# Patient Record
Sex: FEMALE | Race: WHITE | NOT HISPANIC OR LATINO | Employment: FULL TIME | URBAN - METROPOLITAN AREA
[De-identification: names, ages, dates, MRNs, and addresses within clinical notes are randomized per-mention and may not be internally consistent; named-entity substitution may affect disease eponyms.]

---

## 2017-01-24 ENCOUNTER — ALLSCRIPTS OFFICE VISIT (OUTPATIENT)
Dept: OTHER | Facility: OTHER | Age: 42
End: 2017-01-24

## 2017-07-27 ENCOUNTER — ALLSCRIPTS OFFICE VISIT (OUTPATIENT)
Dept: OTHER | Facility: OTHER | Age: 42
End: 2017-07-27

## 2017-07-27 DIAGNOSIS — E55.9 VITAMIN D DEFICIENCY: ICD-10-CM

## 2017-07-27 DIAGNOSIS — F33.9 RECURRENT MAJOR DEPRESSIVE DISORDER (HCC): ICD-10-CM

## 2017-07-27 DIAGNOSIS — R53.83 OTHER FATIGUE: ICD-10-CM

## 2017-07-27 DIAGNOSIS — G43.909 MIGRAINE WITHOUT STATUS MIGRAINOSUS, NOT INTRACTABLE: ICD-10-CM

## 2017-07-27 DIAGNOSIS — K21.9 GASTRO-ESOPHAGEAL REFLUX DISEASE WITHOUT ESOPHAGITIS: ICD-10-CM

## 2017-08-21 ENCOUNTER — GENERIC CONVERSION - ENCOUNTER (OUTPATIENT)
Dept: OTHER | Facility: OTHER | Age: 42
End: 2017-08-21

## 2017-11-14 ENCOUNTER — ALLSCRIPTS OFFICE VISIT (OUTPATIENT)
Dept: OTHER | Facility: OTHER | Age: 42
End: 2017-11-14

## 2017-11-15 NOTE — PROGRESS NOTES
Assessment    1  Infection, upper respiratory (465 9) (J06 9)    Plan  Infection, upper respiratory    · Azithromycin 250 MG Oral Tablet; TAKE 2 TABLETS BY MOUTH ON DAY 1, THENTAKE 1 TABLET BY MOUTH DAILY FOR 4 DAYS   · Fluticasone Propionate 50 MCG/ACT Nasal Suspension; instill 2 sprays into eachnostril once daily    Discussion/Summary    Patient presents for evaluation of upper respiratory infection  Will treat with Zithromax and Flonase  Advised rest, fluids  She has low-grade fever in the office today, she may use Tylenol or Advil as needed  She will contact me in a few days if her symptoms are not improving significantly  Otherwise she will continue same daily medications  The patient was counseled regarding instructions for management,-- impressions  Possible side effects of new medications were reviewed with the patient/guardian today  The treatment plan was reviewed with the patient/guardian  The patient/guardian understands and agrees with the treatment plan      Chief Complaint  Patient is here c/o nasal congestion and slight dry cough x's 1+ days  All medications were reviewed and updated with the patient  History of Present Illness  HPI: cold s/o  stuffy , ST, not coughing yet  no fever, feels very achyson has similar symptoms that have started few days early, he is now coughing experiencing intermittent symptoms of wheezing  Patient is concerned that her symptoms will progress  Otherwise she has been feeling well  Symptoms of anxiety well controlled on Effexor 75 mg daily, patient is compliant with medication and feels goodis up-to-date with flu vaccine      Review of Systems   Constitutional: feeling tired, but-- no fever-- and-- no chills  ENT: sore throat,-- nasal discharge-- and-- hoarseness  Cardiovascular: no complaints of slow or fast heart rate, no chest pain, no palpitations, no leg claudication or lower extremity edema    Respiratory: no complaints of shortness of breath, no wheezing, no dyspnea on exertion, no orthopnea or PND  Neurological: headache  Active Problems  1  Acid reflux (530 81) (K21 9)   2  Acute Bronchitis With Bronchospasm (466 0)   3  Allergic rhinitis (477 9) (J30 9)   4  Arthralgia of knee, left (719 46) (M25 562)   5  Depression with anxiety (300 4) (F41 8)   6  Esophageal reflux (530 81) (K21 9)   7  Fatigue (780 79) (R53 83)   8  Headache, migraine (346 90) (G43 909)   9  Infection, upper respiratory (465 9) (J06 9)   10  Influenza (487 1) (J11 1)   11  Insomnia (780 52) (G47 00)   12  Need for prophylactic vaccination and inoculation against influenza (V04 81) (Z23)   13  Obesity (BMI 30-39 9) (278 00) (E66 9)   14  Pharyngitis (462) (J02 9)   15  Vitamin D deficiency (268 9) (E55 9)    Past Medical History  Active Problems And Past Medical History Reviewed: The active problems and past medical history were reviewed and updated today  Family History  Mother    1  Family history of Diabetes Mellitus Under Control   2  Family history of cerebral aneurysm (V17 1) (Z82 49)   3  Family history of Hypertension (V17 49)  Father    4  Family history of Acute Myocardial Infarction - Initial Care ([place])  Brother    11  Family history of Hypertension (V17 49)  Maternal Aunt    6  Family history of Breast Cancer (V16 3)  Family History Reviewed: The family history was reviewed and updated today  Social History     · Marital History - Currently    · Never A Smoker   · Working Full Time  The social history was reviewed and updated today  Current Meds   1  ClonazePAM 0 5 MG Oral Tablet; Take1 tablet in am and  1-2 tablets qhs  as needed for anxiety / insomnia; Therapy: 04FEG2557 to (Evaluate:30Jan2018); Last Rx:01Nov2017 Ordered   2  Pantoprazole Sodium 40 MG Oral Tablet Delayed Release; Take 1 tablet daily;  Therapy: 46JVK3799 to (Last Rx:26Jun2017)  Requested for: 26Jun2017 Ordered   3  Relpax 40 MG Oral Tablet; TAKE 1 TABLET BY MOUTH DAILY AS NEEDED FOR HEADACHE, ok to repeat in 2 hours , max  dose 2 tabs /24 hours; Therapy: 23DOB0741 to (Evaluate:89Cud3048)  Requested for: 39ZOI8685; Last Rx:90Qkg5227 Ordered   4  TraMADol HCl - 50 MG Oral Tablet; TAKE 1 TABLET TWICE a day PRN : headache; Therapy: 85IBC2780 to ([facility])  Requested for: 23Oct2017; Last Rx:23Oct2017 Ordered   5  ValACYclovir HCl - 500 MG Oral Tablet; Therapy: 58FZS7845 to Recorded   6  Venlafaxine HCl ER 75 MG Oral Capsule Extended Release 24 Hour; take 1 capsule by mouth once daily; Therapy: 24QOP2628 to (Last Rx:87Agp5272)  Requested for: 12Pus7862 Ordered    The medication list was reviewed and updated today  Allergies  1  Biaxin XL TB24   2  Bactrim TABS    Vitals   Recorded: 89NIM6446 05:07PM   Temperature 99 1 F   Heart Rate 78   Respiration 16   Systolic 226   Diastolic 88   Height 5 ft 4 in   Weight 209 lb    BMI Calculated 35 87   BSA Calculated 1 99       Physical Exam   Constitutional  General appearance: No acute distress, well appearing and well nourished  well developed-- and-- appears tired  Eyes  Conjunctiva and lids: No swelling, erythema or discharge  Ears, Nose, Mouth, and Throat  Nasal mucosa, septum, and turbinates: Abnormal   The bilateral nasal mucosa was boggy,-- edematous-- and-- red  Oropharynx: Abnormal  -- Erythema, postnasal drip, no exudates  Pulmonary  Respiratory effort: No increased work of breathing or signs of respiratory distress  Auscultation of lungs: Clear to auscultation  Cardiovascular  Auscultation of heart: Normal rate and rhythm, normal S1 and S2, without murmurs  Lymphatic  Palpation of lymph nodes in neck: No lymphadenopathy  Musculoskeletal  Gait and station: Normal    Neurologic  Cranial nerves: Cranial nerves 2-12 intact  Psychiatric  Orientation to person, place, and time: Normal    Mood and affect: Normal          Signatures   Electronically signed by :  MALCOLM Ashton ; Nov 14 2017  7:15PM EST (Author)

## 2018-01-10 NOTE — MISCELLANEOUS
Message  Return to work or school:   Haley Hammonds is under my professional care  She was seen in my office on 11/14/2017       Please excuse patient from work on 11/13/2017 through 11/15/2017 returning to work on 11/16/2017 due to illness  Signatures   Electronically signed by :  MALCOLM Sierra ; Nov 16 2017  8:18AM EST                       (Author)

## 2018-01-13 VITALS
DIASTOLIC BLOOD PRESSURE: 88 MMHG | WEIGHT: 209 LBS | TEMPERATURE: 99.1 F | BODY MASS INDEX: 35.68 KG/M2 | HEART RATE: 78 BPM | SYSTOLIC BLOOD PRESSURE: 124 MMHG | HEIGHT: 64 IN | RESPIRATION RATE: 16 BRPM

## 2018-01-14 VITALS
HEART RATE: 76 BPM | RESPIRATION RATE: 16 BRPM | TEMPERATURE: 101.4 F | DIASTOLIC BLOOD PRESSURE: 84 MMHG | WEIGHT: 220.13 LBS | HEIGHT: 64 IN | SYSTOLIC BLOOD PRESSURE: 120 MMHG | BODY MASS INDEX: 37.58 KG/M2

## 2018-01-18 NOTE — PROGRESS NOTES
Assessment    1  Encounter for preventive health examination (V70 0) (Z00 00)   2  Headache, migraine (346 90) (G43 909)   3  Vitamin D deficiency (268 9) (E55 9)   4  Depression with anxiety (300 4) (F41 8)   5  Obesity (BMI 30-39 9) (278 00) (E66 9)    Plan  Depression, recurrent, Esophageal reflux, Fatigue, Headache, migraine, Vitamin D  deficiency    · (1) CBC/PLT/DIFF; Status:Active; Requested for:62Kaj2753;    · (1) COMPREHENSIVE METABOLIC PANEL; Status:Active; Requested for:61Efg7494;    · (1) LIPID PANEL FASTING W DIRECT LDL REFLEX; Status:Active; Requested  for:08Iha0102;    · (1) TSH; Status:Active; Requested for:47Ilf2860;    · (1) VITAMIN D 25-HYDROXY; Status:Active; Requested for:75Smf1221;   Generalized anxiety disorder    · Venlafaxine HCl ER 75 MG Oral Capsule Extended Release 24 Hour; take 1  capsule by mouth once daily  Obesity (BMI 30-39  9)    · We recommend that you bring your body mass index down to 26 ; Status:Complete;    Done: 70GRF2902    Discussion/Summary  health maintenance visit Currently, she eats a healthy diet  cervical cancer screening is current cervical cancer screening is needed every year Breast cancer screening: mammogram is current and mammogram is needed every year  Screening lab work includes hemoglobin, glucose, lipid profile, thyroid function testing and 25-hydroxyvitamin D  Advice and education were given regarding nutrition, aerobic exercise, weight bearing exercise and weight loss  Annual exam   Depression  Generalized sided disorder  Symptoms have improved significantly with lifestyle changes  Patient has been feeling well and would like to decrease dose of Effexor to 75 mg daily  She will contact me if symptoms worsen on the lower dose of medication  She uses Klonopin as needed  Acid reflux disease-she uses Protonix as needed only  Migraine headaches-improved significantly, patient uses Duexis, Zofran,Relpax and tramadol on as-needed basis only    Health maintenance- patient has pending appointments for mammogram and gynecological exam   Obesity-we discussed weight loss, lifestyle changes, exercise  We will proceed with blood work as outlined above  Possible side effects of new medications were reviewed with the patient/guardian today  The treatment plan was reviewed with the patient/guardian  The patient/guardian understands and agrees with the treatment plan      Chief Complaint  Pt is here for an annual physical  Pt voices no complaints  All meds/allergies reviewed with pt  History of Present Illness  HM, Adult Female: The patient is being seen for a health maintenance evaluation  General Health: The patient's health since the last visit is described as good  Lifestyle:  She has weight concerns  She does not exercise regularly  She does not use tobacco    Reproductive health: the patient is premenopausal    Screening: cancer screening reviewed and updated  metabolic screening reviewed and updated  risk screening reviewed and updated  HPI: Annual exam    Patient feels well and offers no significant complaints  Symptoms of anxiety, depression and chronic headaches have improved significantly as patient has switch her jobs  She reports significant reduction of daily stress   She uses clonazepam on as-needed basis only  Patient is compliant with daily Effexor  mg daily and is wondering dose of medication could be decreased  GYN - 9/1/17 - [facility]  pending mammo in 8/2017   migraines have improved  regular menses      Review of Systems    Constitutional: No fever, no chills, feels well, no tiredness, no recent weight gain or weight loss  Eyes: No complaints of eye pain, no red eyes, no eyesight problems, no discharge, no dry eyes, no itching of eyes  ENT: no complaints of earache, no loss of hearing, no nose bleeds, no nasal discharge, no sore throat, no hoarseness     Cardiovascular: No complaints of slow heart rate, no fast heart rate, no chest pain, no palpitations, no leg claudication, no lower extremity edema  Respiratory: No complaints of shortness of breath, no wheezing, no cough, no SOB on exertion, no orthopnea, no PND  Gastrointestinal: No complaints of abdominal pain, no constipation, no nausea or vomiting, no diarrhea, no bloody stools  Genitourinary: No complaints of dysuria, no incontinence, no pelvic pain, no dysmenorrhea, no vaginal discharge or bleeding  Musculoskeletal: No complaints of arthralgias, no myalgias, no joint swelling or stiffness, no limb pain or swelling  Integumentary: No complaints of skin rash or lesions, no itching, no skin wounds, no breast pain or lump  Neurological: No complaints of headache, no confusion, no convulsions, no numbness, no dizziness or fainting, no tingling, no limb weakness, no difficulty walking  Psychiatric: Not suicidal, no sleep disturbance, no anxiety or depression, no change in personality, no emotional problems  Endocrine: No complaints of proptosis, no hot flashes, no muscle weakness, no deepening of the voice, no feelings of weakness  Hematologic/Lymphatic: No complaints of swollen glands, no swollen glands in the neck, does not bleed easily, does not bruise easily  Active Problems    1  Acid reflux (530 81) (K21 9)   2  Acute Bronchitis With Bronchospasm (466 0)   3  Allergic rhinitis (477 9) (J30 9)   4  Arthralgia of knee, left (719 46) (M25 562)   5  Esophageal reflux (530 81) (K21 9)   6  Fatigue (780 79) (R53 83)   7  Headache, migraine (346 90) (G43 909)   8  Influenza (487 1) (J11 1)   9  Insomnia (780 52) (G47 00)   10  Need for prophylactic vaccination and inoculation against influenza (V04 81) (Z23)   11  Pharyngitis (462) (J02 9)   12  Upper respiratory infection (465 9) (J06 9)   13   Vitamin D deficiency (268 9) (E55 9)    Family History  Mother    · Family history of Diabetes Mellitus Under Control   · Family history of cerebral aneurysm (V17 1) (Z82 49)   · Family history of Hypertension (V17 49)  Father    · Family history of Acute Myocardial Infarction - Initial Care ([place])  Brother    · Family history of Hypertension (V17 49)  Maternal Aunt    · Family history of Breast Cancer (V16 3)    Social History    · Marital History - Currently    · Never A Smoker   · Working Full Time    Current Meds   1  ClonazePAM 0 5 MG Oral Tablet; Take1 tablet in am and  1-2 tablets qhs  as needed for   anxiety / insomnia; Therapy: 88PKT8027 to (Evaluate:94Mgl1428); Last Rx:31May2017 Ordered   2  Duexis 800-26 6 MG Oral Tablet; TAKE 1 TABLET  every  8 hours PRN : pain Wannetta Corey; Therapy: 33TRG3696 to (Evaluate:05Apr2017); Last Rx:78Zfq3981 Ordered   3  Ondansetron 4 MG Oral Tablet Disintegrating; TAKE 1 TABLET 3 times daily PRN   nausea/vomiting; Therapy: 95XXJ4526 to (Last WX:29EON3726)  Requested for: 10SAP9620 Ordered   4  Pantoprazole Sodium 40 MG Oral Tablet Delayed Release; Take 1 tablet daily; Therapy: 54QGS9771 to (Last WQ:72AHP5291)  Requested for: 26Jun2017 Ordered   5  Relpax 40 MG Oral Tablet; TAKE 1 TABLET BY MOUTH DAILY AS NEEDED FOR   HEADACHE, ok to repeat in 2 hours , max  dose 2 tabs /24 hours; Therapy: 46OHR4935 to (Evaluate:56Xew8913)  Requested for: 64RAW2120; Last   Rx:37Srl9223 Ordered   6  TraMADol HCl - 50 MG Oral Tablet; TAKE 1 TABLET TWICE a day PRN : headache; Therapy: 90ANN6149 to (Evaluate:08Jun2017)  Requested for: 08IGA4221; Last   Rx:91Azi6721 Ordered   7  ValACYclovir HCl - 500 MG Oral Tablet; Therapy: 56JXQ9341 to Recorded   8  Venlafaxine HCl  MG Oral Capsule Extended Release 24 Hour; take 1 capsule   daily; Therapy: 68WMA3966 to (Last Rx:31May2017)  Requested for: 82MKC4686 Ordered    Allergies    1  Biaxin XL TB24   2   Bactrim TABS    Vitals   Recorded: 74REQ9762 01:06PM   Temperature 98 F   Heart Rate 80   Systolic 322   Diastolic 86   Height 5 ft 4 in   Weight 211 lb 8 oz   BMI Calculated 36 3 BSA Calculated 2     Physical Exam    Constitutional   General appearance: No acute distress, well appearing and well nourished  well developed and obese  Eyes   Conjunctiva and lids: No swelling, erythema or discharge  Pupils and irises: Equal, round, reactive to light  Neck   Neck: Supple, symmetric, trachea midline, no masses  Thyroid: Normal, no thyromegaly  Pulmonary   Respiratory effort: No increased work of breathing or signs of respiratory distress  Auscultation of lungs: Clear to auscultation  Cardiovascular   Auscultation of heart: Normal rate and rhythm, normal S1 and S2, no murmurs  Carotid pulses: 2+ bilaterally  Abdominal aorta: Normal     Examination of extremities for edema and/or varicosities: Normal     Chest   Chest: Normal     Abdomen   Abdomen: Non-tender, no masses  Liver and spleen: No hepatomegaly or splenomegaly  Musculoskeletal   Gait and station: Normal     Psychiatric   Judgment and insight: Normal     Orientation to person, place, and time: Normal     Recent and remote memory: Intact  Mood and affect: Normal        Results/Data  PHQ-2 Adult Depression Screening 99Zdj9782 01:17PM User, s     Test Name Result Flag Reference   PHQ-2 Adult Depression Score 0     Over the last two weeks, how often have you been bothered by any of the following problems? Little interest or pleasure in doing things: Not at all - 0  Feeling down, depressed, or hopeless: Not at all - 0   PHQ-2 Adult Depression Screening Negative         Signatures   Electronically signed by :  MALCOLM Cornejo ; Jul 29 2017  8:25PM EST                       (Author)

## 2018-01-22 VITALS
SYSTOLIC BLOOD PRESSURE: 124 MMHG | HEIGHT: 64 IN | TEMPERATURE: 98 F | WEIGHT: 211.5 LBS | HEART RATE: 80 BPM | DIASTOLIC BLOOD PRESSURE: 86 MMHG | BODY MASS INDEX: 36.11 KG/M2

## 2018-03-07 DIAGNOSIS — G43.911 INTRACTABLE MIGRAINE WITH STATUS MIGRAINOSUS, UNSPECIFIED MIGRAINE TYPE: Primary | ICD-10-CM

## 2018-03-07 RX ORDER — ELETRIPTAN HYDROBROMIDE 40 MG/1
40 TABLET, FILM COATED ORAL ONCE AS NEEDED
Qty: 9 TABLET | Refills: 0 | Status: SHIPPED | OUTPATIENT
Start: 2018-03-07 | End: 2019-10-14 | Stop reason: ALTCHOICE

## 2018-04-23 ENCOUNTER — TELEPHONE (OUTPATIENT)
Dept: FAMILY MEDICINE CLINIC | Facility: CLINIC | Age: 43
End: 2018-04-23

## 2018-04-23 NOTE — TELEPHONE ENCOUNTER
I cannot prescribe Xanax close patient is already using Klonopin, those 2 medications cannot be combined together    I will be happy to see her in the office if her anxiety is getting worse

## 2018-04-23 NOTE — TELEPHONE ENCOUNTER
Patient called asking for a RX for Zanax due to loosing her dog this weekend  Patient uses [facility] [address]  Patient can be reached at [phone]

## 2018-05-04 ENCOUNTER — TELEPHONE (OUTPATIENT)
Dept: FAMILY MEDICINE CLINIC | Facility: CLINIC | Age: 43
End: 2018-05-04

## 2018-05-04 DIAGNOSIS — R51.9 NONINTRACTABLE HEADACHE, UNSPECIFIED CHRONICITY PATTERN, UNSPECIFIED HEADACHE TYPE: Primary | ICD-10-CM

## 2018-05-04 RX ORDER — TRAMADOL HYDROCHLORIDE 50 MG/1
50 TABLET ORAL 2 TIMES DAILY PRN
Qty: 30 TABLET | Refills: 0 | Status: SHIPPED | OUTPATIENT
Start: 2018-05-04 | End: 2018-06-18 | Stop reason: SDUPTHER

## 2018-05-04 RX ORDER — TRAMADOL HYDROCHLORIDE 50 MG/1
TABLET ORAL
COMMUNITY
Start: 2015-07-14 | End: 2018-05-04 | Stop reason: SDUPTHER

## 2018-05-04 NOTE — TELEPHONE ENCOUNTER
Pt is requesting an rx for Tramadol 50 mg Take 1 tab BID PRN be sent to [facility]   Please advise

## 2018-06-18 DIAGNOSIS — R51.9 NONINTRACTABLE HEADACHE, UNSPECIFIED CHRONICITY PATTERN, UNSPECIFIED HEADACHE TYPE: ICD-10-CM

## 2018-06-18 RX ORDER — TRAMADOL HYDROCHLORIDE 50 MG/1
50 TABLET ORAL 2 TIMES DAILY PRN
Qty: 30 TABLET | Refills: 0 | OUTPATIENT
Start: 2018-06-18 | End: 2018-08-15 | Stop reason: SDUPTHER

## 2018-06-19 DIAGNOSIS — R51.9 NONINTRACTABLE HEADACHE, UNSPECIFIED CHRONICITY PATTERN, UNSPECIFIED HEADACHE TYPE: ICD-10-CM

## 2018-06-19 RX ORDER — TRAMADOL HYDROCHLORIDE 50 MG/1
50 TABLET ORAL 2 TIMES DAILY PRN
Qty: 30 TABLET | Refills: 0 | OUTPATIENT
Start: 2018-06-19

## 2018-06-19 NOTE — TELEPHONE ENCOUNTER
Can you check with the pharmacy to see if this prescription was sent over yesterday?  If not can you please call it in?

## 2018-07-25 DIAGNOSIS — K21.9 CHRONIC GERD: Primary | ICD-10-CM

## 2018-07-26 RX ORDER — PANTOPRAZOLE SODIUM 40 MG/1
TABLET, DELAYED RELEASE ORAL
Qty: 90 TABLET | Refills: 1 | Status: SHIPPED | OUTPATIENT
Start: 2018-07-26 | End: 2019-03-23 | Stop reason: SDUPTHER

## 2018-08-15 DIAGNOSIS — R51.9 NONINTRACTABLE HEADACHE, UNSPECIFIED CHRONICITY PATTERN, UNSPECIFIED HEADACHE TYPE: ICD-10-CM

## 2018-08-15 NOTE — TELEPHONE ENCOUNTER
Re: Tramadol refill     Patient calling to see if pharmacy sent over request for a refill for this medication  She is currently experiencing a migraine & just wants to see if the medication can be filled today  Please call to advise

## 2018-08-16 RX ORDER — TRAMADOL HYDROCHLORIDE 50 MG/1
TABLET ORAL
Qty: 30 TABLET | Refills: 0 | Status: SHIPPED | OUTPATIENT
Start: 2018-08-16 | End: 2018-09-28 | Stop reason: SDUPTHER

## 2018-09-28 ENCOUNTER — TELEPHONE (OUTPATIENT)
Dept: FAMILY MEDICINE CLINIC | Facility: CLINIC | Age: 43
End: 2018-09-28

## 2018-09-28 DIAGNOSIS — R51.9 NONINTRACTABLE HEADACHE, UNSPECIFIED CHRONICITY PATTERN, UNSPECIFIED HEADACHE TYPE: ICD-10-CM

## 2018-09-28 RX ORDER — TRAMADOL HYDROCHLORIDE 50 MG/1
TABLET ORAL
Qty: 30 TABLET | Refills: 0 | Status: SHIPPED | OUTPATIENT
Start: 2018-09-28 | End: 2018-11-13 | Stop reason: SDUPTHER

## 2018-09-28 NOTE — TELEPHONE ENCOUNTER
Patient is home today with a migraine & wants to know if Dr Marina Reilly can write a work note for her  Please call to advise

## 2018-09-28 NOTE — LETTER
January 4, 2019     Patient: Jessenia Bartlett   YOB: 1975         To Whom It May Concern:    Please excuse the above-referenced patient from work today due to illness  Patient may return to work on 9/29/18       If you have any questions or concerns, please don't hesitate to call           Sincerely,        Yecenia Gordillo MD    CC: No Recipients

## 2018-11-13 DIAGNOSIS — R51.9 NONINTRACTABLE HEADACHE, UNSPECIFIED CHRONICITY PATTERN, UNSPECIFIED HEADACHE TYPE: ICD-10-CM

## 2018-11-14 RX ORDER — TRAMADOL HYDROCHLORIDE 50 MG/1
TABLET ORAL
Qty: 30 TABLET | Refills: 0 | Status: SHIPPED | OUTPATIENT
Start: 2018-11-14 | End: 2019-01-17 | Stop reason: SDUPTHER

## 2018-12-18 DIAGNOSIS — F41.8 DEPRESSION WITH ANXIETY: Primary | ICD-10-CM

## 2018-12-18 RX ORDER — VENLAFAXINE HYDROCHLORIDE 75 MG/1
CAPSULE, EXTENDED RELEASE ORAL
Qty: 90 CAPSULE | Refills: 3 | Status: SHIPPED | OUTPATIENT
Start: 2018-12-18 | End: 2019-01-17 | Stop reason: SDUPTHER

## 2018-12-27 ENCOUNTER — OFFICE VISIT (OUTPATIENT)
Dept: FAMILY MEDICINE CLINIC | Facility: CLINIC | Age: 43
End: 2018-12-27
Payer: COMMERCIAL

## 2018-12-27 VITALS
DIASTOLIC BLOOD PRESSURE: 82 MMHG | HEART RATE: 96 BPM | SYSTOLIC BLOOD PRESSURE: 118 MMHG | BODY MASS INDEX: 38.07 KG/M2 | HEIGHT: 64 IN | RESPIRATION RATE: 16 BRPM | TEMPERATURE: 98.3 F | WEIGHT: 223 LBS

## 2018-12-27 DIAGNOSIS — J01.90 ACUTE NON-RECURRENT SINUSITIS, UNSPECIFIED LOCATION: Primary | ICD-10-CM

## 2018-12-27 DIAGNOSIS — Z71.6 ENCOUNTER FOR SMOKING CESSATION COUNSELING: ICD-10-CM

## 2018-12-27 PROBLEM — E66.9 OBESITY (BMI 30-39.9): Status: ACTIVE | Noted: 2017-07-29

## 2018-12-27 PROBLEM — F41.8 DEPRESSION WITH ANXIETY: Status: ACTIVE | Noted: 2017-07-29

## 2018-12-27 PROCEDURE — 99213 OFFICE O/P EST LOW 20 MIN: CPT | Performed by: NURSE PRACTITIONER

## 2018-12-27 RX ORDER — FLUTICASONE PROPIONATE 50 MCG
2 SPRAY, SUSPENSION (ML) NASAL DAILY
Qty: 1 BOTTLE | Refills: 0 | Status: SHIPPED | OUTPATIENT
Start: 2018-12-27 | End: 2019-10-14 | Stop reason: ALTCHOICE

## 2018-12-27 RX ORDER — VALACYCLOVIR HYDROCHLORIDE 500 MG/1
500 TABLET, FILM COATED ORAL
COMMUNITY
Start: 2015-11-13

## 2018-12-27 RX ORDER — CLONAZEPAM 0.5 MG/1
TABLET ORAL
COMMUNITY
Start: 2016-12-06 | End: 2018-12-27

## 2018-12-27 RX ORDER — FLUTICASONE PROPIONATE 50 MCG
2 SPRAY, SUSPENSION (ML) NASAL DAILY
COMMUNITY
Start: 2017-11-14 | End: 2018-12-27 | Stop reason: SDUPTHER

## 2018-12-27 RX ORDER — AMOXICILLIN AND CLAVULANATE POTASSIUM 875; 125 MG/1; MG/1
1 TABLET, FILM COATED ORAL EVERY 12 HOURS SCHEDULED
Qty: 20 TABLET | Refills: 0 | Status: SHIPPED | OUTPATIENT
Start: 2018-12-27 | End: 2019-01-06

## 2018-12-27 RX ORDER — GUAIFENESIN AND CODEINE PHOSPHATE 100; 10 MG/5ML; MG/5ML
5 SOLUTION ORAL
Qty: 120 ML | Refills: 0 | Status: SHIPPED | OUTPATIENT
Start: 2018-12-27 | End: 2019-01-17

## 2018-12-27 NOTE — PATIENT INSTRUCTIONS
Rhinosinusitis   WHAT YOU NEED TO KNOW:   Rhinosinusitis (RS) is inflammation of your nose and sinuses  It commonly begins as a virus, often as a common cold  Viruses usually last 7 to 10 days and do not need treatment  When the virus does not get better on its own, you may have bacterial RS  This means that bacteria have begun to grow inside your sinuses  Acute RS lasts less than 4 weeks  Chronic RS lasts 12 weeks or more  Recurrent RS is when you have 4 or more episodes of RS in one year  DISCHARGE INSTRUCTIONS:   Return to the emergency department if:   · Your eye and eyelid are red, swollen, and painful  · You cannot open your eye  · You have double vision or you cannot see  · Your eyeball bulges out or you cannot move your eye  · You are more sleepy than normal or you notice changes in your ability to think, move, or talk  · You have a stiff neck, a fever, or a bad headache  · You have swelling of your forehead or scalp  Contact your healthcare provider if:   · Your symptoms are worse or do not improve after 3 to 5 days of treatment  · You have questions or concerns about your condition or care  Medicines: You may need any of the following:  · Acetaminophen  decreases pain and fever  It is available without a doctor's order  Ask how much to take and how often to take it  Follow directions  Acetaminophen can cause liver damage if not taken correctly  · NSAIDs , such as ibuprofen, help decrease swelling, pain, and fever  This medicine is available with or without a doctor's order  NSAIDs can cause stomach bleeding or kidney problems in certain people  If you take blood thinner medicine, always ask your healthcare provider if NSAIDs are safe for you  Always read the medicine label and follow directions  · Nasal steroid sprays  decrease inflammation in your nose and sinuses  · Decongestants  reduce swelling and drain mucus in the nose and sinuses   They may help you breathe easier  · Antihistamines  dry mucus in the nose and relieve sneezing  · Antibiotics  treat a bacterial infection and may be needed if your symptoms do not improve or they get worse  · Take your medicine as directed  Contact your healthcare provider if you think your medicine is not helping or if you have side effects  Tell him or her if you are allergic to any medicine  Keep a list of the medicines, vitamins, and herbs you take  Include the amounts, and when and why you take them  Bring the list or the pill bottles to follow-up visits  Carry your medicine list with you in case of an emergency  Self-care:   · Rinse your sinuses  Use a sinus rinse device to rinse your nasal passages with a saline (salt water) solution  This will help thin the mucus in your nose and rinse away pollen and dirt  It will also help reduce swelling so you can breathe normally  Ask your healthcare provider how often to do this  · Breathe in steam   Heat a bowl of water until you see steam  Lean over the bowl and make a tent over your head with a large towel  Breathe deeply for about 20 minutes  Be careful not to get too close to the steam or burn yourself  Do this 3 times a day  You can also breathe deeply when you take a hot shower  · Sleep with your head elevated  Place an extra pillow under your head before you go to sleep to help your sinuses drain  · Drink liquids as directed  Ask your healthcare provider how much liquid to drink each day and which liquids are best for you  Liquids will thin the mucus in your nose and help it drain  Avoid drinks that contain alcohol or caffeine  · Do not smoke, and avoid secondhand smoke  Nicotine and other chemicals in cigarettes and cigars can make your symptoms worse  Ask your healthcare provider for information if you currently smoke and need help to quit  E-cigarettes or smokeless tobacco still contain nicotine   Talk to your healthcare provider before you use these products  Follow up with your healthcare provider as directed: Follow up if your symptoms are worse or not better after 3 to 5 days of treatment  Write down your questions so you remember to ask them during your visits  © 2017 [facility] [place] Information is for End User's use only and may not be sold, redistributed or otherwise used for commercial purposes  All illustrations and images included in CareNotes® are the copyrighted property of [employer]  or [employer]  The above information is an  only  It is not intended as medical advice for individual conditions or treatments  Talk to your doctor, nurse or pharmacist before following any medical regimen to see if it is safe and effective for you

## 2018-12-27 NOTE — PROGRESS NOTES
FAMILY PRACTICE OFFICE VISIT       NAME: Matthew Petit  AGE: 44 y o  SEX: female       : 1975        MRN: [id number]    DATE: 2018    Assessment and Plan     Problem List Items Addressed This Visit     None      Visit Diagnoses     Acute non-recurrent sinusitis, unspecified location    -  Primary    Relevant Medications    amoxicillin-clavulanate (AUGMENTIN) 875-125 mg per tablet    guaifenesin-codeine (GUAIFENESIN AC) 100-10 MG/5ML liquid    fluticasone (FLONASE) 50 mcg/act nasal spray    Encounter for smoking cessation counseling                Patient Instructions   Rhinosinusitis   WHAT YOU NEED TO KNOW:   Rhinosinusitis (RS) is inflammation of your nose and sinuses  It commonly begins as a virus, often as a common cold  Viruses usually last 7 to 10 days and do not need treatment  When the virus does not get better on its own, you may have bacterial RS  This means that bacteria have begun to grow inside your sinuses  Acute RS lasts less than 4 weeks  Chronic RS lasts 12 weeks or more  Recurrent RS is when you have 4 or more episodes of RS in one year  DISCHARGE INSTRUCTIONS:   Return to the emergency department if:   · Your eye and eyelid are red, swollen, and painful  · You cannot open your eye  · You have double vision or you cannot see  · Your eyeball bulges out or you cannot move your eye  · You are more sleepy than normal or you notice changes in your ability to think, move, or talk  · You have a stiff neck, a fever, or a bad headache  · You have swelling of your forehead or scalp  Contact your healthcare provider if:   · Your symptoms are worse or do not improve after 3 to 5 days of treatment  · You have questions or concerns about your condition or care  Medicines: You may need any of the following:  · Acetaminophen  decreases pain and fever  It is available without a doctor's order  Ask how much to take and how often to take it  Follow directions  Acetaminophen can cause liver damage if not taken correctly  · NSAIDs , such as ibuprofen, help decrease swelling, pain, and fever  This medicine is available with or without a doctor's order  NSAIDs can cause stomach bleeding or kidney problems in certain people  If you take blood thinner medicine, always ask your healthcare provider if NSAIDs are safe for you  Always read the medicine label and follow directions  · Nasal steroid sprays  decrease inflammation in your nose and sinuses  · Decongestants  reduce swelling and drain mucus in the nose and sinuses  They may help you breathe easier  · Antihistamines  dry mucus in the nose and relieve sneezing  · Antibiotics  treat a bacterial infection and may be needed if your symptoms do not improve or they get worse  · Take your medicine as directed  Contact your healthcare provider if you think your medicine is not helping or if you have side effects  Tell him or her if you are allergic to any medicine  Keep a list of the medicines, vitamins, and herbs you take  Include the amounts, and when and why you take them  Bring the list or the pill bottles to follow-up visits  Carry your medicine list with you in case of an emergency  Self-care:   · Rinse your sinuses  Use a sinus rinse device to rinse your nasal passages with a saline (salt water) solution  This will help thin the mucus in your nose and rinse away pollen and dirt  It will also help reduce swelling so you can breathe normally  Ask your healthcare provider how often to do this  · Breathe in steam   Heat a bowl of water until you see steam  Lean over the bowl and make a tent over your head with a large towel  Breathe deeply for about 20 minutes  Be careful not to get too close to the steam or burn yourself  Do this 3 times a day  You can also breathe deeply when you take a hot shower  · Sleep with your head elevated    Place an extra pillow under your head before you go to sleep to help your sinuses drain  · Drink liquids as directed  Ask your healthcare provider how much liquid to drink each day and which liquids are best for you  Liquids will thin the mucus in your nose and help it drain  Avoid drinks that contain alcohol or caffeine  · Do not smoke, and avoid secondhand smoke  Nicotine and other chemicals in cigarettes and cigars can make your symptoms worse  Ask your healthcare provider for information if you currently smoke and need help to quit  E-cigarettes or smokeless tobacco still contain nicotine  Talk to your healthcare provider before you use these products  Follow up with your healthcare provider as directed: Follow up if your symptoms are worse or not better after 3 to 5 days of treatment  Write down your questions so you remember to ask them during your visits  © 2017 [facility] Information is for End User's use only and may not be sold, redistributed or otherwise used for commercial purposes  All illustrations and images included in CareNotes® are the copyrighted property of A D [employer] M [employer] Inc  or [employer]  The above information is an  only  It is not intended as medical advice for individual conditions or treatments  Talk to your doctor, nurse or pharmacist before following any medical regimen to see if it is safe and effective for you  1  Acute non-recurrent sinusitis, unspecified location  amoxicillin-clavulanate (AUGMENTIN) 875-125 mg per tablet    guaifenesin-codeine (GUAIFENESIN AC) 100-10 MG/5ML liquid    fluticasone (FLONASE) 50 mcg/act nasal spray   2  Encounter for smoking cessation counseling       This 56-year-old female presents today with symptoms consistent with sinusitis  Recommend treatment with Augmentin 875-125 mg twice daily for 10 days  Take Augmentin with food  Recommend taking over-the-counter probiotic or eating yogurt daily while taking antibiotics    Recommend resuming for Flonase nasal spray 2 sprays each nostril daily  Guaifenesin-codeine 100-10 mg/5 mL cough syrup as needed, especially at bedtime  She does have a prescription for tramadol which she uses for migraine headaches  She is aware not to use tramadol and cough syrup at the same time  Continue supportive care:  Rest, increase fluids, warm tea, honey and humidification  If symptoms worsen, or if symptoms are not improving over the next few days, instructed to call  Encouraged to follow through with her plan of quitting smoking after the new year with nicotine patches  She will contact us if she has any questions or needs further assistance in smoking cessation  She plans to schedule a routine follow-up appointment with Dr Ricarda Ruvalcaba in the next 1 month  Chief Complaint     Chief Complaint   Patient presents with    Cold Like Symptoms     Pt is here for cough, ear pressure, sinus and chest congestion 3 + wks       History of Present Illness     Sunni Truong is a 15-year-old female presenting today for cough, nasal congestion, ear pressure, sinus pressure and headaches for the past 3 weeks  Symptoms are worse at night  Denies fevers or chills  No sore throat, chest feels little bit tight, but denies shortness of breath or chest pain  She has been using over-the-counter DayQuil and Advil cold and Sinus as needed  She has not been using Flonase  She is currently smoking, started smoking within the past year and plans to quit in January of 2019  She has obtained nicotine patches, which she is planning to use to help her quit  Review of Systems   Review of Systems   Constitutional: Positive for fatigue  Negative for chills, diaphoresis and fever  HENT: Positive for congestion, postnasal drip, rhinorrhea and sinus pressure  Negative for sore throat  Ear pain: Ear pressure  Respiratory: Positive for cough and chest tightness  Negative for shortness of breath and wheezing      Cardiovascular: Negative for chest pain, palpitations and leg swelling  Neurological: Negative for dizziness and headaches  Active Problem List     Patient Active Problem List   Diagnosis    Alcohol use disorder    Depression with anxiety    Allergic rhinitis    Esophageal reflux    Headache, migraine    Obesity (BMI 30-39  9)    Vitamin D deficiency       Past Medical History:  No past medical history on file  Past Surgical History:  Past Surgical History:   Procedure Laterality Date    NO PAST SURGERIES         Family History:  Family History   Problem Relation Age of Onset    Diabetes Mother     Cerebral aneurysm Mother     Hypertension Mother     Heart attack Father     Hypertension Brother     Breast cancer Maternal Aunt        Social History:  Social History     Social History    Marital status: /Civil Union     Spouse name: N/A    Number of children: N/A    Years of education: N/A     Occupational History    Not on file  Social History Main Topics    Smoking status: Current Every Day Smoker    Smokeless tobacco: Never Used    Alcohol use Yes      Comment: social    Drug use: No    Sexual activity: Not on file     Other Topics Concern    Not on file     Social History Narrative    No narrative on file       I have reviewed the patient's medical history in detail; there are no changes to the history as noted in the electronic medical record  Objective     Vitals:    12/27/18 0856   BP: 118/82   Pulse: 96   Resp: 16   Temp: 98 3 °F (36 8 °C)   TempSrc: Tympanic   Weight: 101 kg (223 lb)   Height: 5' 4" (1 626 m)     Wt Readings from Last 3 Encounters:   12/27/18 101 kg (223 lb)   11/14/17 94 8 kg (209 lb)   07/27/17 95 9 kg (211 lb 8 oz)     Body mass index is 38 28 kg/m²    PHQ-9 Depression Screening    PHQ-9:    Frequency of the following problems over the past two weeks:       Little interest or pleasure in doing things:  0 - not at all  Feeling down, depressed, or hopeless:  0 - not at all  PHQ-2 Score:  0       Physical Exam   Constitutional: She appears well-developed and well-nourished  She does not appear ill  No distress  HENT:   Head: Normocephalic and atraumatic  Right Ear: Tympanic membrane and ear canal normal    Left Ear: Tympanic membrane and ear canal normal    Nose: Mucosal edema and rhinorrhea present  Mouth/Throat: Posterior oropharyngeal erythema present  No oropharyngeal exudate  Eyes: Conjunctivae are normal    Neck: Normal range of motion  Neck supple  Cardiovascular: Normal rate, regular rhythm and normal heart sounds  No murmur heard  Pulmonary/Chest: Effort normal and breath sounds normal    Lymphadenopathy:     She has cervical adenopathy (Bilateral submandibular adenopathy)  Psychiatric: She has a normal mood and affect  Nursing note and vitals reviewed        ALLERGIES:  Allergies   Allergen Reactions    Clarithromycin Other (See Comments)     nausea    Sulfa Antibiotics GI Intolerance    Sulfamethoxazole-Trimethoprim Other (See Comments)     rash       Current Medications     Current Outpatient Prescriptions   Medication Sig Dispense Refill    eletriptan (RELPAX) 40 MG tablet Take 1 tablet (40 mg total) by mouth once as needed for migraine for up to 1 dose may repeat in 2 hours if necessary 9 tablet 0    fluticasone (FLONASE) 50 mcg/act nasal spray 2 sprays into each nostril daily 1 Bottle 0    pantoprazole (PROTONIX) 40 mg tablet TAKE 1 TABLET DAILY 90 tablet 1    traMADol (ULTRAM) 50 mg tablet TAKE 1 TABLET BY MOUTH TWICE A DAY AS NEEDED FOR PAIN FOR HEADACHE 30 tablet 0    valACYclovir (VALTREX) 500 mg tablet Take 500 mg by mouth      venlafaxine (EFFEXOR-XR) 75 mg 24 hr capsule TAKE 1 CAPSULE DAILY 90 capsule 3    amoxicillin-clavulanate (AUGMENTIN) 875-125 mg per tablet Take 1 tablet by mouth every 12 (twelve) hours for 10 days 20 tablet 0    guaifenesin-codeine (GUAIFENESIN AC) 100-10 MG/5ML liquid Take 5 mL by mouth daily at bedtime as needed for cough 120 mL 0     No current facility-administered medications for this visit            Health Maintenance     Health Maintenance   Topic Date Due    MAMMOGRAM  1975    Pneumococcal PPSV23 Medium Risk Adult (1 of 1 - PPSV23) 04/22/1995    DTaP,Tdap,and Td Vaccines (1 - Tdap) 04/22/1997    PAP SMEAR  04/22/1997    Depression Screening PHQ  12/27/2019    INFLUENZA VACCINE  Completed     Immunization History   Administered Date(s) Administered    Influenza 10/16/2015, 10/31/2018    Influenza TIV (IM) 10/06/2016       HERNAN Pulido

## 2018-12-28 ENCOUNTER — TELEPHONE (OUTPATIENT)
Dept: FAMILY MEDICINE CLINIC | Facility: CLINIC | Age: 43
End: 2018-12-28

## 2018-12-28 NOTE — TELEPHONE ENCOUNTER
Patient called and stated that she still isnt feeling well and wanted to know if she can have a work note to go back to work on 12/31/2018    Please call to advise

## 2019-01-03 ENCOUNTER — TELEPHONE (OUTPATIENT)
Dept: FAMILY MEDICINE CLINIC | Facility: CLINIC | Age: 44
End: 2019-01-03

## 2019-01-03 DIAGNOSIS — J32.9 SINUSITIS, UNSPECIFIED CHRONICITY, UNSPECIFIED LOCATION: Primary | ICD-10-CM

## 2019-01-03 RX ORDER — PREDNISONE 10 MG/1
TABLET ORAL
Qty: 20 TABLET | Refills: 0 | Status: SHIPPED | OUTPATIENT
Start: 2019-01-03 | End: 2019-01-17

## 2019-01-03 NOTE — TELEPHONE ENCOUNTER
Patient called stating her Cough Medicine w/ Codeine did not help her cough at all, she still has the cough  Can you prescribe another medicine for her  Patient uses [facility] [address]  Patient can be reached at [phone]

## 2019-01-03 NOTE — TELEPHONE ENCOUNTER
I recommend trying a course of prednisone, this is an oral steroid, which should help with cough  I will send a prescription to her pharmacy  Please instruct her to take prednisone in the morning with food  Please have her call if her symptoms are persistent

## 2019-01-17 ENCOUNTER — OFFICE VISIT (OUTPATIENT)
Dept: FAMILY MEDICINE CLINIC | Facility: CLINIC | Age: 44
End: 2019-01-17
Payer: COMMERCIAL

## 2019-01-17 ENCOUNTER — TELEPHONE (OUTPATIENT)
Dept: FAMILY MEDICINE CLINIC | Facility: CLINIC | Age: 44
End: 2019-01-17

## 2019-01-17 VITALS
BODY MASS INDEX: 38.48 KG/M2 | WEIGHT: 225.4 LBS | TEMPERATURE: 98.3 F | DIASTOLIC BLOOD PRESSURE: 86 MMHG | RESPIRATION RATE: 16 BRPM | HEIGHT: 64 IN | HEART RATE: 88 BPM | SYSTOLIC BLOOD PRESSURE: 114 MMHG

## 2019-01-17 DIAGNOSIS — F41.8 DEPRESSION WITH ANXIETY: ICD-10-CM

## 2019-01-17 DIAGNOSIS — K21.9 GASTROESOPHAGEAL REFLUX DISEASE, ESOPHAGITIS PRESENCE NOT SPECIFIED: ICD-10-CM

## 2019-01-17 DIAGNOSIS — E66.9 OBESITY (BMI 30-39.9): ICD-10-CM

## 2019-01-17 DIAGNOSIS — Z72.0 TOBACCO USE: ICD-10-CM

## 2019-01-17 DIAGNOSIS — E55.9 VITAMIN D DEFICIENCY: ICD-10-CM

## 2019-01-17 DIAGNOSIS — Z00.00 WELL ADULT EXAM: Primary | ICD-10-CM

## 2019-01-17 DIAGNOSIS — J06.9 ACUTE URI: ICD-10-CM

## 2019-01-17 DIAGNOSIS — G43.709 CHRONIC MIGRAINE WITHOUT AURA WITHOUT STATUS MIGRAINOSUS, NOT INTRACTABLE: ICD-10-CM

## 2019-01-17 DIAGNOSIS — R51.9 NONINTRACTABLE HEADACHE, UNSPECIFIED CHRONICITY PATTERN, UNSPECIFIED HEADACHE TYPE: ICD-10-CM

## 2019-01-17 PROCEDURE — 99396 PREV VISIT EST AGE 40-64: CPT | Performed by: FAMILY MEDICINE

## 2019-01-17 RX ORDER — AZITHROMYCIN 250 MG/1
TABLET, FILM COATED ORAL
Qty: 6 TABLET | Refills: 0 | Status: SHIPPED | OUTPATIENT
Start: 2019-01-17 | End: 2019-01-21

## 2019-01-17 RX ORDER — AZITHROMYCIN 500 MG/1
TABLET, FILM COATED ORAL
Qty: 5 TABLET | Refills: 0 | Status: SHIPPED | OUTPATIENT
Start: 2019-01-17 | End: 2019-01-17 | Stop reason: ALTCHOICE

## 2019-01-17 RX ORDER — TRAMADOL HYDROCHLORIDE 50 MG/1
50 TABLET ORAL 3 TIMES DAILY PRN
Qty: 30 TABLET | Refills: 1 | Status: SHIPPED | OUTPATIENT
Start: 2019-01-17 | End: 2019-02-20 | Stop reason: SDUPTHER

## 2019-01-17 RX ORDER — GUAIFENESIN AND CODEINE PHOSPHATE 100; 10 MG/5ML; MG/5ML
SOLUTION ORAL
Qty: 240 ML | Refills: 0 | Status: SHIPPED | OUTPATIENT
Start: 2019-01-17 | End: 2019-10-14 | Stop reason: ALTCHOICE

## 2019-01-17 RX ORDER — VENLAFAXINE HYDROCHLORIDE 150 MG/1
CAPSULE, EXTENDED RELEASE ORAL
Qty: 90 CAPSULE | Refills: 1 | Status: SHIPPED | OUTPATIENT
Start: 2019-01-17 | End: 2019-07-01 | Stop reason: SDUPTHER

## 2019-01-17 RX ORDER — NICOTINE 21 MG/24HR
1 PATCH, TRANSDERMAL 24 HOURS TRANSDERMAL EVERY 24 HOURS
Qty: 28 PATCH | Refills: 0 | Status: SHIPPED | OUTPATIENT
Start: 2019-01-17 | End: 2019-10-14 | Stop reason: ALTCHOICE

## 2019-01-17 NOTE — PROGRESS NOTES
FAMILY PRACTICE OFFICE VISIT       NAME: Ingrid Manrique  AGE: 44 y o  SEX: female       : 1975        MRN: [id number]        Assessment and Plan     Problem List Items Addressed This Visit     Depression with anxiety    Relevant Medications    venlafaxine (EFFEXOR-XR) 150 mg 24 hr capsule    Other Relevant Orders    TSH, 3rd generation    Esophageal reflux     Protonix 40 mg daily as needed         Headache, migraine     Effexor ER, will increase dose to 150 mg daily  Abortive regimen includes Relpax, tramadol and ibuprofen           Relevant Medications    traMADol (ULTRAM) 50 mg tablet    venlafaxine (EFFEXOR-XR) 150 mg 24 hr capsule    Other Relevant Orders    CBC    Comprehensive metabolic panel    TSH, 3rd generation    Vitamin D 25 hydroxy    Obesity (BMI 30-39  9)    Relevant Orders    Lipid panel    TSH, 3rd generation    Vitamin D deficiency    Relevant Orders    Vitamin D 25 hydroxy      Other Visit Diagnoses     Well adult exam    -  Primary    Nonintractable headache, unspecified chronicity pattern, unspecified headache type        Relevant Medications    traMADol (ULTRAM) 50 mg tablet    Tobacco use        Relevant Medications    nicotine (NICODERM CQ) 14 mg/24hr TD 24 hr patch    Acute URI        Relevant Medications    guaifenesin-codeine (GUAIFENESIN AC) 100-10 MG/5ML liquid    azithromycin (ZITHROMAX) 250 mg tablet       Annual well exam   Follow-up of chronic medical conditions  Persistent symptoms of cold and cough  Patient will start Zithromax Z-Yang and will use Robitussin with codeine at night time  She is motivated to quit tobacco and will start nicotine replacement patches  Patient remains on medications for chronic medical conditions including migraine headaches, acid reflux disease and depression/anxiety  Will increase dose of Effexor from 75 to 150 mg daily  Patient will proceed with routine blood work  She is up-to-date with gyn exam and mammography    We discussed importance of lifestyle changes, weight loss, exercise, tobacco cessation  BMI Counseling: Body mass index is 38 69 kg/m²  Discussed the patient's BMI with her  The BMI is above average  BMI counseling and education was provided to the patient  Nutrition recommendations include reducing portion sizes, decreasing overall calorie intake, 3-5 servings of fruits/vegetables daily, reducing fast food intake, consuming healthier snacks, decreasing soda and/or juice intake, moderation in carbohydrate intake, increasing intake of lean protein, reducing intake of saturated fat and trans fat and reducing intake of cholesterol  Exercise recommendations include exercising 3-5 times per week  Tobacco Cessation Counseling: Tobacco cessation counseling and education was provided  The patient is sincerely urged to quit consumption of tobacco  She is ready to quit tobacco  The numerous health risks of tobacco consumption were discussed  Prescribed the following medications: nicotine patch  There are no Patient Instructions on file for this visit  M*[employer] software was used to dictate this note  It may contain errors with dictating incorrect words/spelling  Please contact provider directly with any questions  Chief Complaint     Chief Complaint   Patient presents with    Follow-up    Cough     off and on x's 3+ wks    Nasal Congestion       History of Present Illness     Patient presents for annual well exam/ follow-up of chronic medical conditions  She has been under lot of stress lately , family related stressors, 's recent acute illness  Patient overall has been coping well and is on Effexor ER 75 mg daily  Medication overall is working well, patient remains sober but would like to increase dose of medication for the time being  Patient unfortunately started smoking due to stress and is currently using up to half a pack daily  She would like to quit    We discussed options including Chantix and nicotine replacement therapy  Since patient has been smoking only for the last few months and less than half a pack a day-will proceed with nicotine replacement therapy  Patient is complaining of residual cold symptoms including cough, postnasal drip, she denies fever or sinus pressure  Patient was seen in the office in late December and treated with Augmentin followed by prednisone taper  She is  afebrile  She denies chest tightness or wheezing  Cough is quite bothersome, worse at night  Chronic headaches:  Patient uses Relpax and tramadol as needed, symptoms of migraines overall have improved significantly  Acid reflux disease:  Patient remains on Protonix  Health maintenance:  Patient is up-to-date with gyn exam and mammography        Cough   Associated symptoms include headaches (As outlined in HPI) and postnasal drip  Pertinent negatives include no shortness of breath or wheezing  Review of Systems   Review of Systems   Constitutional: Positive for fatigue  HENT: Positive for congestion and postnasal drip  Eyes: Negative  Respiratory: Positive for cough  Negative for chest tightness, shortness of breath and wheezing  Cardiovascular: Negative  Gastrointestinal: Negative  Endocrine: Negative  Genitourinary: Negative  Musculoskeletal: Negative  Allergic/Immunologic: Negative  Neurological: Positive for headaches (As outlined in HPI)  Psychiatric/Behavioral: The patient is nervous/anxious  As outlined in HPI       Active Problem List     Patient Active Problem List   Diagnosis    Depression with anxiety    Allergic rhinitis    Esophageal reflux    Headache, migraine    Obesity (BMI 30-39  9)    Vitamin D deficiency       Past Medical History:  No past medical history on file      Past Surgical History:  Past Surgical History:   Procedure Laterality Date    NO PAST SURGERIES         Family History:  Family History   Problem Relation Age of Onset    Diabetes Mother  Cerebral aneurysm Mother     Hypertension Mother     Heart attack Father 58    Hypertension Brother     Breast cancer Maternal Aunt        Social History:  Social History     Social History    Marital status: /Civil Union     Spouse name: N/A    Number of children: N/A    Years of education: N/A     Occupational History    Not on file  Social History Main Topics    Smoking status: Current Every Day Smoker    Smokeless tobacco: Never Used    Alcohol use Yes      Comment: social    Drug use: No    Sexual activity: Not on file     Other Topics Concern    Not on file     Social History Narrative    No narrative on file       Objective     Vitals:    01/17/19 1620   BP: 114/86   Pulse: 88   Resp: 16   Temp: 98 3 °F (36 8 °C)   TempSrc: Tympanic   Weight: 102 kg (225 lb 6 4 oz)   Height: 5' 4" (1 626 m)     Wt Readings from Last 3 Encounters:   01/17/19 102 kg (225 lb 6 4 oz)   12/27/18 101 kg (223 lb)   11/14/17 94 8 kg (209 lb)       Physical Exam   Constitutional: She is oriented to person, place, and time  She appears well-developed and well-nourished  HENT:   Head: Normocephalic and atraumatic  Mouth/Throat: No oropharyngeal exudate (Erythema of oropharynx, postnasal drip, light green)  Eyes: Conjunctivae are normal    Neck: Neck supple  Carotid bruit is not present  No thyromegaly present  Cardiovascular: Normal rate, regular rhythm and normal heart sounds  No murmur heard  Pulmonary/Chest: Effort normal and breath sounds normal  No respiratory distress  She has no wheezes  She has no rales  Abdominal: Soft  Bowel sounds are normal  She exhibits no distension and no abdominal bruit  Musculoskeletal: Normal range of motion  She exhibits no edema  Lymphadenopathy:     She has no cervical adenopathy  Neurological: She is alert and oriented to person, place, and time  No cranial nerve deficit  Coordination normal    Psychiatric: She has a normal mood and affect   Her behavior is normal    Nursing note and vitals reviewed  Pertinent Laboratory/Diagnostic Studies:  No results found for: GLUCOSE, BUN, CREATININE, CALCIUM, NA, K, CO2, CL  No results found for: ALT, AST, GGT, ALKPHOS, BILITOT    No results found for: WBC, HGB, HCT, MCV, PLT    No results found for: TSH    No results found for: CHOL  No results found for: TRIG  No results found for: HDL  No results found for: LDLCALC  No results found for: HGBA1C    No results found for this or any previous visit      Orders Placed This Encounter   Procedures    CBC    Comprehensive metabolic panel    Lipid panel    TSH, 3rd generation    Vitamin D 25 hydroxy       ALLERGIES:  Allergies   Allergen Reactions    Clarithromycin Other (See Comments)     nausea    Sulfa Antibiotics GI Intolerance    Sulfamethoxazole-Trimethoprim Other (See Comments)     rash       Current Medications     Current Outpatient Prescriptions   Medication Sig Dispense Refill    eletriptan (RELPAX) 40 MG tablet Take 1 tablet (40 mg total) by mouth once as needed for migraine for up to 1 dose may repeat in 2 hours if necessary 9 tablet 0    fluticasone (FLONASE) 50 mcg/act nasal spray 2 sprays into each nostril daily 1 Bottle 0    pantoprazole (PROTONIX) 40 mg tablet TAKE 1 TABLET DAILY 90 tablet 1    traMADol (ULTRAM) 50 mg tablet Take 1 tablet (50 mg total) by mouth 3 (three) times a day as needed for moderate pain 30 tablet 1    valACYclovir (VALTREX) 500 mg tablet Take 500 mg by mouth      venlafaxine (EFFEXOR-XR) 150 mg 24 hr capsule Take 1 capsule once a day 90 capsule 1    azithromycin (ZITHROMAX) 250 mg tablet Take 2 tablets today then 1 tablet daily x 4 days 6 tablet 0    guaifenesin-codeine (GUAIFENESIN AC) 100-10 MG/5ML liquid Take 5-10 mL every 4-6 hours as needed for cough 240 mL 0    nicotine (NICODERM CQ) 14 mg/24hr TD 24 hr patch Place 1 patch on the skin every 24 hours 28 patch 0     No current facility-administered medications for this visit            Health Maintenance     Health Maintenance   Topic Date Due    MAMMOGRAM  1975    Pneumococcal PPSV23 Medium Risk Adult (1 of 1 - PPSV23) 04/22/1995    DTaP,Tdap,and Td Vaccines (1 - Tdap) 04/22/1997    PAP SMEAR  04/22/1997    Depression Screening PHQ  12/27/2019    INFLUENZA VACCINE  Completed     Immunization History   Administered Date(s) Administered    Influenza 10/16/2015, 10/31/2018    Influenza TIV (IM) 10/06/2016       Sherine Lam MD

## 2019-01-18 NOTE — TELEPHONE ENCOUNTER
----- Message from [facility] sent at 1/17/2019  6:28 PM EST -----  Regarding: Prescription Question  Contact: [phone]  Dr Estrella Francisco  The pharmacy will not cover the RX as written  Can you please send on a new one   Thank you

## 2019-01-18 NOTE — TELEPHONE ENCOUNTER
I spoke with patient last night, prescription plan did not cover Zithromax 500 mg daily 5 days that I have prescribed, we did switch medication to Zithromax Z-Yang, patient is aware

## 2019-01-20 NOTE — ASSESSMENT & PLAN NOTE
Effexor ER, will increase dose to 150 mg daily    Abortive regimen includes Relpax, tramadol and ibuprofen

## 2019-02-20 DIAGNOSIS — R51.9 NONINTRACTABLE HEADACHE, UNSPECIFIED CHRONICITY PATTERN, UNSPECIFIED HEADACHE TYPE: ICD-10-CM

## 2019-02-20 RX ORDER — TRAMADOL HYDROCHLORIDE 50 MG/1
50 TABLET ORAL 3 TIMES DAILY PRN
Qty: 30 TABLET | Refills: 0 | Status: SHIPPED | OUTPATIENT
Start: 2019-02-20 | End: 2019-03-15 | Stop reason: SDUPTHER

## 2019-03-15 DIAGNOSIS — R51.9 NONINTRACTABLE HEADACHE, UNSPECIFIED CHRONICITY PATTERN, UNSPECIFIED HEADACHE TYPE: ICD-10-CM

## 2019-03-15 RX ORDER — TRAMADOL HYDROCHLORIDE 50 MG/1
50 TABLET ORAL 3 TIMES DAILY PRN
Qty: 30 TABLET | Refills: 0 | Status: SHIPPED | OUTPATIENT
Start: 2019-03-15 | End: 2019-04-14 | Stop reason: SDUPTHER

## 2019-03-23 DIAGNOSIS — K21.9 CHRONIC GERD: ICD-10-CM

## 2019-03-24 RX ORDER — PANTOPRAZOLE SODIUM 40 MG/1
TABLET, DELAYED RELEASE ORAL
Qty: 90 TABLET | Refills: 1 | Status: SHIPPED | OUTPATIENT
Start: 2019-03-24 | End: 2020-04-09 | Stop reason: SDUPTHER

## 2019-04-14 DIAGNOSIS — R51.9 NONINTRACTABLE HEADACHE, UNSPECIFIED CHRONICITY PATTERN, UNSPECIFIED HEADACHE TYPE: ICD-10-CM

## 2019-04-16 RX ORDER — TRAMADOL HYDROCHLORIDE 50 MG/1
50 TABLET ORAL 3 TIMES DAILY PRN
Qty: 30 TABLET | Refills: 0 | Status: SHIPPED | OUTPATIENT
Start: 2019-04-16 | End: 2019-05-07 | Stop reason: SDUPTHER

## 2019-04-16 RX ORDER — TRAMADOL HYDROCHLORIDE 50 MG/1
50 TABLET ORAL 3 TIMES DAILY PRN
Qty: 30 TABLET | Refills: 0 | OUTPATIENT
Start: 2019-04-16

## 2019-05-07 DIAGNOSIS — R51.9 NONINTRACTABLE HEADACHE, UNSPECIFIED CHRONICITY PATTERN, UNSPECIFIED HEADACHE TYPE: ICD-10-CM

## 2019-05-09 RX ORDER — TRAMADOL HYDROCHLORIDE 50 MG/1
50 TABLET ORAL 3 TIMES DAILY PRN
Qty: 30 TABLET | Refills: 0 | Status: SHIPPED | OUTPATIENT
Start: 2019-05-09 | End: 2019-05-10 | Stop reason: SDUPTHER

## 2019-05-10 DIAGNOSIS — R51.9 NONINTRACTABLE HEADACHE, UNSPECIFIED CHRONICITY PATTERN, UNSPECIFIED HEADACHE TYPE: ICD-10-CM

## 2019-05-10 RX ORDER — TRAMADOL HYDROCHLORIDE 50 MG/1
50 TABLET ORAL 3 TIMES DAILY PRN
Qty: 30 TABLET | Refills: 0 | Status: SHIPPED | OUTPATIENT
Start: 2019-05-10 | End: 2019-06-10 | Stop reason: SDUPTHER

## 2019-06-10 DIAGNOSIS — R51.9 NONINTRACTABLE HEADACHE, UNSPECIFIED CHRONICITY PATTERN, UNSPECIFIED HEADACHE TYPE: ICD-10-CM

## 2019-06-10 RX ORDER — TRAMADOL HYDROCHLORIDE 50 MG/1
50 TABLET ORAL 3 TIMES DAILY PRN
Qty: 30 TABLET | Refills: 0 | Status: SHIPPED | OUTPATIENT
Start: 2019-06-10 | End: 2019-08-07 | Stop reason: SDUPTHER

## 2019-06-11 DIAGNOSIS — F41.8 DEPRESSION WITH ANXIETY: Primary | ICD-10-CM

## 2019-06-11 RX ORDER — ALPRAZOLAM 0.5 MG/1
TABLET ORAL
Qty: 20 TABLET | Refills: 0 | Status: SHIPPED | OUTPATIENT
Start: 2019-06-11 | End: 2019-10-14 | Stop reason: ALTCHOICE

## 2019-07-01 DIAGNOSIS — F41.8 DEPRESSION WITH ANXIETY: ICD-10-CM

## 2019-07-01 RX ORDER — VENLAFAXINE HYDROCHLORIDE 150 MG/1
CAPSULE, EXTENDED RELEASE ORAL
Qty: 90 CAPSULE | Refills: 1 | Status: SHIPPED | OUTPATIENT
Start: 2019-07-01 | End: 2019-12-28 | Stop reason: SDUPTHER

## 2019-08-07 DIAGNOSIS — R51.9 NONINTRACTABLE HEADACHE, UNSPECIFIED CHRONICITY PATTERN, UNSPECIFIED HEADACHE TYPE: ICD-10-CM

## 2019-08-07 RX ORDER — TRAMADOL HYDROCHLORIDE 50 MG/1
50 TABLET ORAL 3 TIMES DAILY PRN
Qty: 30 TABLET | Refills: 0 | Status: SHIPPED | OUTPATIENT
Start: 2019-08-07 | End: 2019-09-26 | Stop reason: SDUPTHER

## 2019-08-07 NOTE — TELEPHONE ENCOUNTER
Patient called asking if you would give her a work note for today due to her having a migraine today  Please advise patient at [phone]

## 2019-08-07 NOTE — TELEPHONE ENCOUNTER
----- Message from Partha Parker LPN sent at 6/6/5871 10:21 AM EDT -----  Regarding: FW: Non-Urgent Medical Question  Contact: [phone]      ----- Message -----  From: Carrillo Hernandez  Sent: 8/7/2019  10:09 AM EDT  To: [facility]  Subject: Non-Urgent Medical Question                      Dr Oquendo Ends    I am home today with a terrible migraine  Could you please fax a work excuse for me  I am not allowed an unexcused absence   [address] [facility]

## 2019-08-07 NOTE — TELEPHONE ENCOUNTER
Please contact patient  Okay to provide with work excuse note for today and fax as requested  Please let her know that I did refill her tramadol  If her headache is not improving-I need to see her in the office for re-evaluation    Thank you

## 2019-08-08 ENCOUNTER — TELEPHONE (OUTPATIENT)
Dept: FAMILY MEDICINE CLINIC | Facility: CLINIC | Age: 44
End: 2019-08-08

## 2019-08-08 NOTE — TELEPHONE ENCOUNTER
----- Message from Partha Parker LPN sent at 3/9/1142  9:21 AM EDT -----  Regarding: FW: Non-Urgent Medical Question  Contact: [phone]      ----- Message -----  From: Carrillo Hernandez  Sent: 8/7/2019   6:10 PM EDT  To: [facility]  Subject: Non-Urgent Jonathan Her  I'm sorry to bother you again  I left a message with the staff as well  I really need to know if I can get a work excuse for today  I had a terrible migraine  I tried to go in late, but just couldn't  I will lose my job if I don't have an excuse  Please let me know  The note can be faxed to [phone]      Thanks  Carrillo Hernandez

## 2019-08-08 NOTE — TELEPHONE ENCOUNTER
Call placed to Pt to let her know that the work excuses was done and faxed to her employer   L/M for her

## 2019-08-08 NOTE — TELEPHONE ENCOUNTER
Please contact patient and follow-up  I did authorize work excuse for yesterday as she has requested    Thank you

## 2019-09-25 DIAGNOSIS — R51.9 NONINTRACTABLE HEADACHE, UNSPECIFIED CHRONICITY PATTERN, UNSPECIFIED HEADACHE TYPE: ICD-10-CM

## 2019-09-26 DIAGNOSIS — R51.9 NONINTRACTABLE HEADACHE, UNSPECIFIED CHRONICITY PATTERN, UNSPECIFIED HEADACHE TYPE: ICD-10-CM

## 2019-09-27 RX ORDER — TRAMADOL HYDROCHLORIDE 50 MG/1
50 TABLET ORAL 3 TIMES DAILY PRN
Qty: 30 TABLET | Refills: 0 | OUTPATIENT
Start: 2019-09-27

## 2019-09-27 RX ORDER — TRAMADOL HYDROCHLORIDE 50 MG/1
50 TABLET ORAL 2 TIMES DAILY PRN
Qty: 30 TABLET | Refills: 0 | Status: SHIPPED | OUTPATIENT
Start: 2019-09-27 | End: 2019-10-31 | Stop reason: SDUPTHER

## 2019-10-14 ENCOUNTER — HOSPITAL ENCOUNTER (OUTPATIENT)
Dept: RADIOLOGY | Facility: HOSPITAL | Age: 44
Discharge: HOME | End: 2019-10-14
Payer: COMMERCIAL

## 2019-10-14 ENCOUNTER — TRANSCRIBE ORDERS (OUTPATIENT)
Dept: RADIOLOGY | Facility: HOSPITAL | Age: 44
End: 2019-10-14

## 2019-10-14 ENCOUNTER — TELEPHONE (OUTPATIENT)
Dept: FAMILY MEDICINE CLINIC | Facility: CLINIC | Age: 44
End: 2019-10-14

## 2019-10-14 ENCOUNTER — OFFICE VISIT (OUTPATIENT)
Dept: FAMILY MEDICINE CLINIC | Facility: CLINIC | Age: 44
End: 2019-10-14
Payer: COMMERCIAL

## 2019-10-14 VITALS
TEMPERATURE: 99.2 F | WEIGHT: 227 LBS | OXYGEN SATURATION: 98 % | HEART RATE: 80 BPM | BODY MASS INDEX: 38.96 KG/M2 | SYSTOLIC BLOOD PRESSURE: 120 MMHG | DIASTOLIC BLOOD PRESSURE: 70 MMHG

## 2019-10-14 DIAGNOSIS — R10.2 PELVIC PAIN: ICD-10-CM

## 2019-10-14 DIAGNOSIS — R39.9 UTI SYMPTOMS: Primary | ICD-10-CM

## 2019-10-14 DIAGNOSIS — R10.2 PELVIC PAIN: Primary | ICD-10-CM

## 2019-10-14 LAB
BACTERIA UR QL AUTO: ABNORMAL /HPF
BILIRUB UR QL STRIP: ABNORMAL
CLARITY UR: CLEAR
COLOR UR: ABNORMAL
GLUCOSE UR STRIP-MCNC: NEGATIVE MG/DL
HGB UR QL STRIP.AUTO: ABNORMAL
HYALINE CASTS #/AREA URNS LPF: ABNORMAL /LPF
KETONES UR STRIP-MCNC: ABNORMAL MG/DL
LEUKOCYTE ESTERASE UR QL STRIP: ABNORMAL
NITRITE UR QL STRIP: NEGATIVE
NON-SQ EPI CELLS URNS QL MICRO: ABNORMAL /HPF
PH UR STRIP.AUTO: 6.5 [PH]
PROT UR STRIP-MCNC: ABNORMAL MG/DL
RBC #/AREA URNS AUTO: ABNORMAL /HPF
SL AMB  POCT GLUCOSE, UA: NEGATIVE
SL AMB LEUKOCYTE ESTERASE,UA: ABNORMAL
SL AMB POCT BILIRUBIN,UA: NEGATIVE
SL AMB POCT BLOOD,UA: ABNORMAL
SL AMB POCT CLARITY,UA: ABNORMAL
SL AMB POCT COLOR,UA: ABNORMAL
SL AMB POCT KETONES,UA: NEGATIVE
SL AMB POCT NITRITE,UA: NEGATIVE
SL AMB POCT PH,UA: 6.5
SL AMB POCT SPECIFIC GRAVITY,UA: 1.01
SL AMB POCT URINE PROTEIN: ABNORMAL
SL AMB POCT UROBILINOGEN: 0.2
SP GR UR STRIP.AUTO: 1.02 (ref 1–1.03)
UROBILINOGEN UR QL STRIP.AUTO: 1 E.U./DL
WBC #/AREA URNS AUTO: ABNORMAL /HPF

## 2019-10-14 PROCEDURE — 87086 URINE CULTURE/COLONY COUNT: CPT | Performed by: NURSE PRACTITIONER

## 2019-10-14 PROCEDURE — 87147 CULTURE TYPE IMMUNOLOGIC: CPT | Performed by: NURSE PRACTITIONER

## 2019-10-14 PROCEDURE — 81003 URINALYSIS AUTO W/O SCOPE: CPT | Performed by: NURSE PRACTITIONER

## 2019-10-14 PROCEDURE — 76770 US EXAM ABDO BACK WALL COMP: CPT

## 2019-10-14 PROCEDURE — 81001 URINALYSIS AUTO W/SCOPE: CPT | Performed by: NURSE PRACTITIONER

## 2019-10-14 PROCEDURE — 99213 OFFICE O/P EST LOW 20 MIN: CPT | Performed by: NURSE PRACTITIONER

## 2019-10-14 RX ORDER — CIPROFLOXACIN 500 MG/1
500 TABLET, FILM COATED ORAL EVERY 12 HOURS SCHEDULED
Qty: 14 TABLET | Refills: 0 | Status: SHIPPED | OUTPATIENT
Start: 2019-10-14 | End: 2019-10-16 | Stop reason: ALTCHOICE

## 2019-10-14 RX ORDER — NAPROXEN 500 MG/1
500 TABLET ORAL 2 TIMES DAILY WITH MEALS
Qty: 30 TABLET | Refills: 0 | Status: SHIPPED | OUTPATIENT
Start: 2019-10-14 | End: 2020-03-05 | Stop reason: ALTCHOICE

## 2019-10-14 NOTE — LETTER
October 14, 2019     Patient: Verónica Sheppard   YOB: 1975   Date of Visit: 10/14/2019       To Whom it May Concern:    Verónica Sheppard is under my professional care  She was seen in my office on 10/14/2019  She may return to work on 10/17/2019  If you have any questions or concerns, please don't hesitate to call           Sincerely,          HERNAN Benitez        CC: No Recipients

## 2019-10-14 NOTE — TELEPHONE ENCOUNTER
Spoke with pt, explained that Dr Placido Farooq was booked for the day but that our NP would provide her excellent care and the at NP would consult with Dr Placido Farooq if needed  Pt agreed to this plan of care

## 2019-10-14 NOTE — TELEPHONE ENCOUNTER
Patient was seen at her OB/GYN on Fri & ER on Sat for abdominal pain  They can't find anything  (She wants appt for Dr Marleen Krishna   I advised her nothing available today w/Dr Marleen Krishna & was able to put her on Izabela's schedule for 11:30 however she is insistent on leaving a message for Dr Marleen Krishna or her nurse to call her back)

## 2019-10-14 NOTE — PROGRESS NOTES
FAMILY PRACTICE OFFICE VISIT       NAME: Sanjay Giordano  AGE: 38 y o  SEX: female       : 1975        MRN: [id number]    DATE: 10/14/2019    Assessment and Plan     Problem List Items Addressed This Visit     None      Visit Diagnoses     UTI symptoms    -  Primary    Relevant Medications    ciprofloxacin (CIPRO) 500 mg tablet    Other Relevant Orders    POCT urine dip auto non-scope (Completed)    Urinalysis with microscopic (Completed)    Urine culture    Pelvic pain        Relevant Medications    naproxen (NAPROSYN) 500 mg tablet    ciprofloxacin (CIPRO) 500 mg tablet        1  UTI symptoms  ciprofloxacin (CIPRO) 500 mg tablet    POCT urine dip auto non-scope    Urinalysis with microscopic    Urine culture    CANCELED: Urinalysis with microscopic    CANCELED: Urine culture   2  Pelvic pain  naproxen (NAPROSYN) 500 mg tablet    ciprofloxacin (CIPRO) 500 mg tablet     This 60-year-old female presents today for persistent, severe pelvic pain for the past 5 days  She has been evaluated by gyn on 10/11  Pelvic ultrasound showed left small ovarian cyst and uterine fibroid  On 10/12 she presented to the emergency room for evaluation of the same symptoms  CT abdomen/pelvis with IV contrast was normal   Negative pregnancy test   Review of hospital records shows she had moderate blood in her urine, otherwise urinalysis was clear  [place] [address]  CMP normal   Lipase 50  On exam today, she has significant suprapubic, pubic symphysis, and left lower quadrant pain on light palpation  There is right lower quadrant tenderness as well, but not as severe  In office urine dip today is positive for trace leukocytes and large amount of blood  Will send urinalysis and culture  Will begin on ciprofloxacin 500 mg twice daily for 7 days, prophylaxis for possible urinary tract infection, while urine culture is pending   Instructed to call if symptoms are not significantly improving over the next 48 hours  Office will call with results of urine culture when available  Push fluids and rest   She will try naproxen 500 mg twice daily as needed for pain  Reviewed plan of care with Dr Laura Hanson  Will proceed with ultrasound of kidneys and bladder  Office will call patient with results when available  Chief Complaint     Chief Complaint   Patient presents with    Abdominal Pain     PS: 8-10, worse when moving  History of Present Illness     Pedro Fuentes is a 45-year-old female presenting today for pelvic pain  Pain started mildly on 10/09 in left groin area  Pain has worsened and now settled in pelvic region  On 10/11 she went to her gynecologist for evaluation  Pelvic ultrasound was completed and showed small left ovarian cyst and myalgias  She was prescribed a small supply of Percocet, which she has been taking  This has been helping to take the edge off pain  On 10/12, pain was persistent and she went to the emergency room  CT abdomen/pelvis was normal   Pregnancy test was negative  Pain continues to be persistent today  Sitting completely still helps decrease pain  Any movement causes pain to increase to 10/10 on 0-10 pain scale  Most comfortable position is sitting up and leaning forward  Denies diarrhea or constipation  Had a normal bowel movement this morning  Denies fevers chills  +fatigue  She does have intermittent night sweats, but this has been occurring for many months prior to this starting  She does have bilateral low back pain discomfort, which she attributes to laying around for the past 4 days  Denies urinary frequency, burning, or urgency  She does have bladder pressure and aching when her bladder is full  Review of Systems   Review of Systems   Constitutional: Positive for diaphoresis (night sweats as noted in HPI) and fatigue  Negative for chills and fever  Respiratory: Negative      Cardiovascular: Negative for chest pain, palpitations and leg swelling  Gastrointestinal: Negative for abdominal distention, abdominal pain, anal bleeding, blood in stool, constipation, diarrhea, nausea and vomiting  Genitourinary: Positive for pelvic pain  Negative for difficulty urinating, dysuria, flank pain, frequency, hematuria, urgency, vaginal bleeding, vaginal discharge and vaginal pain  Musculoskeletal: Positive for back pain (mild low back pain as noted in HPI)  Negative for myalgias  Skin: Negative for rash  Active Problem List     Patient Active Problem List   Diagnosis    Depression with anxiety    Allergic rhinitis    Esophageal reflux    Headache, migraine    Obesity (BMI 30-39  9)    Vitamin D deficiency       Past Medical History:  No past medical history on file      Past Surgical History:  Past Surgical History:   Procedure Laterality Date    NO PAST SURGERIES         Family History:  Family History   Problem Relation Age of Onset    Diabetes Mother     Cerebral aneurysm Mother     Hypertension Mother     Heart attack Father 58    Hypertension Brother     Breast cancer Maternal Aunt        Social History:  Social History     Socioeconomic History    Marital status: /Civil Union     Spouse name: Not on file    Number of children: Not on file    Years of education: Not on file    Highest education level: Not on file   Occupational History    Not on file   Social Needs    Financial resource strain: Not on file    Food insecurity:     Worry: Not on file     Inability: Not on file    Transportation needs:     Medical: Not on file     Non-medical: Not on file   Tobacco Use    Smoking status: Current Every Day Smoker    Smokeless tobacco: Never Used   Substance and Sexual Activity    Alcohol use: Yes     Comment: social    Drug use: No    Sexual activity: Not on file   Lifestyle    Physical activity:     Days per week: Not on file     Minutes per session: Not on file    Stress: Not on file Relationships    Social connections:     Talks on phone: Not on file     Gets together: Not on file     Attends Worship service: Not on file     Active member of club or organization: Not on file     Attends meetings of clubs or organizations: Not on file     Relationship status: Not on file    Intimate partner violence:     Fear of current or ex partner: Not on file     Emotionally abused: Not on file     Physically abused: Not on file     Forced sexual activity: Not on file   Other Topics Concern    Not on file   Social History Narrative    Not on file       I have reviewed the patient's medical history in detail; there are no changes to the history as noted in the electronic medical record  Objective     Vitals:    10/14/19 1132 10/14/19 1227   BP: 120/70    BP Location: Right arm    Patient Position: Sitting    Cuff Size: Standard    Pulse: 100 80   Temp: 99 2 °F (37 3 °C)    SpO2: 98%    Weight: 103 kg (227 lb)      Wt Readings from Last 3 Encounters:   10/14/19 103 kg (227 lb)   01/17/19 102 kg (225 lb 6 4 oz)   12/27/18 101 kg (223 lb)     Body mass index is 38 96 kg/m²  PHQ-9 Depression Screening    PHQ-9:    Frequency of the following problems over the past two weeks:            Physical Exam   Constitutional: She is oriented to person, place, and time  She appears well-developed and well-nourished  She does not appear ill  She appears distressed (obvious pain with moving )  HENT:   Head: Normocephalic and atraumatic  Right Ear: Tympanic membrane, external ear and ear canal normal    Left Ear: Tympanic membrane, external ear and ear canal normal    Mouth/Throat: Oropharynx is clear and moist    Eyes: Conjunctivae are normal    Neck: Normal range of motion  Neck supple  Cardiovascular: Normal rate, regular rhythm and normal heart sounds  Pulmonary/Chest: Effort normal and breath sounds normal    Abdominal: Soft   Bowel sounds are normal  There is tenderness (significant tenderness to light palpation of suprapubic and LLQ) in the right lower quadrant, suprapubic area and left lower quadrant  Pubic symphysis tenderness   Musculoskeletal: She exhibits no edema  Lymphadenopathy:     She has no cervical adenopathy  Neurological: She is alert and oriented to person, place, and time  Skin: No rash noted  Psychiatric: She has a normal mood and affect  Nursing note and vitals reviewed  ALLERGIES:  Allergies   Allergen Reactions    Clarithromycin Other (See Comments)     nausea    Sulfa Antibiotics GI Intolerance    Sulfamethoxazole-Trimethoprim Other (See Comments)     rash       Current Medications     Current Outpatient Medications   Medication Sig Dispense Refill    pantoprazole (PROTONIX) 40 mg tablet TAKE 1 TABLET DAILY 90 tablet 1    traMADol (ULTRAM) 50 mg tablet Take 1 tablet (50 mg total) by mouth 2 (two) times a day as needed for moderate pain (headache) 30 tablet 0    valACYclovir (VALTREX) 500 mg tablet Take 500 mg by mouth      venlafaxine (EFFEXOR-XR) 150 mg 24 hr capsule TAKE 1 CAPSULE DAILY 90 capsule 1    ciprofloxacin (CIPRO) 500 mg tablet Take 1 tablet (500 mg total) by mouth every 12 (twelve) hours for 7 days 14 tablet 0    naproxen (NAPROSYN) 500 mg tablet Take 1 tablet (500 mg total) by mouth 2 (two) times a day with meals 30 tablet 0     No current facility-administered medications for this visit            Health Maintenance     Health Maintenance   Topic Date Due    MAMMOGRAM  1975    Pneumococcal Vaccine: Pediatrics (0 to 5 Years) and At-Risk Patients (6 to 59 Years) (1 of 1 - PPSV23) 04/22/1982    DTaP,Tdap,and Td Vaccines (1 - Tdap) 04/22/1997    Cervical Cancer Screening  04/22/1997    INFLUENZA VACCINE  07/01/2019    BMI: Followup Plan  01/20/2020    BMI: Adult  10/14/2020    Pneumococcal Vaccine: 65+ Years (1 of 2 - PCV13) 04/22/2041    HEPATITIS B VACCINES  Aged Out     Immunization History   Administered Date(s) Administered    INFLUENZA 10/16/2015, 10/31/2018    Influenza TIV (IM) 10/06/2016       HERNAN Benitez

## 2019-10-14 NOTE — TELEPHONE ENCOUNTER
Please contact patient  I would like her to do an US of kidneys and bladder stat  I placed orders, can you please arrange for patient      TY

## 2019-10-14 NOTE — TELEPHONE ENCOUNTER
Appt made for pt for today at 5 pm [facility] instructions given to drink 24 oz water 1 hour prior which is now, go to 1st floor Radiology to register, and use entrance B  Pt aware

## 2019-10-15 ENCOUNTER — TELEPHONE (OUTPATIENT)
Dept: FAMILY MEDICINE CLINIC | Facility: CLINIC | Age: 44
End: 2019-10-15

## 2019-10-16 ENCOUNTER — TELEPHONE (OUTPATIENT)
Dept: FAMILY MEDICINE CLINIC | Facility: CLINIC | Age: 44
End: 2019-10-16

## 2019-10-16 DIAGNOSIS — R10.2 PELVIC PAIN: ICD-10-CM

## 2019-10-16 DIAGNOSIS — R31.9 URINARY TRACT INFECTION WITH HEMATURIA, SITE UNSPECIFIED: Primary | ICD-10-CM

## 2019-10-16 DIAGNOSIS — R31.9 HEMATURIA, UNSPECIFIED TYPE: ICD-10-CM

## 2019-10-16 DIAGNOSIS — N39.0 URINARY TRACT INFECTION WITH HEMATURIA, SITE UNSPECIFIED: Primary | ICD-10-CM

## 2019-10-16 LAB
BACTERIA UR CULT: ABNORMAL
BACTERIA UR CULT: ABNORMAL

## 2019-10-16 RX ORDER — AMOXICILLIN 875 MG/1
875 TABLET, COATED ORAL 2 TIMES DAILY
Qty: 10 TABLET | Refills: 0 | Status: SHIPPED | OUTPATIENT
Start: 2019-10-16 | End: 2019-10-21 | Stop reason: SDUPTHER

## 2019-10-16 NOTE — TELEPHONE ENCOUNTER
Patient called to find out what her Urine results  She would like to know what she needs to do?  Please call to advise

## 2019-10-16 NOTE — RESULT ENCOUNTER NOTE
She does have a small amount of bacteria in her urine  The bacteria is group beta strep which is commonly found on female genitalia  Cipro, does not always work against this bacteria  Stop taking cipro  Start amoxicillin 875 mg twice daily for 5 days  She did have a large amount of blood in her urinalysis  Please inquire if she is feeling any better? If her symptoms have not improved at all, I would like her to follow up with urology  Please facilitate a urology appointment asap  Thank you

## 2019-10-21 ENCOUNTER — TELEPHONE (OUTPATIENT)
Dept: FAMILY MEDICINE CLINIC | Facility: CLINIC | Age: 44
End: 2019-10-21

## 2019-10-21 DIAGNOSIS — N39.0 URINARY TRACT INFECTION WITH HEMATURIA, SITE UNSPECIFIED: ICD-10-CM

## 2019-10-21 DIAGNOSIS — R31.9 URINARY TRACT INFECTION WITH HEMATURIA, SITE UNSPECIFIED: ICD-10-CM

## 2019-10-21 RX ORDER — AMOXICILLIN 875 MG/1
875 TABLET, COATED ORAL 2 TIMES DAILY
Qty: 4 TABLET | Refills: 0 | Status: SHIPPED | OUTPATIENT
Start: 2019-10-21 | End: 2019-10-23

## 2019-10-21 NOTE — TELEPHONE ENCOUNTER
I sent 2 more days of amoxicillin to the pharmacy  There was only a small amount of bacteria in the urine  Severity of symptoms does not correlate well with only a amount of bacteria in the urine, therefore I am not sure this was the cause of recent symptoms  I still would like her to follow up with urology  However, at the very least she needs to repeat urinalysis, as she had a lot of blood in her urine in last check  We need to make sure this clears  I placed orders  Please inquire if her pelvic pain is improving?

## 2019-10-21 NOTE — TELEPHONE ENCOUNTER
Spoke with pt,NP instructions given  She states she does feel better  I did advise her to contact [address] [facility]

## 2019-10-21 NOTE — TELEPHONE ENCOUNTER
Patient called stating she thinks she should have a couple more days of antibiotics  She stated her UTI is much better but she still is having discomfort, she finished her antibiotics yesterday (Sunday, 10/20/2019)  Please advise patient if she can have a couple more days at [phone]  Patient uses [facility] on [address]

## 2019-10-22 ENCOUNTER — TELEPHONE (OUTPATIENT)
Dept: FAMILY MEDICINE CLINIC | Facility: CLINIC | Age: 44
End: 2019-10-22

## 2019-10-22 NOTE — TELEPHONE ENCOUNTER
Patient called stating she made an appointment with [facility] and has an appointment on October 31, 2019  She wants to make sure the appointment is okay for that long  Please advise patient at [phone]

## 2019-10-31 DIAGNOSIS — R51.9 NONINTRACTABLE HEADACHE, UNSPECIFIED CHRONICITY PATTERN, UNSPECIFIED HEADACHE TYPE: ICD-10-CM

## 2019-10-31 RX ORDER — TRAMADOL HYDROCHLORIDE 50 MG/1
50 TABLET ORAL 2 TIMES DAILY PRN
Qty: 30 TABLET | Refills: 0 | OUTPATIENT
Start: 2019-10-31

## 2019-10-31 RX ORDER — TRAMADOL HYDROCHLORIDE 50 MG/1
50 TABLET ORAL 2 TIMES DAILY PRN
Qty: 30 TABLET | Refills: 0 | Status: SHIPPED | OUTPATIENT
Start: 2019-10-31 | End: 2019-11-29 | Stop reason: SDUPTHER

## 2019-11-07 ENCOUNTER — OFFICE VISIT (OUTPATIENT)
Dept: UROLOGY | Facility: AMBULATORY SURGERY CENTER | Age: 44
End: 2019-11-07
Payer: COMMERCIAL

## 2019-11-07 VITALS
WEIGHT: 226 LBS | SYSTOLIC BLOOD PRESSURE: 128 MMHG | DIASTOLIC BLOOD PRESSURE: 82 MMHG | BODY MASS INDEX: 38.58 KG/M2 | HEIGHT: 64 IN | HEART RATE: 82 BPM

## 2019-11-07 DIAGNOSIS — R31.9 HEMATURIA, UNSPECIFIED TYPE: ICD-10-CM

## 2019-11-07 DIAGNOSIS — N39.0 URINARY TRACT INFECTION WITH HEMATURIA, SITE UNSPECIFIED: ICD-10-CM

## 2019-11-07 DIAGNOSIS — R31.9 URINARY TRACT INFECTION WITH HEMATURIA, SITE UNSPECIFIED: ICD-10-CM

## 2019-11-07 DIAGNOSIS — R10.2 PELVIC PAIN: ICD-10-CM

## 2019-11-07 LAB
SL AMB  POCT GLUCOSE, UA: NORMAL
SL AMB LEUKOCYTE ESTERASE,UA: NORMAL
SL AMB POCT BILIRUBIN,UA: NORMAL
SL AMB POCT BLOOD,UA: NORMAL
SL AMB POCT CLARITY,UA: CLEAR
SL AMB POCT COLOR,UA: YELLOW
SL AMB POCT KETONES,UA: NORMAL
SL AMB POCT NITRITE,UA: NORMAL
SL AMB POCT PH,UA: 5
SL AMB POCT SPECIFIC GRAVITY,UA: 1
SL AMB POCT URINE PROTEIN: NORMAL
SL AMB POCT UROBILINOGEN: NORMAL

## 2019-11-07 PROCEDURE — 99244 OFF/OP CNSLTJ NEW/EST MOD 40: CPT | Performed by: NURSE PRACTITIONER

## 2019-11-07 PROCEDURE — 81002 URINALYSIS NONAUTO W/O SCOPE: CPT | Performed by: NURSE PRACTITIONER

## 2019-11-07 NOTE — PATIENT INSTRUCTIONS
Cystoscopy   AMBULATORY CARE:   A cystoscopy  is a procedure to look inside of your urethra and bladder using a cystoscope  A cystoscope is a small tube with a light and magnifying camera on the end  The procedure is used to diagnose and treat conditions of the bladder, urethra, and prostate  The procedure is also done to remove stones or blood clots from the urethra or bladder  Your healthcare provider may do other tests, such as ureteroscopy, during a cystoscopy  Prepare for your cystoscopy: You may need to stop smoking several days before your procedure, if you are having general anesthesia  Tell your healthcare provider what medicines you take  Your healthcare provider will tell you what medicines to take and not to take on the day of your procedure  You may need to stop taking medicines such as anticoagulants, aspirin, and ibuprofen several days before your procedure  He may tell you stop eating after midnight the night before your procedure  You may be asked to drink a large amount of liquids before your procedure  Make plans for someone to drive you home after your procedure  During your cystoscopy:   · You may be given general anesthesia to keep you asleep and pain free during your procedure  Your healthcare provider may give you anesthesia in your spine  With spinal anesthesia the lower part of your body will be numb  You will not feel pain during your procedure  Your healthcare provider may instead use local anesthesia that is put into your urethra and bladder  You will not feel pain, but you may be able to feel some pressure during your procedure  With local anesthesia, you may feel burning or need to urinate when the cystoscope is put in and removed  · You will be placed on your back and your feet may be placed in stirrups  The cystoscope will be will be placed through your urethra and into your bladder   The urologist will look at the walls of your urethra as the scope goes through to your bladder  Your bladder may be filled with an irrigation liquid to help your urologist see inside of your bladder more clearly   Special tools may used to remove tissue or stones  Your urologist may use a special tool to stop bleeding in your bladder  If there are blood clots in your bladder, your healthcare provider will inject an irrigation fluid into your bladder  Then he will use suction to remove the fluid and blood clots  After a cystoscopy:  After you are fully awake, you will go home  After your cystoscopy, it is normal to have pink-colored urine  It is also normal to have an increased need to urinate  You may have burning when you urinate  If you had general anesthesia, it may take at least 24 hours before you feel like your usual self  Risks of a cystoscopy: You may bleed more than expected or develop an infection  Swelling caused by the cystoscopy may cause a blockage or slow urine flow  Seek care immediately if:   · Your urine turns from pink to red, or you have clots in your urine  · You cannot urinate and your bladder feels full  · Your pain or burning becomes worse or lasts longer than 2 days  Contact your healthcare provider or urologist if:   · Your urine stays pink for longer than 3 days  · Your pain or burning becomes worse  · Your skin is itchy, swollen, or has a new rash  · You have a fever and chills  · You have questions or concerns about your condition or care  After your cystoscopy: It is normal to have pink-colored urine  It is also normal to have an increased need to urinate and burning when you urinate  If you had general anesthesia, it may take at least 24 hours before you feel like your usual self  · Drink at least 3 to 4 glasses of water daily for 2 days after your procedure  Avoid acidic juices such as orange juice and lemonade  Water can help prevent blood clots from forming   It can also help decrease the amount of acid in your urine that can cause burning  · Sit in a warm tub of water  Warm baths relieve pain and bladder spasms  · Do not have sex  until your healthcare provider tells you it is okay  Sex may increase your risk for a urinary tract infection  Medicines: You may  be given any of the following:  · Antibiotics  help treat or prevent a bacterial infection  · Acetaminophen  decreases pain and fever  It is available without a doctor's order  Ask how much to take and how often to take it  Follow directions  Read the labels of all other medicines you are using to see if they also contain acetaminophen, or ask your doctor or pharmacist  Acetaminophen can cause liver damage if not taken correctly  Do not use more than 4 grams (4,000 milligrams) total of acetaminophen in one day  · Take your medicine as directed  Contact your healthcare provider if you think your medicine is not helping or if you have side effects  Tell him or her if you are allergic to any medicine  Keep a list of the medicines, vitamins, and herbs you take  Include the amounts, and when and why you take them  Bring the list or the pill bottles to follow-up visits  Carry your medicine list with you in case of an emergency  Follow up with your healthcare provider as directed: You may need to have another cystoscopy  Write down your questions so you remember to ask them during your visits  © 2017 [employer]  Information is for End User's use only and may not be sold, redistributed or otherwise used for commercial purposes  All illustrations and images included in CareNotes® are the copyrighted property of [employer]  or [employer]  The above information is an  only  It is not intended as medical advice for individual conditions or treatments  Talk to your doctor, nurse or pharmacist before following any medical regimen to see if it is safe and effective for you

## 2019-11-07 NOTE — PROGRESS NOTES
11/7/2019    Uzma Zapata  1975  [id number]        Assessment  -Microscopic hematuria    Discussion/Plan  Mechelle Cardona is a 38 y o  female who presents in consultation     -Urine dip in office today shows presence of blood, however patient states she is on menses  Will send for culture to rule out infection due to report of dysuria  Given previous finding of 30-50 RBC on UA and smoking history, I recommend proceeding with cystoscopy for further evaluation  Recent imaging with renal US and CT scan with IV contrast was performed 1 month ago  No additional imaging at this time  Will also order repeat UA with microscopic once patient is finished current menstrual cycle  We did discuss following up with GYN if she continues to have pelvic discomfort, and urologic evaluation is negative  -Follow up with MD for cystoscopy  -All questions answered, patients agree with plan     History of Present Illness  38 y o  female who presents in consultation for microscopic hematuria  She was referred by her PCP for finding of 30-50 RBCs on urinalysis from 10/14/2019  Patient states that she initially started to have left-sided groin pain in the beginning of October  She was evaluated by her GYN at [facility]   Transvaginal, pelvis ultrasounds were obtained which identified small ovarian cyst   She also received CT abdomen pelvis with IV contrast which was unremarkable  Patient reports recurrence of groin pain and was evaluated by her PCP  UA with microscopic identified 30-50 RBC and urine culture positive for <30,000 beta hemolytic strep  She was prescribed course of amoxicillin  Patient states symptoms improved after completion of antibiotics  She is currently asymptomatic except for mild dysuria  Patient denies any additional lower urinary tract symptoms or gross hematuria  She reports a smoking history, and quit for numerous years    However, patient states she restarted 1 year ago and now smokes half pack a day   She denies any family history of bladder or kidney malignancy  Review of Systems  Review of Systems   Constitutional: Negative  HENT: Negative  Respiratory: Negative  Cardiovascular: Negative  Gastrointestinal: Negative  Genitourinary: Positive for dysuria  Negative for decreased urine volume, difficulty urinating, flank pain, frequency, hematuria and urgency  Musculoskeletal: Negative  Skin: Negative  Neurological: Negative  Psychiatric/Behavioral: Negative  Past Medical History  No past medical history on file      Past Social History  Past Surgical History:   Procedure Laterality Date    NO PAST SURGERIES         Past Family History  Family History   Problem Relation Age of Onset    Diabetes Mother     Cerebral aneurysm Mother     Hypertension Mother     Heart attack Father 58    Hypertension Brother     Breast cancer Maternal Aunt        Past Social history  Social History     Socioeconomic History    Marital status: /Civil Union     Spouse name: Not on file    Number of children: Not on file    Years of education: Not on file    Highest education level: Not on file   Occupational History    Not on file   Social Needs    Financial resource strain: Not on file    Food insecurity:     Worry: Not on file     Inability: Not on file    Transportation needs:     Medical: Not on file     Non-medical: Not on file   Tobacco Use    Smoking status: Current Every Day Smoker    Smokeless tobacco: Never Used   Substance and Sexual Activity    Alcohol use: Yes     Comment: social    Drug use: No    Sexual activity: Not on file   Lifestyle    Physical activity:     Days per week: Not on file     Minutes per session: Not on file    Stress: Not on file   Relationships    Social connections:     Talks on phone: Not on file     Gets together: Not on file     Attends Mandaeism service: Not on file     Active member of club or organization: Not on file Attends meetings of clubs or organizations: Not on file     Relationship status: Not on file    Intimate partner violence:     Fear of current or ex partner: Not on file     Emotionally abused: Not on file     Physically abused: Not on file     Forced sexual activity: Not on file   Other Topics Concern    Not on file   Social History Narrative    Not on file       Current Medications  Current Outpatient Medications   Medication Sig Dispense Refill    naproxen (NAPROSYN) 500 mg tablet Take 1 tablet (500 mg total) by mouth 2 (two) times a day with meals 30 tablet 0    pantoprazole (PROTONIX) 40 mg tablet TAKE 1 TABLET DAILY 90 tablet 1    traMADol (ULTRAM) 50 mg tablet Take 1 tablet (50 mg total) by mouth 2 (two) times a day as needed for moderate pain (headache) 30 tablet 0    valACYclovir (VALTREX) 500 mg tablet Take 500 mg by mouth      venlafaxine (EFFEXOR-XR) 150 mg 24 hr capsule TAKE 1 CAPSULE DAILY 90 capsule 1     No current facility-administered medications for this visit  Allergies  Allergies   Allergen Reactions    Clarithromycin Other (See Comments)     nausea    Sulfa Antibiotics GI Intolerance    Sulfamethoxazole-Trimethoprim Other (See Comments)     rash       Past medical history, social history, family history, medications and allergies were reviewed  Vitals  Vitals:    11/07/19 0910   BP: 128/82   BP Location: Right arm   Patient Position: Sitting   Cuff Size: Adult   Pulse: 82   Weight: 103 kg (226 lb)   Height: 5' 4" (1 626 m)       Physical Exam  Physical Exam   Constitutional: She is oriented to person, place, and time  She appears well-developed and well-nourished  HENT:   Head: Normocephalic  Eyes: Pupils are equal, round, and reactive to light  Neck: Normal range of motion  Cardiovascular: Normal rate and regular rhythm  Pulmonary/Chest: Effort normal    Abdominal: Soft  Normal appearance  She exhibits no distension  There is no tenderness   There is no CVA tenderness  Genitourinary:   Genitourinary Comments: No suprapubic discomfort or distension    Musculoskeletal: Normal range of motion  Neurological: She is alert and oriented to person, place, and time  Skin: Skin is warm and dry  Psychiatric: She has a normal mood and affect   Her behavior is normal  Judgment and thought content normal        Results    I have personally reviewed all pertinent lab results and reviewed with patient  No results found for: GLUCOSE, CALCIUM, NA, K, CO2, CL, BUN, CREATININE  No results found for: WBC, HGB, HCT, MCV, PLT  Recent Results (from the past 1 hour(s))   POCT urine dip    Collection Time: 11/07/19  9:12 AM   Result Value Ref Range    LEUKOCYTE ESTERASE,UA -     NITRITE,UA -     SL AMB POCT UROBILINOGEN -     POCT URINE PROTEIN -      PH,UA 5 0     BLOOD,UA +++     SPECIFIC GRAVITY,UA 1 005     KETONES,UA -     BILIRUBIN,UA -     GLUCOSE, UA -      COLOR,UA yellow     CLARITY,UA clear

## 2019-11-08 ENCOUNTER — TELEPHONE (OUTPATIENT)
Dept: UROLOGY | Facility: AMBULATORY SURGERY CENTER | Age: 44
End: 2019-11-08

## 2019-11-08 ENCOUNTER — TELEPHONE (OUTPATIENT)
Dept: FAMILY MEDICINE CLINIC | Facility: CLINIC | Age: 44
End: 2019-11-08

## 2019-11-08 DIAGNOSIS — R31.9 URINARY TRACT INFECTION WITH HEMATURIA, SITE UNSPECIFIED: Primary | ICD-10-CM

## 2019-11-08 DIAGNOSIS — N39.0 URINARY TRACT INFECTION WITH HEMATURIA, SITE UNSPECIFIED: Primary | ICD-10-CM

## 2019-11-08 LAB
APPEARANCE UR: CLEAR
BACTERIA URNS QL MICRO: ABNORMAL
BILIRUB UR QL STRIP: NEGATIVE
COLOR UR: YELLOW
EPI CELLS #/AREA URNS HPF: ABNORMAL /HPF (ref 0–10)
GLUCOSE UR QL: NEGATIVE
HGB UR QL STRIP: ABNORMAL
KETONES UR QL STRIP: NEGATIVE
LEUKOCYTE ESTERASE UR QL STRIP: ABNORMAL
MICRO URNS: ABNORMAL
MUCOUS THREADS URNS QL MICRO: PRESENT
NITRITE UR QL STRIP: NEGATIVE
PH UR STRIP: 6 [PH] (ref 5–7.5)
PROT UR QL STRIP: NEGATIVE
RBC #/AREA URNS HPF: ABNORMAL /HPF (ref 0–2)
SP GR UR: 1.02 (ref 1–1.03)
UROBILINOGEN UR STRIP-ACNC: 0.2 MG/DL (ref 0.2–1)
WBC #/AREA URNS HPF: ABNORMAL /HPF (ref 0–5)

## 2019-11-08 RX ORDER — CEPHALEXIN 500 MG/1
500 CAPSULE ORAL 3 TIMES DAILY
Qty: 21 CAPSULE | Refills: 0 | Status: SHIPPED | OUTPATIENT
Start: 2019-11-08 | End: 2019-11-15

## 2019-11-09 LAB
BACTERIA UR CULT: NORMAL
Lab: NO GROWTH

## 2019-11-18 ENCOUNTER — TELEPHONE (OUTPATIENT)
Dept: UROLOGY | Facility: AMBULATORY SURGERY CENTER | Age: 44
End: 2019-11-18

## 2019-11-18 NOTE — TELEPHONE ENCOUNTER
Patient managed in the [place] office  Patient canceled Cysto appointment        Message     Appointment canceled for Mike Spine ([id number])   Visit Type: PROCEDURE LONG [facility]   Date        Time      Length    Provider                  Department   12/19/2019   2:30 PM  30 mins  Giorgio Valdes MD         [facility] [place]      Reason for Cancellation: Canceled via [address]

## 2019-11-27 DIAGNOSIS — R51.9 NONINTRACTABLE HEADACHE, UNSPECIFIED CHRONICITY PATTERN, UNSPECIFIED HEADACHE TYPE: ICD-10-CM

## 2019-11-29 DIAGNOSIS — R51.9 NONINTRACTABLE HEADACHE, UNSPECIFIED CHRONICITY PATTERN, UNSPECIFIED HEADACHE TYPE: ICD-10-CM

## 2019-11-29 RX ORDER — TRAMADOL HYDROCHLORIDE 50 MG/1
50 TABLET ORAL 2 TIMES DAILY PRN
Qty: 30 TABLET | Refills: 0 | OUTPATIENT
Start: 2019-11-29

## 2019-11-29 RX ORDER — TRAMADOL HYDROCHLORIDE 50 MG/1
TABLET ORAL
Qty: 30 TABLET | Refills: 0 | OUTPATIENT
Start: 2019-11-29

## 2019-11-29 RX ORDER — TRAMADOL HYDROCHLORIDE 50 MG/1
50 TABLET ORAL 2 TIMES DAILY PRN
Qty: 30 TABLET | Refills: 0 | Status: SHIPPED | OUTPATIENT
Start: 2019-11-29 | End: 2020-01-23 | Stop reason: SDUPTHER

## 2019-12-26 ENCOUNTER — TELEPHONE (OUTPATIENT)
Dept: FAMILY MEDICINE CLINIC | Facility: CLINIC | Age: 44
End: 2019-12-26

## 2019-12-26 NOTE — TELEPHONE ENCOUNTER
----- Message from Dorina Chicas sent at 12/26/2019  9:44 AM EST -----  Regarding: TCM call  Left message to schedule follow up

## 2019-12-28 DIAGNOSIS — F41.8 DEPRESSION WITH ANXIETY: ICD-10-CM

## 2019-12-28 RX ORDER — VENLAFAXINE HYDROCHLORIDE 150 MG/1
CAPSULE, EXTENDED RELEASE ORAL
Qty: 90 CAPSULE | Refills: 0 | Status: SHIPPED | OUTPATIENT
Start: 2019-12-28 | End: 2020-03-27

## 2019-12-31 DIAGNOSIS — R51.9 NONINTRACTABLE HEADACHE, UNSPECIFIED CHRONICITY PATTERN, UNSPECIFIED HEADACHE TYPE: ICD-10-CM

## 2019-12-31 RX ORDER — TRAMADOL HYDROCHLORIDE 50 MG/1
50 TABLET ORAL 2 TIMES DAILY PRN
Qty: 30 TABLET | Refills: 0 | OUTPATIENT
Start: 2019-12-31

## 2019-12-31 RX ORDER — TRAMADOL HYDROCHLORIDE 50 MG/1
TABLET ORAL
Qty: 30 TABLET | Refills: 0 | OUTPATIENT
Start: 2019-12-31

## 2020-01-23 DIAGNOSIS — R51.9 NONINTRACTABLE HEADACHE, UNSPECIFIED CHRONICITY PATTERN, UNSPECIFIED HEADACHE TYPE: ICD-10-CM

## 2020-01-23 RX ORDER — TRAMADOL HYDROCHLORIDE 50 MG/1
50 TABLET ORAL 2 TIMES DAILY PRN
Qty: 30 TABLET | Refills: 0 | Status: SHIPPED | OUTPATIENT
Start: 2020-01-23 | End: 2020-02-14 | Stop reason: SDUPTHER

## 2020-02-14 ENCOUNTER — TELEPHONE (OUTPATIENT)
Dept: FAMILY MEDICINE CLINIC | Facility: CLINIC | Age: 45
End: 2020-02-14

## 2020-02-14 DIAGNOSIS — R51.9 NONINTRACTABLE HEADACHE, UNSPECIFIED CHRONICITY PATTERN, UNSPECIFIED HEADACHE TYPE: ICD-10-CM

## 2020-02-14 RX ORDER — TRAMADOL HYDROCHLORIDE 50 MG/1
50 TABLET ORAL 2 TIMES DAILY PRN
Qty: 30 TABLET | Refills: 0 | Status: SHIPPED | OUTPATIENT
Start: 2020-02-14 | End: 2020-03-05 | Stop reason: SDUPTHER

## 2020-02-14 NOTE — TELEPHONE ENCOUNTER
Please contact patient  I just refilled her tramadol  I have not seen her in the office since January of last year  She needs to schedule annual checkup  I would not be able to continue refilling her medications including tramadol without office visit    Thank you

## 2020-03-05 ENCOUNTER — OFFICE VISIT (OUTPATIENT)
Dept: FAMILY MEDICINE CLINIC | Facility: CLINIC | Age: 45
End: 2020-03-05
Payer: COMMERCIAL

## 2020-03-05 VITALS
WEIGHT: 222.8 LBS | DIASTOLIC BLOOD PRESSURE: 80 MMHG | TEMPERATURE: 98.8 F | OXYGEN SATURATION: 97 % | BODY MASS INDEX: 38.04 KG/M2 | RESPIRATION RATE: 15 BRPM | SYSTOLIC BLOOD PRESSURE: 118 MMHG | HEART RATE: 90 BPM | HEIGHT: 64 IN

## 2020-03-05 DIAGNOSIS — Z72.0 TOBACCO USE: Primary | ICD-10-CM

## 2020-03-05 DIAGNOSIS — Z13.220 NEED FOR LIPID SCREENING: ICD-10-CM

## 2020-03-05 DIAGNOSIS — K21.9 GASTROESOPHAGEAL REFLUX DISEASE, ESOPHAGITIS PRESENCE NOT SPECIFIED: ICD-10-CM

## 2020-03-05 DIAGNOSIS — R51.9 NONINTRACTABLE HEADACHE, UNSPECIFIED CHRONICITY PATTERN, UNSPECIFIED HEADACHE TYPE: ICD-10-CM

## 2020-03-05 DIAGNOSIS — F41.8 DEPRESSION WITH ANXIETY: ICD-10-CM

## 2020-03-05 DIAGNOSIS — J32.9 SINUSITIS, UNSPECIFIED CHRONICITY, UNSPECIFIED LOCATION: ICD-10-CM

## 2020-03-05 DIAGNOSIS — G43.709 CHRONIC MIGRAINE WITHOUT AURA WITHOUT STATUS MIGRAINOSUS, NOT INTRACTABLE: ICD-10-CM

## 2020-03-05 DIAGNOSIS — Z00.00 WELL ADULT EXAM: ICD-10-CM

## 2020-03-05 DIAGNOSIS — E66.9 OBESITY (BMI 30-39.9): ICD-10-CM

## 2020-03-05 PROCEDURE — 99396 PREV VISIT EST AGE 40-64: CPT | Performed by: FAMILY MEDICINE

## 2020-03-05 PROCEDURE — 3008F BODY MASS INDEX DOCD: CPT | Performed by: FAMILY MEDICINE

## 2020-03-05 RX ORDER — VARENICLINE TARTRATE 1 MG/1
1 TABLET, FILM COATED ORAL 2 TIMES DAILY
Qty: 60 TABLET | Refills: 4 | Status: SHIPPED | OUTPATIENT
Start: 2020-03-05 | End: 2021-03-17

## 2020-03-05 RX ORDER — RIZATRIPTAN BENZOATE 10 MG/1
TABLET, ORALLY DISINTEGRATING ORAL
Qty: 9 TABLET | Refills: 2 | Status: SHIPPED | OUTPATIENT
Start: 2020-03-05 | End: 2021-03-19 | Stop reason: SDUPTHER

## 2020-03-05 RX ORDER — TRAMADOL HYDROCHLORIDE 50 MG/1
50 TABLET ORAL 2 TIMES DAILY PRN
Qty: 30 TABLET | Refills: 0 | Status: CANCELLED | OUTPATIENT
Start: 2020-03-05

## 2020-03-05 RX ORDER — VARENICLINE TARTRATE 25 MG
KIT ORAL
Qty: 53 TABLET | Refills: 0 | Status: SHIPPED | OUTPATIENT
Start: 2020-03-05 | End: 2021-03-17

## 2020-03-05 RX ORDER — TRAMADOL HYDROCHLORIDE 50 MG/1
50 TABLET ORAL 2 TIMES DAILY PRN
Qty: 30 TABLET | Refills: 0 | Status: SHIPPED | OUTPATIENT
Start: 2020-03-05 | End: 2020-03-19 | Stop reason: SDUPTHER

## 2020-03-05 RX ORDER — DOXYCYCLINE HYCLATE 100 MG/1
100 CAPSULE ORAL
Qty: 20 CAPSULE | Refills: 0 | Status: SHIPPED | OUTPATIENT
Start: 2020-03-05 | End: 2020-03-15

## 2020-03-05 NOTE — PATIENT INSTRUCTIONS
· Doxycycline twice a day after food for 10 days  ·  Please use albuterol via nebulizer at least twice a day for the next 5-7 days  · Delsym for cough

## 2020-03-05 NOTE — PROGRESS NOTES
FAMILY PRACTICE OFFICE VISIT       NAME: Jad Couch  AGE: 38 y o  SEX: female       : 1975        MRN: [id number]        Assessment and Plan     Problem List Items Addressed This Visit        Digestive    Esophageal reflux     · Patient uses Protonix p r n  Cardiovascular and Mediastinum    Headache, migraine     · Continue Effexor  mg daily  · Patient's abortive regimen should include Maxalt and 600 mg of ibuprofen followed with p r n  tramadol if needed         Relevant Medications    rizatriptan (MAXALT-MLT) 10 MG disintegrating tablet    traMADol (ULTRAM) 50 mg tablet    Other Relevant Orders    CBC and differential    Comprehensive metabolic panel    TSH, 3rd generation       Other    Depression with anxiety     · Symptoms are well controlled on Effexor  mg daily         Relevant Medications    varenicline (Chantix Starting Month ) 0 5 MG X 11 & 1 MG X 42 tablet    varenicline (CHANTIX) 1 mg tablet    Obesity (BMI 30-39  9)      Other Visit Diagnoses     Tobacco use    -  Primary    Relevant Medications    varenicline (Chantix Starting Month ) 0 5 MG X 11 & 1 MG X 42 tablet    varenicline (CHANTIX) 1 mg tablet    Well adult exam        Need for lipid screening        Relevant Orders    Lipid Panel with Direct LDL reflex    Nonintractable headache, unspecified chronicity pattern, unspecified headache type        Relevant Medications    traMADol (ULTRAM) 50 mg tablet    Sinusitis, unspecified chronicity, unspecified location        Relevant Medications    doxycycline hyclate (VIBRAMYCIN) 100 mg capsule      Patient presents for annual well exam   Recent hospitalization or treatment for abscess pubis with 6 weeks of PICC line IV antibiotics  Patient remains under care of gyn at [facility]   She denies symptoms of pain or dysuria  She has pending routine checkup with gyn shortly    Patient is up-to-date with mammogram   She has been experiencing migraines are usually bothersome 1 week prior to start of menses  Patient has been using tramadol p r n  and remains on Effexor  mg daily for treatment of depression and anxiety  Patient is unfortunately smoking and would like to quit  Patient has been experiencing symptoms of cold and cough x2 weeks, no fever, chest tightness or wheezing  Plan:  · Start doxycycline for symptoms of URI, patient will use albuterol via nebulizer twice a day for the next few days and will contact me if her symptoms are not improving, patient will use Delsym over-the-counter for cough symptoms  · Trial of Maxalt for treatment of migraines, advised patient to minimize use of tramadol and use Maxalt along with ibuprofen as 1st line of abortive therapy  · Routine blood work orders as outlined above  · Chantix for smoking cessation  · Weight loss, exercise, healthy lifestyle discussed  BMI Counseling: Body mass index is 38 24 kg/m²  The BMI is above normal  Nutrition recommendations include decreasing portion sizes, encouraging healthy choices of fruits and vegetables, consuming healthier snacks, moderation in carbohydrate intake and reducing intake of cholesterol  Exercise recommendations include exercising 3-5 times per week  Tobacco Cessation Counseling: Tobacco cessation counseling was provided  The patient is sincerely urged to quit consumption of tobacco  She is ready to quit tobacco  Medication options and side effects of medication discussed  Patient agreed to medication  Varenicline (chantix) was prescribed  Patient Instructions   · Doxycycline twice a day after food for 10 days  ·  Please use albuterol via nebulizer at least twice a day for the next 5-7 days  · Delsym for cough      Discussed with the patient and all questioned fully answered  She will call me if any problems arise  M*Modal software was used to dictate this note  It may contain errors with dictating incorrect words/spelling   Please contact provider directly with any questions  Chief Complaint     Chief Complaint   Patient presents with    Annual Exam       History of Present Illness     Patient presents for annual well exam   She was recently diagnosed and treated for abscess pubis by [facility]    Patient has completed 6 weeks of IV pICC line Abx     Picc line was d/lana 1/30/2020   GYN  -  Annual visit is pending with [facility]  Patient is up-to-date with mammogram, [facility]-  UTD 9/2019     Daily medications include Effexor  mg daily for symptoms of depression anxiety  Patient is still experiencing recurrent migraines that usually occur 1 week prior to menses  She uses tramadol up to 100 mg b i d  on those days  Patient is not using tripped on at present time  Patient is smoking and would like to quit  We discussed medication options and will try Chantix  We discussed mechanism of action and side effects  Patient is experiencing symptoms of cold, nasal congestion and cough at night for 2 weeks  She denies symptoms of fever  She is complaining of postnasal drip  Patient is coughing at night  Review of Systems   Review of Systems   Constitutional: Negative  HENT: Positive for congestion, postnasal drip and sore throat  Eyes: Negative  Respiratory: Positive for cough  Negative for choking and shortness of breath  Cardiovascular: Negative  Gastrointestinal: Negative  Endocrine: Negative  Genitourinary: Negative  Musculoskeletal: Negative  Allergic/Immunologic: Negative  Neurological: Negative  Hematological: Negative  Psychiatric/Behavioral: Negative  Active Problem List     Patient Active Problem List   Diagnosis    Depression with anxiety    Allergic rhinitis    Esophageal reflux    Headache, migraine    Obesity (BMI 30-39  9)    Vitamin D deficiency       Past Medical History:  No past medical history on file      Past Surgical History:  Past Surgical History: Procedure Laterality Date    NO PAST SURGERIES         Family History:  Family History   Problem Relation Age of Onset    Diabetes Mother     Cerebral aneurysm Mother     Hypertension Mother     Heart attack Father 58    Hypertension Brother     Breast cancer Maternal Aunt        Social History:  Social History     Socioeconomic History    Marital status: /Civil Union     Spouse name: Not on file    Number of children: Not on file    Years of education: Not on file    Highest education level: Not on file   Occupational History    Not on file   Social Needs    Financial resource strain: Not on file    Food insecurity:     Worry: Not on file     Inability: Not on file    Transportation needs:     Medical: Not on file     Non-medical: Not on file   Tobacco Use    Smoking status: Current Every Day Smoker    Smokeless tobacco: Never Used   Substance and Sexual Activity    Alcohol use: Yes     Comment: social    Drug use: No    Sexual activity: Not on file   Lifestyle    Physical activity:     Days per week: Not on file     Minutes per session: Not on file    Stress: Not on file   Relationships    Social connections:     Talks on phone: Not on file     Gets together: Not on file     Attends Anabaptism service: Not on file     Active member of club or organization: Not on file     Attends meetings of clubs or organizations: Not on file     Relationship status: Not on file    Intimate partner violence:     Fear of current or ex partner: Not on file     Emotionally abused: Not on file     Physically abused: Not on file     Forced sexual activity: Not on file   Other Topics Concern    Not on file   Social History Narrative    Not on file           Objective     Vitals:    03/05/20 1515 03/05/20 1554   BP: 120/90 118/80   BP Location: Left arm    Patient Position: Sitting    Cuff Size: Standard    Pulse: 90    Resp: 15    Temp: 98 8 °F (37 1 °C)    TempSrc: Tympanic    SpO2: 97%    Weight: 101 kg (222 lb 12 8 oz)    Height: 5' 4" (1 626 m)      Wt Readings from Last 3 Encounters:   03/05/20 101 kg (222 lb 12 8 oz)   11/07/19 103 kg (226 lb)   10/14/19 103 kg (227 lb)       Physical Exam   Constitutional: She is oriented to person, place, and time  She appears well-developed and well-nourished  HENT:   Head: Normocephalic and atraumatic  Oropharynx:  Erythema, postnasal drip, no exudate   Eyes: Conjunctivae are normal    Neck: Neck supple  No JVD present  Carotid bruit is not present  No thyromegaly present  Cardiovascular: Normal rate, regular rhythm and normal heart sounds  No murmur heard  Pulmonary/Chest: Effort normal  No respiratory distress  She has no wheezes  She has no rales  Increased expiratory phase, few coarse sounds at bases, no rhonchi   Abdominal: Soft  Bowel sounds are normal  She exhibits no distension and no abdominal bruit  There is no tenderness  There is no guarding  Musculoskeletal: Normal range of motion  She exhibits no edema  Neurological: She is alert and oriented to person, place, and time  No cranial nerve deficit  Coordination normal    Psychiatric: She has a normal mood and affect  Her behavior is normal    Nursing note and vitals reviewed        Pertinent Laboratory/Diagnostic Studies:  No results found for: GLUCOSE, BUN, CREATININE, CALCIUM, NA, K, CO2, CL  No results found for: ALT, AST, GGT, ALKPHOS, BILITOT    No results found for: WBC, HGB, HCT, MCV, PLT    No results found for: TSH    No results found for: CHOL  No results found for: TRIG  No results found for: HDL  No results found for: LDLCALC  No results found for: HGBA1C    Results for orders placed or performed in visit on 11/07/19   Urine culture   Result Value Ref Range    Urine Culture Result Final report    Urinalysis with microscopic   Result Value Ref Range    Specific Gravity 1 023 1 005 - 1 030    Ph 6 0 5 0 - 7 5    Color UA Yellow Yellow    Urine Appearance Clear Clear    Leukocyte Esterase Trace (A) Negative    Protein Negative Negative/Trace    Glucose, 24 HR Urine Negative Negative    Ketone, Urine Negative Negative    Blood, Urine 2+ (A) Negative    Bilirubin, Urine Negative Negative    Urobilinogen Urine 0 2 0 2 - 1 0 mg/dL    SL AMB NITRITES URINE, QUAL  Negative Negative    Microscopic Examination See below:    Microscopic Examination   Result Value Ref Range    SL AMB WBC, URINE 0-5 0 - 5 /hpf    RBC, Urine 3-10 (A) 0 - 2 /hpf    Epithelial Cells (non renal) 0-10 0 - 10 /hpf    MUCUS THREADS Present Not Estab      Bacteria, Urine Few None seen/Few   Result   Result Value Ref Range    Result 1 No growth    POCT urine dip   Result Value Ref Range    LEUKOCYTE ESTERASE,UA -     NITRITE,UA -     SL AMB POCT UROBILINOGEN -     POCT URINE PROTEIN -      PH,UA 5 0     BLOOD,UA +++     SPECIFIC GRAVITY,UA 1 005     KETONES,UA -     BILIRUBIN,UA -     GLUCOSE, UA -      COLOR,UA yellow     CLARITY,UA clear        Orders Placed This Encounter   Procedures    CBC and differential    Comprehensive metabolic panel    Lipid Panel with Direct LDL reflex    TSH, 3rd generation       ALLERGIES:  Allergies   Allergen Reactions    Clarithromycin Other (See Comments)     nausea    Sulfa Antibiotics GI Intolerance    Sulfamethoxazole-Trimethoprim Other (See Comments)     rash       Current Medications     Current Outpatient Medications   Medication Sig Dispense Refill    pantoprazole (PROTONIX) 40 mg tablet TAKE 1 TABLET DAILY 90 tablet 1    traMADol (ULTRAM) 50 mg tablet Take 1 tablet (50 mg total) by mouth 2 (two) times a day as needed for moderate pain (headache) 30 tablet 0    valACYclovir (VALTREX) 500 mg tablet Take 500 mg by mouth      venlafaxine (EFFEXOR-XR) 150 mg 24 hr capsule TAKE 1 CAPSULE DAILY 90 capsule 0    doxycycline hyclate (VIBRAMYCIN) 100 mg capsule Take 1 capsule (100 mg total) by mouth 2 (two) times daily after meals for 10 days 20 capsule 0    rizatriptan (MAXALT-MLT) 10 MG disintegrating tablet Take 1 tablet with the onset of migraine, okay to repeat dose in 2 hours, max dose 2 tablets in 24 hours 9 tablet 2    varenicline (Chantix Starting Month Yang) 0 5 MG X 11 & 1 MG X 42 tablet Take one 0 5 mg tablet by mouth once daily for 3 days, then one 0 5 mg tablet by mouth twice daily for 4 days, then one 1 mg tablet by mouth twice daily  53 tablet 0    varenicline (CHANTIX) 1 mg tablet Take 1 tablet (1 mg total) by mouth 2 (two) times a day 60 tablet 4     No current facility-administered medications for this visit          Medications Discontinued During This Encounter   Medication Reason    naproxen (NAPROSYN) 500 mg tablet Therapy completed    traMADol (ULTRAM) 50 mg tablet Reorder       Health Maintenance     Health Maintenance   Topic Date Due    Pneumococcal Vaccine: Pediatrics (0 to 5 Years) and At-Risk Patients (6 to 59 Years) (1 of 1 - PPSV23) 04/22/1982    DTaP,Tdap,and Td Vaccines (1 - Tdap) 04/22/1987    Annual Physical  01/17/2020    BMI: Followup Plan  01/20/2020    BMI: Adult  03/05/2021    MAMMOGRAM  09/19/2021    Cervical Cancer Screening  02/22/2023    Pneumococcal Vaccine: 65+ Years (1 of 2 - PCV13) 04/22/2041    HIV Screening  Completed    Influenza Vaccine  Completed    HIB Vaccine  Aged Out    Hepatitis B Vaccine  Aged Out    IPV Vaccine  Aged Out    Hepatitis A Vaccine  Aged Out    Meningococcal ACWY Vaccine  Aged Out    HPV Vaccine  Aged Out       Immunization History   Administered Date(s) Administered    INFLUENZA 10/16/2015, 10/31/2018, 10/05/2019    Influenza TIV (IM) 10/06/2016       Jony Alonso MD

## 2020-03-10 NOTE — ASSESSMENT & PLAN NOTE
· Continue Effexor  mg daily    · Patient's abortive regimen should include Maxalt and 600 mg of ibuprofen followed with p r n  tramadol if needed

## 2020-03-19 DIAGNOSIS — R51.9 NONINTRACTABLE HEADACHE, UNSPECIFIED CHRONICITY PATTERN, UNSPECIFIED HEADACHE TYPE: ICD-10-CM

## 2020-03-19 DIAGNOSIS — F41.8 DEPRESSION WITH ANXIETY: Primary | ICD-10-CM

## 2020-03-19 RX ORDER — LORAZEPAM 0.5 MG/1
0.5 TABLET ORAL EVERY 8 HOURS PRN
Qty: 30 TABLET | Refills: 0 | Status: SHIPPED | OUTPATIENT
Start: 2020-03-19 | End: 2020-04-09 | Stop reason: SDUPTHER

## 2020-03-19 RX ORDER — TRAMADOL HYDROCHLORIDE 50 MG/1
50 TABLET ORAL 2 TIMES DAILY PRN
Qty: 30 TABLET | Refills: 0 | Status: SHIPPED | OUTPATIENT
Start: 2020-03-19 | End: 2020-04-09 | Stop reason: SDUPTHER

## 2020-03-27 DIAGNOSIS — F41.8 DEPRESSION WITH ANXIETY: ICD-10-CM

## 2020-03-27 RX ORDER — VENLAFAXINE HYDROCHLORIDE 150 MG/1
CAPSULE, EXTENDED RELEASE ORAL
Qty: 90 CAPSULE | Refills: 3 | Status: SHIPPED | OUTPATIENT
Start: 2020-03-27 | End: 2020-03-30 | Stop reason: SDUPTHER

## 2020-03-30 DIAGNOSIS — F41.8 DEPRESSION WITH ANXIETY: ICD-10-CM

## 2020-03-30 RX ORDER — VENLAFAXINE HYDROCHLORIDE 150 MG/1
150 CAPSULE, EXTENDED RELEASE ORAL DAILY
Qty: 90 CAPSULE | Refills: 3 | Status: SHIPPED | OUTPATIENT
Start: 2020-03-30 | End: 2020-06-22 | Stop reason: SDUPTHER

## 2020-03-30 NOTE — TELEPHONE ENCOUNTER
----- Message from Rosa Stewart sent at 3/30/2020  6:54 AM EDT -----  Regarding: Prescription Question  Contact: [phone]  Hi Dr Alexx [facility] will not refill my Effexor because they are waiting to hear back from the office  Also, could you please send a script to them for Rowdy's Effexor, same dose  He forgot to talk to you about it       Thank you  Stay Healthy!!  Ebbie Needle

## 2020-04-09 DIAGNOSIS — F41.8 DEPRESSION WITH ANXIETY: ICD-10-CM

## 2020-04-09 DIAGNOSIS — R51.9 NONINTRACTABLE HEADACHE, UNSPECIFIED CHRONICITY PATTERN, UNSPECIFIED HEADACHE TYPE: ICD-10-CM

## 2020-04-09 DIAGNOSIS — K21.9 CHRONIC GERD: ICD-10-CM

## 2020-04-11 RX ORDER — PANTOPRAZOLE SODIUM 40 MG/1
40 TABLET, DELAYED RELEASE ORAL DAILY
Qty: 90 TABLET | Refills: 1 | Status: SHIPPED | OUTPATIENT
Start: 2020-04-11 | End: 2020-04-24

## 2020-04-11 RX ORDER — TRAMADOL HYDROCHLORIDE 50 MG/1
50 TABLET ORAL DAILY PRN
Qty: 30 TABLET | Refills: 1 | Status: SHIPPED | OUTPATIENT
Start: 2020-04-11 | End: 2020-06-05

## 2020-04-11 RX ORDER — LORAZEPAM 0.5 MG/1
0.5 TABLET ORAL DAILY PRN
Qty: 30 TABLET | Refills: 1 | Status: SHIPPED | OUTPATIENT
Start: 2020-04-11 | End: 2020-08-28

## 2020-04-22 ENCOUNTER — TELEPHONE (OUTPATIENT)
Dept: FAMILY MEDICINE CLINIC | Facility: CLINIC | Age: 45
End: 2020-04-22

## 2020-04-23 DIAGNOSIS — K21.9 CHRONIC GERD: ICD-10-CM

## 2020-04-24 RX ORDER — PANTOPRAZOLE SODIUM 40 MG/1
TABLET, DELAYED RELEASE ORAL
Qty: 90 TABLET | Refills: 3 | Status: SHIPPED | OUTPATIENT
Start: 2020-04-24 | End: 2022-06-21 | Stop reason: SDUPTHER

## 2020-06-04 DIAGNOSIS — R51.9 NONINTRACTABLE HEADACHE, UNSPECIFIED CHRONICITY PATTERN, UNSPECIFIED HEADACHE TYPE: ICD-10-CM

## 2020-06-05 RX ORDER — TRAMADOL HYDROCHLORIDE 50 MG/1
TABLET ORAL
Qty: 30 TABLET | Refills: 1 | Status: SHIPPED | OUTPATIENT
Start: 2020-06-05 | End: 2020-07-31

## 2020-06-19 ENCOUNTER — TELEPHONE (OUTPATIENT)
Dept: FAMILY MEDICINE CLINIC | Facility: CLINIC | Age: 45
End: 2020-06-19

## 2020-06-22 DIAGNOSIS — F41.8 DEPRESSION WITH ANXIETY: ICD-10-CM

## 2020-06-23 RX ORDER — VENLAFAXINE HYDROCHLORIDE 150 MG/1
150 CAPSULE, EXTENDED RELEASE ORAL DAILY
Qty: 90 CAPSULE | Refills: 3 | Status: SHIPPED | OUTPATIENT
Start: 2020-06-23 | End: 2021-06-12

## 2020-07-31 DIAGNOSIS — R51.9 NONINTRACTABLE HEADACHE, UNSPECIFIED CHRONICITY PATTERN, UNSPECIFIED HEADACHE TYPE: ICD-10-CM

## 2020-07-31 RX ORDER — TRAMADOL HYDROCHLORIDE 50 MG/1
TABLET ORAL
Qty: 30 TABLET | Refills: 0 | OUTPATIENT
Start: 2020-07-31

## 2020-07-31 RX ORDER — TRAMADOL HYDROCHLORIDE 50 MG/1
TABLET ORAL
Qty: 30 TABLET | Refills: 0 | Status: SHIPPED | OUTPATIENT
Start: 2020-07-31 | End: 2020-08-28

## 2020-08-28 DIAGNOSIS — F41.8 DEPRESSION WITH ANXIETY: ICD-10-CM

## 2020-08-28 DIAGNOSIS — R51.9 NONINTRACTABLE HEADACHE, UNSPECIFIED CHRONICITY PATTERN, UNSPECIFIED HEADACHE TYPE: ICD-10-CM

## 2020-08-28 RX ORDER — LORAZEPAM 0.5 MG/1
0.5 TABLET ORAL DAILY PRN
Qty: 30 TABLET | Refills: 0 | OUTPATIENT
Start: 2020-08-28

## 2020-08-28 RX ORDER — LORAZEPAM 0.5 MG/1
TABLET ORAL
Qty: 30 TABLET | Refills: 1 | Status: SHIPPED | OUTPATIENT
Start: 2020-08-28 | End: 2021-01-25 | Stop reason: SDUPTHER

## 2020-08-28 RX ORDER — TRAMADOL HYDROCHLORIDE 50 MG/1
TABLET ORAL
Qty: 30 TABLET | Refills: 1 | Status: SHIPPED | OUTPATIENT
Start: 2020-08-28 | End: 2020-09-20

## 2020-08-28 RX ORDER — TRAMADOL HYDROCHLORIDE 50 MG/1
TABLET ORAL
Qty: 30 TABLET | Refills: 0 | OUTPATIENT
Start: 2020-08-28

## 2020-09-17 DIAGNOSIS — R51.9 NONINTRACTABLE HEADACHE, UNSPECIFIED CHRONICITY PATTERN, UNSPECIFIED HEADACHE TYPE: ICD-10-CM

## 2020-09-20 RX ORDER — TRAMADOL HYDROCHLORIDE 50 MG/1
TABLET ORAL
Qty: 30 TABLET | Refills: 1 | Status: SHIPPED | OUTPATIENT
Start: 2020-09-20 | End: 2020-11-11 | Stop reason: SDUPTHER

## 2020-10-26 ENCOUNTER — TELEPHONE (OUTPATIENT)
Dept: FAMILY MEDICINE CLINIC | Facility: CLINIC | Age: 45
End: 2020-10-26

## 2020-11-10 DIAGNOSIS — R51.9 NONINTRACTABLE HEADACHE, UNSPECIFIED CHRONICITY PATTERN, UNSPECIFIED HEADACHE TYPE: ICD-10-CM

## 2020-11-11 DIAGNOSIS — R51.9 NONINTRACTABLE HEADACHE, UNSPECIFIED CHRONICITY PATTERN, UNSPECIFIED HEADACHE TYPE: ICD-10-CM

## 2020-11-11 RX ORDER — TRAMADOL HYDROCHLORIDE 50 MG/1
TABLET ORAL
Qty: 30 TABLET | Refills: 0 | OUTPATIENT
Start: 2020-11-11

## 2020-11-12 RX ORDER — TRAMADOL HYDROCHLORIDE 50 MG/1
TABLET ORAL
Qty: 30 TABLET | Refills: 0 | Status: SHIPPED | OUTPATIENT
Start: 2020-11-12 | End: 2020-12-01

## 2020-11-30 DIAGNOSIS — R51.9 NONINTRACTABLE HEADACHE, UNSPECIFIED CHRONICITY PATTERN, UNSPECIFIED HEADACHE TYPE: ICD-10-CM

## 2020-12-01 ENCOUNTER — TELEPHONE (OUTPATIENT)
Dept: FAMILY MEDICINE CLINIC | Facility: CLINIC | Age: 45
End: 2020-12-01

## 2020-12-01 DIAGNOSIS — R51.9 NONINTRACTABLE HEADACHE, UNSPECIFIED CHRONICITY PATTERN, UNSPECIFIED HEADACHE TYPE: ICD-10-CM

## 2020-12-01 RX ORDER — TRAMADOL HYDROCHLORIDE 50 MG/1
TABLET ORAL
Qty: 30 TABLET | Refills: 0 | OUTPATIENT
Start: 2020-12-01

## 2020-12-01 RX ORDER — TRAMADOL HYDROCHLORIDE 50 MG/1
50 TABLET ORAL 2 TIMES DAILY PRN
Qty: 30 TABLET | Refills: 0 | Status: SHIPPED | OUTPATIENT
Start: 2020-12-01 | End: 2020-12-16 | Stop reason: SDUPTHER

## 2020-12-15 DIAGNOSIS — R51.9 NONINTRACTABLE HEADACHE, UNSPECIFIED CHRONICITY PATTERN, UNSPECIFIED HEADACHE TYPE: ICD-10-CM

## 2020-12-16 DIAGNOSIS — R51.9 NONINTRACTABLE HEADACHE, UNSPECIFIED CHRONICITY PATTERN, UNSPECIFIED HEADACHE TYPE: ICD-10-CM

## 2020-12-16 RX ORDER — TRAMADOL HYDROCHLORIDE 50 MG/1
50 TABLET ORAL 2 TIMES DAILY PRN
Qty: 30 TABLET | Refills: 0 | Status: SHIPPED | OUTPATIENT
Start: 2020-12-16 | End: 2020-12-31

## 2020-12-16 RX ORDER — TRAMADOL HYDROCHLORIDE 50 MG/1
50 TABLET ORAL 2 TIMES DAILY PRN
Qty: 30 TABLET | Refills: 0 | OUTPATIENT
Start: 2020-12-16

## 2020-12-16 RX ORDER — NALOXONE HYDROCHLORIDE 4 MG/.1ML
SPRAY NASAL
Qty: 1 EACH | Refills: 1 | Status: SHIPPED | OUTPATIENT
Start: 2020-12-16 | End: 2021-03-17

## 2020-12-30 DIAGNOSIS — R51.9 NONINTRACTABLE HEADACHE, UNSPECIFIED CHRONICITY PATTERN, UNSPECIFIED HEADACHE TYPE: ICD-10-CM

## 2020-12-31 RX ORDER — TRAMADOL HYDROCHLORIDE 50 MG/1
TABLET ORAL
Qty: 10 TABLET | Refills: 0 | Status: SHIPPED | OUTPATIENT
Start: 2020-12-31 | End: 2021-01-07 | Stop reason: SDUPTHER

## 2021-01-07 DIAGNOSIS — R51.9 NONINTRACTABLE HEADACHE, UNSPECIFIED CHRONICITY PATTERN, UNSPECIFIED HEADACHE TYPE: ICD-10-CM

## 2021-01-08 RX ORDER — TRAMADOL HYDROCHLORIDE 50 MG/1
50 TABLET ORAL 2 TIMES DAILY PRN
Qty: 30 TABLET | Refills: 0 | Status: SHIPPED | OUTPATIENT
Start: 2021-01-08 | End: 2021-01-25 | Stop reason: SDUPTHER

## 2021-01-08 RX ORDER — TRAMADOL HYDROCHLORIDE 50 MG/1
TABLET ORAL
Qty: 30 TABLET | OUTPATIENT
Start: 2021-01-08

## 2021-01-10 ENCOUNTER — IMMUNIZATIONS (OUTPATIENT)
Dept: FAMILY MEDICINE CLINIC | Facility: HOSPITAL | Age: 46
End: 2021-01-10

## 2021-01-10 DIAGNOSIS — Z23 ENCOUNTER FOR IMMUNIZATION: ICD-10-CM

## 2021-01-10 PROCEDURE — 0001A SARS-COV-2 / COVID-19 MRNA VACCINE (PFIZER-BIONTECH) 30 MCG: CPT

## 2021-01-10 PROCEDURE — 91300 SARS-COV-2 / COVID-19 MRNA VACCINE (PFIZER-BIONTECH) 30 MCG: CPT

## 2021-01-25 DIAGNOSIS — F41.8 DEPRESSION WITH ANXIETY: ICD-10-CM

## 2021-01-25 DIAGNOSIS — R51.9 NONINTRACTABLE HEADACHE, UNSPECIFIED CHRONICITY PATTERN, UNSPECIFIED HEADACHE TYPE: ICD-10-CM

## 2021-01-26 RX ORDER — LORAZEPAM 0.5 MG/1
0.5 TABLET ORAL DAILY PRN
Qty: 30 TABLET | Refills: 0 | Status: SHIPPED | OUTPATIENT
Start: 2021-01-26 | End: 2021-03-11 | Stop reason: SDUPTHER

## 2021-01-26 RX ORDER — TRAMADOL HYDROCHLORIDE 50 MG/1
50 TABLET ORAL 2 TIMES DAILY PRN
Qty: 30 TABLET | Refills: 0 | Status: SHIPPED | OUTPATIENT
Start: 2021-01-26 | End: 2021-02-12

## 2021-01-26 NOTE — TELEPHONE ENCOUNTER
Patient is requesting a refill    Requested Prescriptions     Pending Prescriptions Disp Refills    LORazepam (ATIVAN) 0 5 mg tablet 30 tablet 0    traMADol (ULTRAM) 50 mg tablet 30 tablet 0     Sig: Take 1 tablet (50 mg total) by mouth 2 (two) times a day as needed for moderate pain (headache)       Please review and advise

## 2021-01-29 ENCOUNTER — IMMUNIZATIONS (OUTPATIENT)
Dept: FAMILY MEDICINE CLINIC | Facility: HOSPITAL | Age: 46
End: 2021-01-29

## 2021-01-29 DIAGNOSIS — Z23 ENCOUNTER FOR IMMUNIZATION: Primary | ICD-10-CM

## 2021-01-29 PROCEDURE — 0002A SARS-COV-2 / COVID-19 MRNA VACCINE (PFIZER-BIONTECH) 30 MCG: CPT | Performed by: PEDIATRICS

## 2021-01-29 PROCEDURE — 91300 SARS-COV-2 / COVID-19 MRNA VACCINE (PFIZER-BIONTECH) 30 MCG: CPT | Performed by: PEDIATRICS

## 2021-02-10 DIAGNOSIS — R51.9 NONINTRACTABLE HEADACHE, UNSPECIFIED CHRONICITY PATTERN, UNSPECIFIED HEADACHE TYPE: ICD-10-CM

## 2021-02-12 DIAGNOSIS — R51.9 NONINTRACTABLE HEADACHE, UNSPECIFIED CHRONICITY PATTERN, UNSPECIFIED HEADACHE TYPE: ICD-10-CM

## 2021-02-12 RX ORDER — TRAMADOL HYDROCHLORIDE 50 MG/1
50 TABLET ORAL 2 TIMES DAILY PRN
Qty: 30 TABLET | Refills: 0 | OUTPATIENT
Start: 2021-02-12

## 2021-02-12 RX ORDER — TRAMADOL HYDROCHLORIDE 50 MG/1
TABLET ORAL
Qty: 30 TABLET | Refills: 0 | Status: SHIPPED | OUTPATIENT
Start: 2021-02-12 | End: 2021-02-25 | Stop reason: SDUPTHER

## 2021-02-25 DIAGNOSIS — R51.9 NONINTRACTABLE HEADACHE, UNSPECIFIED CHRONICITY PATTERN, UNSPECIFIED HEADACHE TYPE: ICD-10-CM

## 2021-02-26 DIAGNOSIS — R51.9 NONINTRACTABLE HEADACHE, UNSPECIFIED CHRONICITY PATTERN, UNSPECIFIED HEADACHE TYPE: ICD-10-CM

## 2021-02-26 RX ORDER — TRAMADOL HYDROCHLORIDE 50 MG/1
50 TABLET ORAL 2 TIMES DAILY PRN
Qty: 30 TABLET | Refills: 0 | Status: SHIPPED | OUTPATIENT
Start: 2021-02-26 | End: 2021-03-12 | Stop reason: SDUPTHER

## 2021-02-26 RX ORDER — TRAMADOL HYDROCHLORIDE 50 MG/1
50 TABLET ORAL
Qty: 30 TABLET | Refills: 0 | OUTPATIENT
Start: 2021-02-26

## 2021-03-11 DIAGNOSIS — R51.9 NONINTRACTABLE HEADACHE, UNSPECIFIED CHRONICITY PATTERN, UNSPECIFIED HEADACHE TYPE: Primary | ICD-10-CM

## 2021-03-11 DIAGNOSIS — F41.8 DEPRESSION WITH ANXIETY: ICD-10-CM

## 2021-03-11 RX ORDER — LORAZEPAM 0.5 MG/1
0.5 TABLET ORAL DAILY PRN
Qty: 30 TABLET | Refills: 0 | Status: SHIPPED | OUTPATIENT
Start: 2021-03-11 | End: 2021-08-24

## 2021-03-12 DIAGNOSIS — R51.9 NONINTRACTABLE HEADACHE, UNSPECIFIED CHRONICITY PATTERN, UNSPECIFIED HEADACHE TYPE: ICD-10-CM

## 2021-03-12 RX ORDER — TRAMADOL HYDROCHLORIDE 50 MG/1
50 TABLET ORAL 2 TIMES DAILY PRN
Qty: 30 TABLET | Refills: 0 | Status: SHIPPED | OUTPATIENT
Start: 2021-03-12 | End: 2021-03-19 | Stop reason: SDUPTHER

## 2021-03-12 RX ORDER — TRAMADOL HYDROCHLORIDE 50 MG/1
50 TABLET ORAL 2 TIMES DAILY PRN
Qty: 30 TABLET | Refills: 0 | Status: CANCELLED | OUTPATIENT
Start: 2021-03-12

## 2021-03-12 NOTE — TELEPHONE ENCOUNTER
Patient is requesting a refill       Requested Prescriptions     Pending Prescriptions Disp Refills    traMADol (ULTRAM) 50 mg tablet 30 tablet 0     Sig: Take 1 tablet (50 mg total) by mouth 2 (two) times a day as needed for moderate pain (headache)       Please review and advise

## 2021-03-19 ENCOUNTER — OFFICE VISIT (OUTPATIENT)
Dept: FAMILY MEDICINE CLINIC | Facility: CLINIC | Age: 46
End: 2021-03-19
Payer: COMMERCIAL

## 2021-03-19 VITALS
DIASTOLIC BLOOD PRESSURE: 84 MMHG | WEIGHT: 230.25 LBS | RESPIRATION RATE: 18 BRPM | OXYGEN SATURATION: 99 % | HEIGHT: 64 IN | HEART RATE: 68 BPM | TEMPERATURE: 97.8 F | SYSTOLIC BLOOD PRESSURE: 136 MMHG | BODY MASS INDEX: 39.31 KG/M2

## 2021-03-19 DIAGNOSIS — N94.6 DYSMENORRHEA: ICD-10-CM

## 2021-03-19 DIAGNOSIS — E78.5 DYSLIPIDEMIA: ICD-10-CM

## 2021-03-19 DIAGNOSIS — R51.9 NONINTRACTABLE HEADACHE, UNSPECIFIED CHRONICITY PATTERN, UNSPECIFIED HEADACHE TYPE: ICD-10-CM

## 2021-03-19 DIAGNOSIS — G43.709 CHRONIC MIGRAINE WITHOUT AURA WITHOUT STATUS MIGRAINOSUS, NOT INTRACTABLE: ICD-10-CM

## 2021-03-19 DIAGNOSIS — F41.8 DEPRESSION WITH ANXIETY: ICD-10-CM

## 2021-03-19 DIAGNOSIS — Z00.00 WELL ADULT EXAM: Primary | ICD-10-CM

## 2021-03-19 PROCEDURE — 3008F BODY MASS INDEX DOCD: CPT | Performed by: FAMILY MEDICINE

## 2021-03-19 PROCEDURE — 3725F SCREEN DEPRESSION PERFORMED: CPT | Performed by: FAMILY MEDICINE

## 2021-03-19 PROCEDURE — 1036F TOBACCO NON-USER: CPT | Performed by: FAMILY MEDICINE

## 2021-03-19 PROCEDURE — 99396 PREV VISIT EST AGE 40-64: CPT | Performed by: FAMILY MEDICINE

## 2021-03-19 RX ORDER — RIZATRIPTAN BENZOATE 10 MG/1
TABLET, ORALLY DISINTEGRATING ORAL
Qty: 9 TABLET | Refills: 2 | Status: SHIPPED | OUTPATIENT
Start: 2021-03-19 | End: 2021-07-12

## 2021-03-19 RX ORDER — TRAMADOL HYDROCHLORIDE 50 MG/1
50 TABLET ORAL 2 TIMES DAILY PRN
Qty: 30 TABLET | Refills: 0 | Status: SHIPPED | OUTPATIENT
Start: 2021-03-19 | End: 2021-04-09 | Stop reason: SDUPTHER

## 2021-03-19 RX ORDER — TOPIRAMATE 25 MG/1
TABLET ORAL
Qty: 60 TABLET | Refills: 1 | Status: SHIPPED | OUTPATIENT
Start: 2021-03-19 | End: 2021-05-12

## 2021-03-19 NOTE — PROGRESS NOTES
FAMILY PRACTICE OFFICE VISIT       NAME: Rosa Stewart  AGE: 41 y o  SEX: female       : 1975        MRN: [id number]        Assessment and Plan     Problem List Items Addressed This Visit        Cardiovascular and Mediastinum    Headache, migraine    Relevant Medications    rizatriptan (MAXALT-MLT) 10 MG disintegrating tablet    traMADol (ULTRAM) 50 mg tablet    topiramate (TOPAMAX) 25 mg tablet    Other Relevant Orders    CBC    Comprehensive metabolic panel    Vitamin D 25 hydroxy       Other    Depression with anxiety      Other Visit Diagnoses     Well adult exam    -  Primary    Nonintractable headache, unspecified chronicity pattern, unspecified headache type        Relevant Medications    traMADol (ULTRAM) 50 mg tablet    Dysmenorrhea        Relevant Orders    TSH, 3rd generation    Iron, TIBC and Ferritin Panel    Dyslipidemia        Relevant Orders    Lipid Panel with Direct LDL reflex       Patient presents for annual well exam     · She has successfully quit tobacco  ·  significant weight gain, patient is motivated to start with healthy lifestyle, exercise, healthy diet   Patient will proceed with complete blood work panel as outlined above  ·  Heavy menstrual cycles, patient is up-to-date with gyn exam and mammogram  Check TSH, CBC and iron panel  ·  Recurrent migraines, usually worse around menses  ·  Patient will start Topamax for migraine prevention, she will use 25 mg q h s  for 2 weeks and then will increase dose to 50 mg q h s , she will contact me with an update via my chart, will consider titrating dose up as long as patient tolerates medication well  I did  her on possible adverse side effect of Topamax including paresthesias  I am hopeful that Topamax therapy could be beneficial from weight loss standpoint  · I had long discussion with patient regarding abortive therapy for migraines    I advised her to start her abortive therapy with combination of ibuprofen and Maxalt and only use tramadol as second-line therapy  ·  patient will continue on Effexor  mg daily for chronic depression / anxiety, symptoms are well controlled  BMI Counseling: Body mass index is 39 52 kg/m²  The BMI is above normal  Nutrition recommendations include encouraging healthy choices of fruits and vegetables, consuming healthier snacks, moderation in carbohydrate intake and reducing intake of cholesterol  Exercise recommendations include exercising 3-5 times per week  Pharmacotherapy was ordered to help aid in weight loss  There are no Patient Instructions on file for this visit  Discussed with the patient and all questioned fully answered  She will call me if any problems arise  M*[employer] software was used to dictate this note  It may contain errors with dictating incorrect words/spelling  Please contact provider directly with any questions  Chief Complaint     Chief Complaint   Patient presents with    Well Check       History of Present Illness      Patient presents for annual well exam     She remains on Effexor  mg daily for treatment of depression anxiety, symptoms are well controlled  Patient is up-to-date with gyn exam and mammogram     She successfully quit smoking again, I commended her on this significant  achievement  Patient has been experiencing rather heavy menstrual cycles and recurrent migraines within past few months  He migraines usually flare up right before her menstrual cycle  Menses last 5-6 days, flow is very heavy for the 1st 2-3 days, patient uses 1 super tampon every 1 hour  She has been using combination of ibuprofen, tramadol and occasionally Excedrin for treatment of migraines  Headaches have not changed in pattern  I prescribed Imitrex to patient over a year ago  Unfortunately she ran out of prescription and has not been using it to abort migraine headaches lately  Patient denies symptoms of aura   Headaches associated with photophobia nausea, no significant vomiting, patient denies dizziness or visual changes  Review of Systems   Review of Systems   Constitutional: Negative  HENT: Negative  Eyes: Negative  Respiratory: Negative  Cardiovascular: Negative  Gastrointestinal: Negative  Endocrine: Negative  Genitourinary: Positive for menstrual problem ( heavy menstrual cycles)  Musculoskeletal: Negative  Skin: Negative  Allergic/Immunologic: Negative  Neurological: Positive for headaches  Hematological: Negative  Psychiatric/Behavioral: Negative  Active Problem List     Patient Active Problem List   Diagnosis    Depression with anxiety    Allergic rhinitis    Esophageal reflux    Headache, migraine    Obesity (BMI 30-39  9)    Vitamin D deficiency       Past Medical History:  History reviewed  No pertinent past medical history      Past Surgical History:  Past Surgical History:   Procedure Laterality Date    NO PAST SURGERIES         Family History:  Family History   Problem Relation Age of Onset    Diabetes Mother     Cerebral aneurysm Mother     Hypertension Mother     Heart attack Father 58    Hypertension Brother     Breast cancer Maternal Aunt        Social History:  Social History     Socioeconomic History    Marital status: /Civil Union     Spouse name: Not on file    Number of children: Not on file    Years of education: Not on file    Highest education level: Not on file   Occupational History    Not on file   Social Needs    Financial resource strain: Not on file    Food insecurity     Worry: Not on file     Inability: Not on file   [employer] needs     Medical: Not on file     Non-medical: Not on file   Tobacco Use    Smoking status: Former Smoker    Smokeless tobacco: Never Used   Substance and Sexual Activity    Alcohol use: Yes     Comment: social    Drug use: No    Sexual activity: Not on file   Lifestyle    Physical activity Days per week: Not on file     Minutes per session: Not on file    Stress: Not on file   Relationships    Social connections     Talks on phone: Not on file     Gets together: Not on file     Attends [employer] service: Not on file     Active member of club or organization: Not on file     Attends meetings of clubs or organizations: Not on file     Relationship status: Not on file    Intimate partner violence     Fear of current or ex partner: Not on file     Emotionally abused: Not on file     Physically abused: Not on file     Forced sexual activity: Not on file   Other Topics Concern    Not on file   Social History Narrative    Not on file           Objective     Vitals:    03/19/21 1431 03/19/21 1509   BP: 130/88 136/84   Pulse: 68    Resp: 18    Temp: 97 8 °F (36 6 °C)    SpO2: 99%    Weight: 104 kg (230 lb 4 oz)    Height: 5' 4" (1 626 m)      Wt Readings from Last 3 Encounters:   03/19/21 104 kg (230 lb 4 oz)   03/05/20 101 kg (222 lb 12 8 oz)   11/07/19 103 kg (226 lb)       Physical Exam  Vitals signs and nursing note reviewed  Constitutional:       General: She is not in acute distress  Appearance: Normal appearance  She is well-developed  She is not ill-appearing  HENT:      Head: Normocephalic and atraumatic  Eyes:      General: No scleral icterus  Conjunctiva/sclera: Conjunctivae normal    Neck:      Musculoskeletal: Neck supple  No neck rigidity  Thyroid: No thyromegaly  Vascular: No carotid bruit  Cardiovascular:      Rate and Rhythm: Normal rate and regular rhythm  Heart sounds: Normal heart sounds  No murmur  Pulmonary:      Effort: Pulmonary effort is normal  No respiratory distress  Breath sounds: Normal breath sounds  No wheezing  Abdominal:      General: Bowel sounds are normal  There is no distension or abdominal bruit  Palpations: Abdomen is soft  Tenderness: There is no abdominal tenderness  Musculoskeletal: Normal range of motion  Right lower leg: No edema  Left lower leg: No edema  Neurological:      General: No focal deficit present  Mental Status: She is alert and oriented to person, place, and time  Cranial Nerves: No cranial nerve deficit  Coordination: Coordination normal    Psychiatric:         Mood and Affect: Mood normal          Behavior: Behavior normal          Thought Content: Thought content normal          Pertinent Laboratory/Diagnostic Studies:  No results found for: GLUCOSE, BUN, CREATININE, CALCIUM, NA, K, CO2, CL  No results found for: ALT, AST, GGT, ALKPHOS, BILITOT    No results found for: WBC, HGB, HCT, MCV, PLT    No results found for: TSH    No results found for: CHOL  No results found for: TRIG  No results found for: HDL  No results found for: [facility]  Lab Results   Component Value Date    HGBA1C 5 4 12/18/2019       Results for orders placed or performed in visit on 11/07/19   Urine culture    Specimen: Urine, Clean Catch    URINE   Result Value Ref Range    Urine Culture Result Final report    Urinalysis with microscopic   Result Value Ref Range    Specific Gravity 1 023 1 005 - 1 030    Ph 6 0 5 0 - 7 5    Color UA Yellow Yellow    Urine Appearance Clear Clear    Leukocyte Esterase Trace (A) Negative    Protein Negative Negative/Trace    Glucose, 24 HR Urine Negative Negative    Ketone, Urine Negative Negative    Blood, Urine 2+ (A) Negative    Bilirubin, Urine Negative Negative    Urobilinogen Urine 0 2 0 2 - 1 0 mg/dL    SL AMB NITRITES URINE, QUAL  Negative Negative    Microscopic Examination See below:    Microscopic Examination   Result Value Ref Range    SL AMB WBC, URINE 0-5 0 - 5 /hpf    RBC, Urine 3-10 (A) 0 - 2 /hpf    Epithelial Cells (non renal) 0-10 0 - 10 /hpf    MUCUS THREADS Present Not Estab      Bacteria, Urine Few None seen/Few   Result   Result Value Ref Range    Result 1 No growth    POCT urine dip   Result Value Ref Range    LEUKOCYTE ESTERASE,UA -     NITRITE,UA -     SL AMB POCT UROBILINOGEN -     POCT URINE PROTEIN -      PH,UA 5 0     BLOOD,UA +++     SPECIFIC GRAVITY,UA 1 005     KETONES,UA -     BILIRUBIN,UA -     GLUCOSE, UA -      COLOR,UA yellow     CLARITY,UA clear        Orders Placed This Encounter   Procedures    CBC    Comprehensive metabolic panel    Lipid Panel with Direct LDL reflex    TSH, 3rd generation    Vitamin D 25 hydroxy    Iron, TIBC and Ferritin Panel       ALLERGIES:  Allergies   Allergen Reactions    Clarithromycin Other (See Comments)     nausea    Sulfa Antibiotics GI Intolerance    Sulfamethoxazole-Trimethoprim Other (See Comments)     rash       Current Medications     Current Outpatient Medications   Medication Sig Dispense Refill    LORazepam (ATIVAN) 0 5 mg tablet Take 1 tablet (0 5 mg total) by mouth daily as needed for anxiety 30 tablet 0    pantoprazole (PROTONIX) 40 mg tablet TAKE 1 TABLET DAILY 90 tablet 3    rizatriptan (MAXALT-MLT) 10 MG disintegrating tablet Take 1 tablet with the onset of migraine, okay to repeat dose in 2 hours, max dose 2 tablets in 24 hours 9 tablet 2    traMADol (ULTRAM) 50 mg tablet Take 1 tablet (50 mg total) by mouth 2 (two) times a day as needed for moderate pain (headache) 30 tablet 0    valACYclovir (VALTREX) 500 mg tablet Take 500 mg by mouth      venlafaxine (EFFEXOR-XR) 150 mg 24 hr capsule Take 1 capsule (150 mg total) by mouth daily 90 capsule 3    topiramate (TOPAMAX) 25 mg tablet Take 1 tablet at bedtime for 10 days then increase dose to 2 tablets at bedtime 60 tablet 1     No current facility-administered medications for this visit          Medications Discontinued During This Encounter   Medication Reason    naloxone (NARCAN) 4 mg/0 1 mL nasal spray     varenicline (CHANTIX) 1 mg tablet     varenicline (Chantix Starting Month Pak) 0 5 MG X 11 & 1 MG X 42 tablet     rizatriptan (MAXALT-MLT) 10 MG disintegrating tablet Reorder    traMADol (ULTRAM) 50 mg tablet Popdeem Maintenance     Health Maintenance   Topic Date Due    DTaP,Tdap,and Td Vaccines (1 - Tdap) Never done    Influenza Vaccine (1) 09/01/2020    Annual Physical  03/05/2021    BMI: Followup Plan  03/10/2021    MAMMOGRAM  03/04/2022    BMI: Adult  03/19/2022    Cervical Cancer Screening  02/22/2023    HIV Screening  Completed    COVID-19 Vaccine  Completed    Pneumococcal Vaccine: Pediatrics (0 to 5 Years) and At-Risk Patients (6 to 59 Years)  Aged Out    HIB Vaccine  Aged Out    Hepatitis B Vaccine  Aged Out    IPV Vaccine  Aged Out    Hepatitis A Vaccine  Aged Out    Meningococcal ACWY Vaccine  Aged Out    HPV Vaccine  Aged [employer] History   Administered Date(s) Administered    INFLUENZA 10/16/2015, 10/31/2018, 10/05/2019    Influenza, seasonal, injectable 10/06/2016    SARS-CoV-2 / COVID-19 mRNA IM ([employer]) 01/10/2021, 01/29/2021       Maci Marie MD

## 2021-04-09 DIAGNOSIS — R51.9 NONINTRACTABLE HEADACHE, UNSPECIFIED CHRONICITY PATTERN, UNSPECIFIED HEADACHE TYPE: ICD-10-CM

## 2021-04-09 RX ORDER — TRAMADOL HYDROCHLORIDE 50 MG/1
50 TABLET ORAL 2 TIMES DAILY PRN
Qty: 30 TABLET | Refills: 0 | Status: SHIPPED | OUTPATIENT
Start: 2021-04-09 | End: 2021-04-26 | Stop reason: SDUPTHER

## 2021-04-09 NOTE — TELEPHONE ENCOUNTER
----- Message from Ctaalina Belcher sent at 4/9/2021  1:07 PM EDT -----  Regarding: Prescription Question  Contact: [phone]  Just checking on my tramadol refill before the weekend       Thanks  Saritha Cummings

## 2021-04-09 NOTE — TELEPHONE ENCOUNTER
----- Message from Marii Reis sent at 4/9/2021  1:07 PM EDT -----  Regarding: Prescription Question  Contact: [phone]  Just checking on my tramadol refill before the weekend       Thanks  Ronit Caraballo

## 2021-04-09 NOTE — TELEPHONE ENCOUNTER
----- Message from Jh Looney sent at 4/9/2021  1:07 PM EDT -----  Regarding: Prescription Question  Contact: [phone]  Just checking on my tramadol refill before the weekend       Thanks  Zohaib

## 2021-04-19 LAB
25(OH)D3+25(OH)D2 SERPL-MCNC: 12.4 NG/ML (ref 30–100)
ALBUMIN SERPL-MCNC: 4.4 G/DL (ref 3.8–4.8)
ALBUMIN/GLOB SERPL: 1.8 {RATIO} (ref 1.2–2.2)
ALP SERPL-CCNC: 64 IU/L (ref 39–117)
ALT SERPL-CCNC: 9 IU/L (ref 0–32)
AST SERPL-CCNC: 10 IU/L (ref 0–40)
BASOPHILS # BLD AUTO: 0 X10E3/UL (ref 0–0.2)
BASOPHILS NFR BLD AUTO: 1 %
BILIRUB SERPL-MCNC: 0.3 MG/DL (ref 0–1.2)
BUN SERPL-MCNC: 12 MG/DL (ref 6–24)
BUN/CREAT SERPL: 13 (ref 9–23)
CALCIUM SERPL-MCNC: 9.1 MG/DL (ref 8.7–10.2)
CHLORIDE SERPL-SCNC: 106 MMOL/L (ref 96–106)
CHOLEST SERPL-MCNC: 167 MG/DL (ref 100–199)
CO2 SERPL-SCNC: 18 MMOL/L (ref 20–29)
CREAT SERPL-MCNC: 0.9 MG/DL (ref 0.57–1)
EOSINOPHIL # BLD AUTO: 0.2 X10E3/UL (ref 0–0.4)
EOSINOPHIL NFR BLD AUTO: 5 %
ERYTHROCYTE [DISTWIDTH] IN BLOOD BY AUTOMATED COUNT: 14.7 % (ref 11.7–15.4)
FERRITIN SERPL-MCNC: 10 NG/ML (ref 15–150)
GLOBULIN SER-MCNC: 2.4 G/DL (ref 1.5–4.5)
GLUCOSE SERPL-MCNC: 85 MG/DL (ref 65–99)
HCT VFR BLD AUTO: 36.5 % (ref 34–46.6)
HDLC SERPL-MCNC: 47 MG/DL
HGB BLD-MCNC: 12.7 G/DL (ref 11.1–15.9)
IMM GRANULOCYTES # BLD: 0 X10E3/UL (ref 0–0.1)
IMM GRANULOCYTES NFR BLD: 0 %
IRON SATN MFR SERPL: 9 % (ref 15–55)
IRON SERPL-MCNC: 27 UG/DL (ref 27–159)
LDLC SERPL CALC-MCNC: 106 MG/DL (ref 0–99)
LYMPHOCYTES # BLD AUTO: 2 X10E3/UL (ref 0.7–3.1)
LYMPHOCYTES NFR BLD AUTO: 39 %
MCH RBC QN AUTO: 30.2 PG (ref 26.6–33)
MCHC RBC AUTO-ENTMCNC: 34.8 G/DL (ref 31.5–35.7)
MCV RBC AUTO: 87 FL (ref 79–97)
MONOCYTES # BLD AUTO: 0.4 X10E3/UL (ref 0.1–0.9)
MONOCYTES NFR BLD AUTO: 8 %
NEUTROPHILS # BLD AUTO: 2.4 X10E3/UL (ref 1.4–7)
NEUTROPHILS NFR BLD AUTO: 47 %
PLATELET # BLD AUTO: 368 X10E3/UL (ref 150–450)
POTASSIUM SERPL-SCNC: 3.9 MMOL/L (ref 3.5–5.2)
PROT SERPL-MCNC: 6.8 G/DL (ref 6–8.5)
RBC # BLD AUTO: 4.21 X10E6/UL (ref 3.77–5.28)
SL AMB EGFR AFRICAN AMERICAN: 90 ML/MIN/1.73
SL AMB EGFR NON AFRICAN AMERICAN: 78 ML/MIN/1.73
SODIUM SERPL-SCNC: 139 MMOL/L (ref 134–144)
TIBC SERPL-MCNC: 304 UG/DL (ref 250–450)
TRIGL SERPL-MCNC: 75 MG/DL (ref 0–149)
TSH SERPL DL<=0.005 MIU/L-ACNC: 1.35 UIU/ML (ref 0.45–4.5)
UIBC SERPL-MCNC: 277 UG/DL (ref 131–425)
WBC # BLD AUTO: 5 X10E3/UL (ref 3.4–10.8)

## 2021-04-20 ENCOUNTER — TELEPHONE (OUTPATIENT)
Dept: FAMILY MEDICINE CLINIC | Facility: CLINIC | Age: 46
End: 2021-04-20

## 2021-04-20 DIAGNOSIS — E55.9 VITAMIN D DEFICIENCY: Primary | ICD-10-CM

## 2021-04-20 DIAGNOSIS — E61.1 IRON DEFICIENCY: Chronic | ICD-10-CM

## 2021-04-20 NOTE — TELEPHONE ENCOUNTER
Spoke with pt  Made aware as per provider's orders  Pt verbalized understanding and had no questions at this time  Pt states she uses [facility]  Pharmacy in pt's profile

## 2021-04-20 NOTE — TELEPHONE ENCOUNTER
Please contact patient  I reviewed her blood work results  All labs are normal aside from very low level of vitamin-D and low level of iron which are likely significantly contributing to her recurrent headaches and fatigue  Plan  ·  start prescription iron daily  ·  start vitamin D2 67797 units once a week  ·  repeat blood work in 3 months to reassess levels, no need to fast     ·  please find out which pharmacy does patient want me to use so I can send prescriptions    Thank you

## 2021-04-21 PROBLEM — E61.1 IRON DEFICIENCY: Chronic | Status: ACTIVE | Noted: 2021-04-21

## 2021-04-21 RX ORDER — IRON POLYSACCHARIDE COMPLEX 150 MG
150 CAPSULE ORAL DAILY
Qty: 90 CAPSULE | Refills: 1 | Status: SHIPPED | OUTPATIENT
Start: 2021-04-21 | End: 2021-11-18 | Stop reason: SDUPTHER

## 2021-04-21 RX ORDER — ERGOCALCIFEROL 1.25 MG/1
50000 CAPSULE ORAL WEEKLY
Qty: 12 CAPSULE | Refills: 0 | Status: SHIPPED | OUTPATIENT
Start: 2021-04-21 | End: 2022-06-21

## 2021-04-21 NOTE — TELEPHONE ENCOUNTER
Prescription sent  Blood work  orders printed, it should be done in 3 months  Please mail to patient     Thank you

## 2021-04-25 ENCOUNTER — PATIENT MESSAGE (OUTPATIENT)
Dept: FAMILY MEDICINE CLINIC | Facility: CLINIC | Age: 46
End: 2021-04-25

## 2021-04-25 DIAGNOSIS — R51.9 NONINTRACTABLE HEADACHE, UNSPECIFIED CHRONICITY PATTERN, UNSPECIFIED HEADACHE TYPE: ICD-10-CM

## 2021-04-26 RX ORDER — TRAMADOL HYDROCHLORIDE 50 MG/1
50 TABLET ORAL 2 TIMES DAILY PRN
Qty: 30 TABLET | Refills: 0 | Status: SHIPPED | OUTPATIENT
Start: 2021-04-26 | End: 2021-05-14 | Stop reason: SDUPTHER

## 2021-04-26 RX ORDER — TRAMADOL HYDROCHLORIDE 50 MG/1
TABLET ORAL
Qty: 30 TABLET | OUTPATIENT
Start: 2021-04-26

## 2021-04-26 NOTE — TELEPHONE ENCOUNTER
From: Ashley Bone  To: Dalila Perales MD  Sent: 4/25/2021 5:00 PM EDT  Subject: Prescription Question    Dr Cathi Bal  I'm having a very bad migraine  I've tried everything and have no tramidol  I'm hoping you are able to call some in tonight  If not to [facility], please call in to [facility] [address]  I'm not sure of the hour's

## 2021-04-26 NOTE — TELEPHONE ENCOUNTER
Patient is requesting tramadol for a headache       Requested Prescriptions     Pending Prescriptions Disp Refills    traMADol (ULTRAM) 50 mg tablet 30 tablet 0     Sig: Take 1 tablet (50 mg total) by mouth 2 (two) times a day as needed for moderate pain (headache)       Please review and advise

## 2021-04-29 ENCOUNTER — TELEPHONE (OUTPATIENT)
Dept: FAMILY MEDICINE CLINIC | Facility: CLINIC | Age: 46
End: 2021-04-29

## 2021-04-29 DIAGNOSIS — G43.709 CHRONIC MIGRAINE WITHOUT AURA WITHOUT STATUS MIGRAINOSUS, NOT INTRACTABLE: Primary | ICD-10-CM

## 2021-04-29 RX ORDER — PREDNISONE 10 MG/1
TABLET ORAL
Qty: 20 TABLET | Refills: 0 | Status: SHIPPED | OUTPATIENT
Start: 2021-04-29 | End: 2021-11-18

## 2021-04-29 NOTE — TELEPHONE ENCOUNTER
I spoke with patient and gave her the message and she stated that she would like to try the prednisone  If you could send to [facility] for her  She said thank you

## 2021-04-29 NOTE — TELEPHONE ENCOUNTER
Regarding: Prescription Question  Contact: [phone]  ----- Message from Carlene Joshi MA sent at 4/29/2021  1:53 PM EDT -----  Please review and advise     ----- Message from Ginger Goncalves to Eileen Wang MD sent at 4/29/2021  1:23 PM -----   Dr Estrella Francisco  I appreciate your concern with not wanting to call anything in stronger   Unfortunately,  I know my body and my cycle is coming,  so please please dr Estrella Francisco call something in

## 2021-04-29 NOTE — TELEPHONE ENCOUNTER
Spoke with patient and she states that she will not go to the emergency room for this  She then called back about 10 minutes later and she stated that she just would like to have her tramadol increased  I explained to her that she needs to go to the ER and that the Dr stated she is not going to give any stronger medications without proper evaluation  She then stated that she knows the reason why she has it so bad is because she is getting her monthly soon  But she said she will just deal with it then

## 2021-04-29 NOTE — TELEPHONE ENCOUNTER
Please contact patient  Based on her headache description and severe symptoms-she does need to go to emergency room  To have proper workup and head imaging done  I am not comfortable to prescribing any stronger medications without appropriate evaluation  Please place ADT 21 orders    Thank you

## 2021-04-29 NOTE — TELEPHONE ENCOUNTER
Please contact patient  I can send prescription for prednisone  Often enough use prednisone taper to abort migraine flare  If she is agreeable- I will do it  She you will be able to continue p r n  Maxalt and tramadol on top of prednisone     Please let me know  Thank you

## 2021-05-12 DIAGNOSIS — R51.9 NONINTRACTABLE HEADACHE, UNSPECIFIED CHRONICITY PATTERN, UNSPECIFIED HEADACHE TYPE: ICD-10-CM

## 2021-05-12 DIAGNOSIS — G43.709 CHRONIC MIGRAINE WITHOUT AURA WITHOUT STATUS MIGRAINOSUS, NOT INTRACTABLE: ICD-10-CM

## 2021-05-12 RX ORDER — TOPIRAMATE 25 MG/1
TABLET ORAL
Qty: 60 TABLET | Refills: 3 | Status: SHIPPED | OUTPATIENT
Start: 2021-05-12 | End: 2021-07-12 | Stop reason: SDUPTHER

## 2021-05-12 RX ORDER — TRAMADOL HYDROCHLORIDE 50 MG/1
50 TABLET ORAL 2 TIMES DAILY PRN
Qty: 30 TABLET | Refills: 0 | Status: CANCELLED | OUTPATIENT
Start: 2021-05-12

## 2021-05-13 ENCOUNTER — TELEPHONE (OUTPATIENT)
Dept: FAMILY MEDICINE CLINIC | Facility: CLINIC | Age: 46
End: 2021-05-13

## 2021-05-13 DIAGNOSIS — R51.9 NONINTRACTABLE HEADACHE, UNSPECIFIED CHRONICITY PATTERN, UNSPECIFIED HEADACHE TYPE: ICD-10-CM

## 2021-05-13 RX ORDER — TRAMADOL HYDROCHLORIDE 50 MG/1
50 TABLET ORAL 2 TIMES DAILY PRN
Qty: 30 TABLET | Refills: 0 | Status: CANCELLED | OUTPATIENT
Start: 2021-05-13

## 2021-05-13 NOTE — TELEPHONE ENCOUNTER
----- Message from Boris Oconnell sent at 5/13/2021  8:30 AM EDT -----  Regarding: Prescription Question  Contact: [phone]  Please refill tramadol

## 2021-05-14 DIAGNOSIS — R51.9 NONINTRACTABLE HEADACHE, UNSPECIFIED CHRONICITY PATTERN, UNSPECIFIED HEADACHE TYPE: ICD-10-CM

## 2021-05-14 RX ORDER — TRAMADOL HYDROCHLORIDE 50 MG/1
50 TABLET ORAL 2 TIMES DAILY PRN
Qty: 30 TABLET | Refills: 0 | Status: SHIPPED | OUTPATIENT
Start: 2021-05-14 | End: 2021-05-18 | Stop reason: SDUPTHER

## 2021-05-16 ENCOUNTER — PATIENT MESSAGE (OUTPATIENT)
Dept: FAMILY MEDICINE CLINIC | Facility: CLINIC | Age: 46
End: 2021-05-16

## 2021-05-16 DIAGNOSIS — R51.9 NONINTRACTABLE HEADACHE, UNSPECIFIED CHRONICITY PATTERN, UNSPECIFIED HEADACHE TYPE: ICD-10-CM

## 2021-05-18 DIAGNOSIS — R51.9 NONINTRACTABLE HEADACHE, UNSPECIFIED CHRONICITY PATTERN, UNSPECIFIED HEADACHE TYPE: ICD-10-CM

## 2021-05-18 RX ORDER — TRAMADOL HYDROCHLORIDE 50 MG/1
50 TABLET ORAL 2 TIMES DAILY PRN
Qty: 30 TABLET | Refills: 0 | Status: CANCELLED | OUTPATIENT
Start: 2021-05-18

## 2021-05-18 RX ORDER — TRAMADOL HYDROCHLORIDE 50 MG/1
50 TABLET ORAL 2 TIMES DAILY PRN
Qty: 30 TABLET | Refills: 0 | Status: SHIPPED | OUTPATIENT
Start: 2021-05-18 | End: 2021-06-09

## 2021-05-18 NOTE — TELEPHONE ENCOUNTER
----- Message from Beth Francis sent at 5/18/2021  2:10 PM EDT -----  Regarding: Prescription Question  Contact: [phone]  Please call my tramadol in to [facility] today      Thank you

## 2021-05-18 NOTE — TELEPHONE ENCOUNTER
L/m for pt that script was sent to [facility] on 5/14/21  Advised pt to please contact [facility] to track the prescription

## 2021-06-08 DIAGNOSIS — R51.9 NONINTRACTABLE HEADACHE, UNSPECIFIED CHRONICITY PATTERN, UNSPECIFIED HEADACHE TYPE: ICD-10-CM

## 2021-06-09 ENCOUNTER — TELEPHONE (OUTPATIENT)
Dept: FAMILY MEDICINE CLINIC | Facility: CLINIC | Age: 46
End: 2021-06-09

## 2021-06-09 DIAGNOSIS — R51.9 NONINTRACTABLE HEADACHE, UNSPECIFIED CHRONICITY PATTERN, UNSPECIFIED HEADACHE TYPE: ICD-10-CM

## 2021-06-09 RX ORDER — TRAMADOL HYDROCHLORIDE 50 MG/1
50 TABLET ORAL 2 TIMES DAILY PRN
Qty: 30 TABLET | Refills: 0 | OUTPATIENT
Start: 2021-06-09

## 2021-06-09 RX ORDER — TRAMADOL HYDROCHLORIDE 50 MG/1
TABLET ORAL
Qty: 30 TABLET | Refills: 0 | Status: SHIPPED | OUTPATIENT
Start: 2021-06-09 | End: 2021-06-24 | Stop reason: SDUPTHER

## 2021-06-09 NOTE — TELEPHONE ENCOUNTER
----- Message from Darlene Stapleton sent at 6/9/2021 12:21 PM EDT -----  Regarding: Prescription Question  Contact: [phone]  Hi  Just checking on the refill status of my tramadol  Thank you so much!

## 2021-06-12 DIAGNOSIS — F41.8 DEPRESSION WITH ANXIETY: ICD-10-CM

## 2021-06-12 RX ORDER — VENLAFAXINE HYDROCHLORIDE 150 MG/1
CAPSULE, EXTENDED RELEASE ORAL
Qty: 90 CAPSULE | Refills: 3 | Status: SHIPPED | OUTPATIENT
Start: 2021-06-12 | End: 2022-05-01

## 2021-06-23 DIAGNOSIS — R51.9 NONINTRACTABLE HEADACHE, UNSPECIFIED CHRONICITY PATTERN, UNSPECIFIED HEADACHE TYPE: ICD-10-CM

## 2021-06-24 ENCOUNTER — TELEPHONE (OUTPATIENT)
Dept: FAMILY MEDICINE CLINIC | Facility: CLINIC | Age: 46
End: 2021-06-24

## 2021-06-24 DIAGNOSIS — R51.9 NONINTRACTABLE HEADACHE, UNSPECIFIED CHRONICITY PATTERN, UNSPECIFIED HEADACHE TYPE: ICD-10-CM

## 2021-06-24 RX ORDER — TRAMADOL HYDROCHLORIDE 50 MG/1
50 TABLET ORAL 2 TIMES DAILY PRN
Qty: 30 TABLET | Refills: 0 | Status: SHIPPED | OUTPATIENT
Start: 2021-06-24 | End: 2021-07-12 | Stop reason: SDUPTHER

## 2021-06-24 RX ORDER — TRAMADOL HYDROCHLORIDE 50 MG/1
TABLET ORAL
Qty: 30 TABLET | Refills: 0 | OUTPATIENT
Start: 2021-06-24

## 2021-06-24 RX ORDER — TRAMADOL HYDROCHLORIDE 50 MG/1
50 TABLET ORAL 2 TIMES DAILY PRN
Qty: 30 TABLET | Refills: 0 | Status: SHIPPED | OUTPATIENT
Start: 2021-06-24 | End: 2021-06-24 | Stop reason: SDUPTHER

## 2021-06-24 NOTE — TELEPHONE ENCOUNTER
Printed prescription was shredded      Electronic refill for tramadol for 30 tablets sent to the pharmacy as requested

## 2021-07-03 DIAGNOSIS — E55.9 VITAMIN D DEFICIENCY: ICD-10-CM

## 2021-07-12 ENCOUNTER — TELEPHONE (OUTPATIENT)
Dept: FAMILY MEDICINE CLINIC | Facility: CLINIC | Age: 46
End: 2021-07-12

## 2021-07-12 DIAGNOSIS — R51.9 NONINTRACTABLE HEADACHE, UNSPECIFIED CHRONICITY PATTERN, UNSPECIFIED HEADACHE TYPE: ICD-10-CM

## 2021-07-12 DIAGNOSIS — G43.709 CHRONIC MIGRAINE WITHOUT AURA WITHOUT STATUS MIGRAINOSUS, NOT INTRACTABLE: Primary | ICD-10-CM

## 2021-07-12 RX ORDER — TOPIRAMATE 25 MG/1
TABLET ORAL
Qty: 90 TABLET | Refills: 3 | Status: SHIPPED | OUTPATIENT
Start: 2021-07-12 | End: 2021-11-18

## 2021-07-12 RX ORDER — TRAMADOL HYDROCHLORIDE 50 MG/1
50 TABLET ORAL 2 TIMES DAILY PRN
Qty: 30 TABLET | Refills: 0 | Status: SHIPPED | OUTPATIENT
Start: 2021-07-12 | End: 2021-08-05 | Stop reason: SDUPTHER

## 2021-07-12 RX ORDER — KETOROLAC TROMETHAMINE 10 MG/1
10 TABLET, FILM COATED ORAL DAILY PRN
Qty: 10 TABLET | Refills: 0 | Status: SHIPPED | OUTPATIENT
Start: 2021-07-12 | End: 2022-06-21 | Stop reason: SDUPTHER

## 2021-07-12 NOTE — TELEPHONE ENCOUNTER
I spoke with patient regarding ongoing symptoms of migraine headaches  Patient states that Maxalt has been ineffective  She takes up to 4 tramadol on the days when she has migraine flare  Patient reports under significant amount of work related stress that is significantly contributing to her headaches  She remains on Effexor and 50 mg of Topamax every day as directed  Patient states that Relpax was not effective for headache  in the past   She did notice significant improvement after trial of prednisone  I discussed my concern about ongoing use of tramadol and possibility of dependence  I explained patient that we need to look for alternative / additional medications for headache  so she would not rely on tramadol as heavily as she does at present time  Patient understands  and agrees       plan  ·  increase Topamax from 50-75 mg q h s   ·  discontinue Maxalt, trial of Toradol that patient will use sparingly once a day only as needed along with tramadol  ·  referral to [facility]  ·  brain MRI due to persistent headaches

## 2021-07-12 NOTE — TELEPHONE ENCOUNTER
----- Message from Thien Elliott sent at 7/12/2021  4:53 PM EDT -----  Regarding: RE: Prescription Question  Contact: [phone]  I would greatly appreciate if this could be refilled today as I need it  Thank you    ----- Message -----  From: Cordelia Zapien  Sent: 7/12/21 1:34 PM  To: Thien Elliott  Subject: RE: Prescription Question    Yes      ----- Message -----       From:Wendy Darwin Dubin       Sent:7/12/2021  1:20 PM EDT         To:Jerrica Erickson MD    Subject:Prescription Question    I would like to make sure my message was received      Thank you

## 2021-07-14 RX ORDER — ERGOCALCIFEROL 1.25 MG/1
CAPSULE ORAL
Qty: 12 CAPSULE | Refills: 0 | OUTPATIENT
Start: 2021-07-14

## 2021-07-14 NOTE — TELEPHONE ENCOUNTER
Please contact patient  We need to proceed with repeat blood work to reassess level of vitamin-D prior to refilling her prescription  She should complete 12 weeks of vitamin-D therapy and then have blood work done, as we have discussed during last office visit    Thank you

## 2021-07-16 ENCOUNTER — TELEPHONE (OUTPATIENT)
Dept: FAMILY MEDICINE CLINIC | Facility: CLINIC | Age: 46
End: 2021-07-16

## 2021-07-16 DIAGNOSIS — F41.8 DEPRESSION WITH ANXIETY: Primary | ICD-10-CM

## 2021-07-16 DIAGNOSIS — F41.8 DEPRESSION WITH ANXIETY: ICD-10-CM

## 2021-07-16 RX ORDER — VENLAFAXINE HYDROCHLORIDE 150 MG/1
150 CAPSULE, EXTENDED RELEASE ORAL
Qty: 30 CAPSULE | Refills: 0 | Status: SHIPPED | OUTPATIENT
Start: 2021-07-16 | End: 2022-06-21

## 2021-07-16 NOTE — TELEPHONE ENCOUNTER
Please let patient know  I will send  30 day supply of medication to [facility] pharmacy as well      Thank you

## 2021-07-16 NOTE — TELEPHONE ENCOUNTER
----- Message from Lazarus Mule sent at 7/16/2021  6:35 AM EDT -----  Regarding: Prescription Question  Contact: [phone]  Hi Dr Pravin Victor     Can you please call in Effexor to [facility] [address]  I have been out for 3 days  My body has been really starting to feel it  I am on day 2 of missing work  If you could do a work note for me I would greatly appreciate it      Vivien Hamman

## 2021-07-16 NOTE — TELEPHONE ENCOUNTER
Pt called back, advised that this med was filled on 6/12/21 to [facility]  She will be contacting the mail order to reorder medication however the script was for a 90 day supply with 3 refills  Pts script is for one capsule daily

## 2021-07-16 NOTE — TELEPHONE ENCOUNTER
L/M for pt that script was filled by [address] for 90 days with 3 RF  Advised to call office for further triage

## 2021-08-05 DIAGNOSIS — R51.9 NONINTRACTABLE HEADACHE, UNSPECIFIED CHRONICITY PATTERN, UNSPECIFIED HEADACHE TYPE: ICD-10-CM

## 2021-08-06 RX ORDER — TRAMADOL HYDROCHLORIDE 50 MG/1
50 TABLET ORAL 2 TIMES DAILY PRN
Qty: 30 TABLET | Refills: 0 | Status: SHIPPED | OUTPATIENT
Start: 2021-08-06 | End: 2021-08-31 | Stop reason: SDUPTHER

## 2021-08-24 DIAGNOSIS — F41.8 DEPRESSION WITH ANXIETY: ICD-10-CM

## 2021-08-24 RX ORDER — LORAZEPAM 0.5 MG/1
TABLET ORAL
Qty: 30 TABLET | Refills: 0 | Status: SHIPPED | OUTPATIENT
Start: 2021-08-24 | End: 2021-11-18 | Stop reason: SDUPTHER

## 2021-08-31 DIAGNOSIS — R51.9 NONINTRACTABLE HEADACHE, UNSPECIFIED CHRONICITY PATTERN, UNSPECIFIED HEADACHE TYPE: ICD-10-CM

## 2021-09-01 ENCOUNTER — TELEPHONE (OUTPATIENT)
Dept: FAMILY MEDICINE CLINIC | Facility: CLINIC | Age: 46
End: 2021-09-01

## 2021-09-01 RX ORDER — TRAMADOL HYDROCHLORIDE 50 MG/1
50 TABLET ORAL 2 TIMES DAILY PRN
Qty: 30 TABLET | Refills: 0 | Status: SHIPPED | OUTPATIENT
Start: 2021-09-01 | End: 2021-10-05

## 2021-09-01 NOTE — TELEPHONE ENCOUNTER
----- Message from Jasper Francois sent at 9/1/2021  3:28 PM EDT -----  Regarding: Prescription Question  Contact: [phone]  I would like to make sure my request for refill of tramadol was received

## 2021-09-01 NOTE — TELEPHONE ENCOUNTER
Reviewed pt's chart, we did receive Rx request; pending MD's approval for RF  Called pt and LM as per above

## 2021-10-05 DIAGNOSIS — R51.9 NONINTRACTABLE HEADACHE, UNSPECIFIED CHRONICITY PATTERN, UNSPECIFIED HEADACHE TYPE: ICD-10-CM

## 2021-10-05 RX ORDER — TRAMADOL HYDROCHLORIDE 50 MG/1
TABLET ORAL
Qty: 30 TABLET | Refills: 0 | Status: SHIPPED | OUTPATIENT
Start: 2021-10-05 | End: 2021-10-19 | Stop reason: SDUPTHER

## 2021-10-19 ENCOUNTER — TELEPHONE (OUTPATIENT)
Dept: FAMILY MEDICINE CLINIC | Facility: CLINIC | Age: 46
End: 2021-10-19

## 2021-10-19 DIAGNOSIS — R51.9 NONINTRACTABLE HEADACHE, UNSPECIFIED CHRONICITY PATTERN, UNSPECIFIED HEADACHE TYPE: ICD-10-CM

## 2021-10-19 RX ORDER — TRAMADOL HYDROCHLORIDE 50 MG/1
50 TABLET ORAL 2 TIMES DAILY PRN
Qty: 30 TABLET | Refills: 0 | Status: SHIPPED | OUTPATIENT
Start: 2021-10-19 | End: 2021-11-18 | Stop reason: SDUPTHER

## 2021-10-19 RX ORDER — TRAMADOL HYDROCHLORIDE 50 MG/1
TABLET ORAL
Qty: 30 TABLET | Refills: 0 | Status: CANCELLED | OUTPATIENT
Start: 2021-10-19

## 2021-11-17 DIAGNOSIS — G43.709 CHRONIC MIGRAINE WITHOUT AURA WITHOUT STATUS MIGRAINOSUS, NOT INTRACTABLE: ICD-10-CM

## 2021-11-18 DIAGNOSIS — F41.8 DEPRESSION WITH ANXIETY: ICD-10-CM

## 2021-11-18 DIAGNOSIS — R51.9 NONINTRACTABLE HEADACHE, UNSPECIFIED CHRONICITY PATTERN, UNSPECIFIED HEADACHE TYPE: ICD-10-CM

## 2021-11-18 DIAGNOSIS — E61.1 IRON DEFICIENCY: Chronic | ICD-10-CM

## 2021-11-18 RX ORDER — IRON POLYSACCHARIDE COMPLEX 150 MG
150 CAPSULE ORAL DAILY
Qty: 90 CAPSULE | Refills: 1 | Status: SHIPPED | OUTPATIENT
Start: 2021-11-18 | End: 2021-12-28 | Stop reason: SDUPTHER

## 2021-11-18 RX ORDER — LORAZEPAM 0.5 MG/1
0.5 TABLET ORAL DAILY PRN
Qty: 30 TABLET | Refills: 0 | Status: SHIPPED | OUTPATIENT
Start: 2021-11-18 | End: 2022-06-21

## 2021-11-18 RX ORDER — TOPIRAMATE 25 MG/1
TABLET ORAL
Qty: 90 TABLET | Refills: 3 | Status: SHIPPED | OUTPATIENT
Start: 2021-11-18 | End: 2021-11-30 | Stop reason: SDUPTHER

## 2021-11-18 RX ORDER — TRAMADOL HYDROCHLORIDE 50 MG/1
50 TABLET ORAL 2 TIMES DAILY PRN
Qty: 30 TABLET | Refills: 0 | Status: SHIPPED | OUTPATIENT
Start: 2021-11-18 | End: 2021-12-29 | Stop reason: SDUPTHER

## 2021-11-30 ENCOUNTER — IMMUNIZATIONS (OUTPATIENT)
Dept: FAMILY MEDICINE CLINIC | Facility: CLINIC | Age: 46
End: 2021-11-30
Payer: COMMERCIAL

## 2021-11-30 VITALS — TEMPERATURE: 97.4 F

## 2021-11-30 DIAGNOSIS — G43.709 CHRONIC MIGRAINE WITHOUT AURA WITHOUT STATUS MIGRAINOSUS, NOT INTRACTABLE: ICD-10-CM

## 2021-11-30 DIAGNOSIS — Z23 FLU VACCINE NEED: Primary | ICD-10-CM

## 2021-11-30 PROCEDURE — 90471 IMMUNIZATION ADMIN: CPT

## 2021-11-30 PROCEDURE — 90686 IIV4 VACC NO PRSV 0.5 ML IM: CPT

## 2021-11-30 RX ORDER — TOPIRAMATE 100 MG/1
TABLET, FILM COATED ORAL
Qty: 90 TABLET | Refills: 1 | Status: SHIPPED | OUTPATIENT
Start: 2021-11-30 | End: 2022-03-24 | Stop reason: SDUPTHER

## 2021-12-06 ENCOUNTER — TELEPHONE (OUTPATIENT)
Dept: FAMILY MEDICINE CLINIC | Facility: CLINIC | Age: 46
End: 2021-12-06

## 2021-12-06 DIAGNOSIS — R51.9 NONINTRACTABLE HEADACHE, UNSPECIFIED CHRONICITY PATTERN, UNSPECIFIED HEADACHE TYPE: ICD-10-CM

## 2021-12-06 RX ORDER — SUMATRIPTAN 100 MG/1
TABLET, FILM COATED ORAL
Qty: 12 TABLET | Refills: 3 | Status: SHIPPED | OUTPATIENT
Start: 2021-12-06 | End: 2022-06-21

## 2021-12-06 RX ORDER — TRAMADOL HYDROCHLORIDE 50 MG/1
50 TABLET ORAL 2 TIMES DAILY PRN
Qty: 30 TABLET | Refills: 0 | OUTPATIENT
Start: 2021-12-06

## 2021-12-06 RX ORDER — NALOXONE HYDROCHLORIDE 4 MG/.1ML
SPRAY NASAL
Qty: 1 EACH | Refills: 1 | OUTPATIENT
Start: 2021-12-06

## 2021-12-14 PROCEDURE — 87636 SARSCOV2 & INF A&B AMP PRB: CPT | Performed by: FAMILY MEDICINE

## 2021-12-28 DIAGNOSIS — E61.1 IRON DEFICIENCY: Chronic | ICD-10-CM

## 2021-12-29 DIAGNOSIS — R51.9 NONINTRACTABLE HEADACHE, UNSPECIFIED CHRONICITY PATTERN, UNSPECIFIED HEADACHE TYPE: ICD-10-CM

## 2021-12-29 RX ORDER — TRAMADOL HYDROCHLORIDE 50 MG/1
50 TABLET ORAL 2 TIMES DAILY PRN
Qty: 30 TABLET | Refills: 0 | Status: SHIPPED | OUTPATIENT
Start: 2021-12-29 | End: 2022-01-29 | Stop reason: SDUPTHER

## 2021-12-29 RX ORDER — IRON POLYSACCHARIDE COMPLEX 150 MG
150 CAPSULE ORAL DAILY
Qty: 90 CAPSULE | Refills: 3 | Status: SHIPPED | OUTPATIENT
Start: 2021-12-29 | End: 2022-06-21 | Stop reason: SDUPTHER

## 2022-01-14 ENCOUNTER — TELEMEDICINE (OUTPATIENT)
Dept: FAMILY MEDICINE CLINIC | Facility: CLINIC | Age: 47
End: 2022-01-14
Payer: COMMERCIAL

## 2022-01-14 VITALS
WEIGHT: 204 LBS | OXYGEN SATURATION: 97 % | HEIGHT: 64 IN | HEART RATE: 111 BPM | TEMPERATURE: 97.7 F | BODY MASS INDEX: 34.83 KG/M2

## 2022-01-14 DIAGNOSIS — J01.90 ACUTE NON-RECURRENT SINUSITIS, UNSPECIFIED LOCATION: Primary | ICD-10-CM

## 2022-01-14 DIAGNOSIS — E66.9 OBESITY (BMI 30-39.9): ICD-10-CM

## 2022-01-14 DIAGNOSIS — M26.622 TMJ TENDERNESS, LEFT: ICD-10-CM

## 2022-01-14 PROCEDURE — 1036F TOBACCO NON-USER: CPT | Performed by: FAMILY MEDICINE

## 2022-01-14 PROCEDURE — 3008F BODY MASS INDEX DOCD: CPT | Performed by: FAMILY MEDICINE

## 2022-01-14 PROCEDURE — 99213 OFFICE O/P EST LOW 20 MIN: CPT | Performed by: FAMILY MEDICINE

## 2022-01-14 RX ORDER — METHYLPREDNISOLONE 4 MG/1
TABLET ORAL
Qty: 21 EACH | Refills: 0 | Status: SHIPPED | OUTPATIENT
Start: 2022-01-14 | End: 2022-03-08

## 2022-01-14 RX ORDER — AMOXICILLIN 875 MG/1
875 TABLET, COATED ORAL 2 TIMES DAILY
Qty: 20 TABLET | Refills: 0 | Status: SHIPPED | OUTPATIENT
Start: 2022-01-14 | End: 2022-01-24

## 2022-01-14 NOTE — PROGRESS NOTES
Virtual Regular Visit    Verification of patient location:    Patient is located in the following state in which I hold an active license [place]      Assessment/Plan:    Problem List Items Addressed This Visit        Other    Obesity (BMI 30-39  9)      Other Visit Diagnoses     Acute non-recurrent sinusitis, unspecified location    -  Primary    Relevant Medications    amoxicillin (AMOXIL) 875 mg tablet    TMJ tenderness, left        Relevant Medications    methylPREDNISolone 4 MG tablet therapy pack      Patient presents for evaluation of URI symptoms  Recent personal history of COVID-19 diagnosed on December 14th and recovered with no residual symptoms  She developed symptoms of sore throat, postnasal drip and sinus congestion within past 2 days  Start amoxicillin 875 mg b i d  for the next 7 to 10 days  Gargle, fluids  Patient reports intermittent pain in her left TMJ  We discussed importance of soft diet  Patient should schedule follow-up with her dentist and discuss possibility of mouth guard use  If her symptoms will not improve on soft diet-she will add Medrol Dosepak  Patient reports that she has been doing very well on Topamax  Current dose is 100 mg daily  She lost 26 lb  Headaches are improving in frequency and severity  Reason for visit is   Chief Complaint   Patient presents with    Nasal Congestion     New since having covid last month symptoms began Wednesday     Sore Throat    Post Nasal Drip    Virtual Regular Visit        Encounter provider Karl Mccann MD    Provider located at [address] [place] [phone]      Recent Visits  Date Type Provider Dept   01/14/22 Telemedicine Karl Mccann MD [id number] [facility] recent visits within past 7 days and meeting all other requirements  Future Appointments  No visits were found meeting these conditions    Showing future appointments within next 150 days and meeting all other requirements       The patient was identified by name and date of birth  Ashley Bone was informed that this is a telemedicine visit and that the visit is being conducted through [facility] and patient was informed this is a secure, HIPAA-complaint platform  She agrees to proceed     My office door was closed  No one else was in the room  She acknowledged consent and understanding of privacy and security of the video platform  The patient has agreed to participate and understands they can discontinue the visit at any time  Patient is aware this is a billable service  Anastacia Jay is a 40 y o  female    Stents for evaluation of URI symptoms  She is complaining of sore throat within past few days  Left side is more painful than right  She was concerned about appearance of white streaks/possible mucus  Patient denies symptoms of chest congestion but admits to mild cough, rhinorrhea and nasal congestion  Her smell and taste are normal   Patient with personal history of COVID-19 on December 14th  Patient is also complaining of intermittent symptoms of left TMJ pain  Symptoms started few weeks ago  Sore Throat   This is a new problem  The current episode started yesterday  The problem has been gradually worsening  The pain is worse on the left side  There has been no fever  The pain is at a severity of 7/10  Associated symptoms include congestion and ear pain  Pertinent negatives include no abdominal pain, coughing, diarrhea, drooling, ear discharge, headaches, hoarse voice, plugged ear sensation, neck pain, shortness of breath, stridor, swollen glands, trouble swallowing or vomiting  She has had no exposure to strep or mono  History reviewed  No pertinent past medical history      Past Surgical History:   Procedure Laterality Date    NO PAST SURGERIES         Current Outpatient Medications   Medication Sig Dispense Refill    iron polysaccharides (Ferrex 150) 150 mg capsule Take 1 capsule (150 mg total) by mouth daily 90 capsule 3    ketorolac (TORADOL) 10 mg tablet Take 1 tablet (10 mg total) by mouth daily as needed for moderate pain or severe pain (Headache) 10 tablet 0    LORazepam (ATIVAN) 0 5 mg tablet Take 1 tablet (0 5 mg total) by mouth daily as needed for anxiety 30 tablet 0    pantoprazole (PROTONIX) 40 mg tablet TAKE 1 TABLET DAILY 90 tablet 3    SUMAtriptan (Imitrex) 100 mg tablet Take 1 tablet once a day as needed for migraine headache 12 tablet 3    topiramate (TOPAMAX) 100 mg tablet TAKE 1 tab at bedtime 90 tablet 1    traMADol (ULTRAM) 50 mg tablet Take 1 tablet (50 mg total) by mouth 2 (two) times a day as needed for moderate pain (Migraine headache) 30 tablet 0    valACYclovir (VALTREX) 500 mg tablet Take 500 mg by mouth      venlafaxine (EFFEXOR-XR) 150 mg 24 hr capsule TAKE 1 CAPSULE BY MOUTH  DAILY 90 capsule 3    amoxicillin (AMOXIL) 875 mg tablet Take 1 tablet (875 mg total) by mouth 2 (two) times a day for 10 days 20 tablet 0    ergocalciferol (VITAMIN D2) 50,000 units Take 1 capsule (50,000 Units total) by mouth once a week (Patient not taking: Reported on 1/14/2022 ) 12 capsule 0    methylPREDNISolone 4 MG tablet therapy pack Use as directed on package 21 each 0    venlafaxine (EFFEXOR-XR) 150 mg 24 hr capsule Take 1 capsule (150 mg total) by mouth daily with breakfast 30 capsule 0     No current facility-administered medications for this visit  Allergies   Allergen Reactions    Clarithromycin Other (See Comments)     nausea    Sulfa Antibiotics GI Intolerance    Sulfamethoxazole-Trimethoprim Other (See Comments)     rash       Review of Systems   Constitutional: Negative for fatigue and fever  HENT: Positive for congestion, ear pain, postnasal drip and sore throat  Negative for drooling, ear discharge, hoarse voice and trouble swallowing           Left TMJ pain   Respiratory: Negative for cough, chest tightness, shortness of breath and stridor  Gastrointestinal: Negative for abdominal pain, diarrhea and vomiting  Musculoskeletal: Negative for neck pain  Neurological: Negative for headaches  Video Exam    Vitals:    01/14/22 0823   Pulse: (!) 111   Temp: 97 7 °F (36 5 °C)   TempSrc: Temporal   SpO2: 97%   Weight: 92 5 kg (204 lb)   Height: 5' 4" (1 626 m)       Physical Exam  Vitals and nursing note reviewed  Constitutional:       Appearance: Normal appearance  Pulmonary:      Comments: Unlabored breathing  No tachypnea, cough or wheezing throughout exam   Patient is able to complete full sentences without cough  Neurological:      General: No focal deficit present  Mental Status: She is alert and oriented to person, place, and time  I spent 12 minutes directly with the patient during this visit    VIRTUAL VISIT DISCLAIMER      Catracihta Lopez verbally agrees to participate in [employer]  Pt is aware that [employer] could be limited without vital signs or the ability to perform a full hands-on physical exam  Elvira Robledo understands she or the provider may request at any time to terminate the video visit and request the patient to seek care or treatment in person

## 2022-01-17 ENCOUNTER — TELEPHONE (OUTPATIENT)
Dept: FAMILY MEDICINE CLINIC | Facility: CLINIC | Age: 47
End: 2022-01-17

## 2022-01-29 ENCOUNTER — TELEPHONE (OUTPATIENT)
Dept: FAMILY MEDICINE CLINIC | Facility: CLINIC | Age: 47
End: 2022-01-29

## 2022-01-29 DIAGNOSIS — R51.9 NONINTRACTABLE HEADACHE, UNSPECIFIED CHRONICITY PATTERN, UNSPECIFIED HEADACHE TYPE: ICD-10-CM

## 2022-01-29 RX ORDER — TRAMADOL HYDROCHLORIDE 50 MG/1
50 TABLET ORAL 2 TIMES DAILY PRN
Qty: 30 TABLET | Refills: 0 | Status: SHIPPED | OUTPATIENT
Start: 2022-01-29 | End: 2022-02-23 | Stop reason: SDUPTHER

## 2022-01-29 NOTE — TELEPHONE ENCOUNTER
----- Message from Anne Winter LPN sent at 5/71/1869  9:03 AM EST -----  Regarding: FW: Refill  Rowdy has been removed,please fill medication    ----- Message -----  From: Amanda Sanchez  Sent: 1/26/2022   7:45 AM EST  To: [facility]  Subject: Refill                                           Please refill tramadol and remove Maria Luisamargo Valentine as someone you can speak with in my chart

## 2022-02-23 DIAGNOSIS — R51.9 NONINTRACTABLE HEADACHE, UNSPECIFIED CHRONICITY PATTERN, UNSPECIFIED HEADACHE TYPE: ICD-10-CM

## 2022-02-23 RX ORDER — TRAMADOL HYDROCHLORIDE 50 MG/1
50 TABLET ORAL 2 TIMES DAILY PRN
Qty: 30 TABLET | Refills: 0 | Status: SHIPPED | OUTPATIENT
Start: 2022-02-23 | End: 2022-03-08 | Stop reason: SDUPTHER

## 2022-03-08 ENCOUNTER — TELEPHONE (OUTPATIENT)
Dept: FAMILY MEDICINE CLINIC | Facility: CLINIC | Age: 47
End: 2022-03-08

## 2022-03-08 DIAGNOSIS — R51.9 NONINTRACTABLE HEADACHE, UNSPECIFIED CHRONICITY PATTERN, UNSPECIFIED HEADACHE TYPE: ICD-10-CM

## 2022-03-08 RX ORDER — TRAMADOL HYDROCHLORIDE 50 MG/1
50 TABLET ORAL 2 TIMES DAILY PRN
Qty: 30 TABLET | Refills: 0 | Status: SHIPPED | OUTPATIENT
Start: 2022-03-08 | End: 2022-03-24 | Stop reason: SDUPTHER

## 2022-03-08 RX ORDER — PREDNISONE 10 MG/1
TABLET ORAL
Qty: 20 TABLET | Refills: 0 | Status: SHIPPED | OUTPATIENT
Start: 2022-03-08 | End: 2022-06-21 | Stop reason: ALTCHOICE

## 2022-03-09 NOTE — TELEPHONE ENCOUNTER
----- Message from Jaqueline Kohli LPN sent at 9/6/2886  1:15 PM EST -----  Regarding: FW: Refill    ----- Message -----  From: Lisa Lezama  Sent: 3/8/2022  11:22 AM EST  To: [facility]  Subject: Refill                                           Please refill tramadol to [facility] today   I've had a very bad cycle and unfortunately my head is suffering   Thank you

## 2022-03-19 ENCOUNTER — IMMUNIZATIONS (OUTPATIENT)
Dept: FAMILY MEDICINE CLINIC | Facility: HOSPITAL | Age: 47
End: 2022-03-19

## 2022-03-19 PROCEDURE — 0051A COVID-19 PFIZER VACC TRIS-SUCROSE GRAY CAP 0.3 ML: CPT

## 2022-03-19 PROCEDURE — 91305 COVID-19 PFIZER VACC TRIS-SUCROSE GRAY CAP 0.3 ML: CPT

## 2022-03-24 DIAGNOSIS — R51.9 NONINTRACTABLE HEADACHE, UNSPECIFIED CHRONICITY PATTERN, UNSPECIFIED HEADACHE TYPE: ICD-10-CM

## 2022-03-25 ENCOUNTER — TELEPHONE (OUTPATIENT)
Dept: FAMILY MEDICINE CLINIC | Facility: CLINIC | Age: 47
End: 2022-03-25

## 2022-03-25 RX ORDER — TRAMADOL HYDROCHLORIDE 50 MG/1
50 TABLET ORAL 2 TIMES DAILY PRN
Qty: 30 TABLET | Refills: 0 | Status: SHIPPED | OUTPATIENT
Start: 2022-03-25 | End: 2022-04-20

## 2022-03-25 NOTE — TELEPHONE ENCOUNTER
----- Message from Gavin Prader sent at 3/25/2022  8:27 AM EDT -----  Regarding: Refill  Yes   I'm requesting a new refill please

## 2022-04-14 ENCOUNTER — TELEPHONE (OUTPATIENT)
Dept: FAMILY MEDICINE CLINIC | Facility: CLINIC | Age: 47
End: 2022-04-14

## 2022-04-14 NOTE — TELEPHONE ENCOUNTER
----- Message from Saba Fountain MD sent at 4/13/2022  5:40 PM EDT -----  Regarding: FW: Refill  Please schedule appointment for this patient  She should also proceed with MRI as we have discussed    Thank you  ----- Message -----  From: Wilma Trimble LPN  Sent: 3/74/8698   4:04 PM EDT  To: Saba Fountain MD  Subject: FW: Refill                                       Spoke with pt, she understands your response  We discussed a Neurology consult- willing to see neurologist  Pt states she has been having "daily migraines"  ----- Message -----  From: Wilma Trimble LPN  Sent: 5/62/9893   3:24 PM EDT  To: [facility]  Subject: FW: Refill                                       L/M for pt to return this call in person as per MD instructions to speak with pt directly over the phone    ----- Message -----  From: Saba Fountain MD  Sent: 4/12/2022   3:02 PM EDT  To: [facility]  Subject: FW: Refill                                       Please contact patient [address] "[employer]" PHONE and find out why she is calling for tramadol refill almost 2 weeks early  I had numerous discussions with her and she is aware that 30 tablets of tramadol should last her at least a month  Yes, she can take this medication twice a day but only as needed during migraines  Has she been taking tramadol twice a day for the last 15 to 16 days every day? IF SHE IS IN A FLARE OF MIGRAINE-THEN SHE SHOULD HAVE ALTERNATIVE THERAPY  Thank you  ----- Message -----  From: Wilma Trimble LPN  Sent: 8/31/4741   9:32 AM EDT  To: Saba Fountain MD  Subject: FW: Refill                                         ----- Message -----  From: Mylene Chaudhary  Sent: 4/12/2022   8:29 AM EDT  To: [facility]  Subject: Refill                                           Actually,  Dr Jamal Taveras has filled them 14 days apart for me  I'd like you to please check with her   They are to be taken twice daily as needed and come in [address]  [facility]

## 2022-04-20 DIAGNOSIS — R51.9 NONINTRACTABLE HEADACHE, UNSPECIFIED CHRONICITY PATTERN, UNSPECIFIED HEADACHE TYPE: ICD-10-CM

## 2022-04-20 RX ORDER — TRAMADOL HYDROCHLORIDE 50 MG/1
50 TABLET ORAL 2 TIMES DAILY PRN
Qty: 30 TABLET | Refills: 0 | Status: SHIPPED | OUTPATIENT
Start: 2022-04-20 | End: 2022-05-10 | Stop reason: SDUPTHER

## 2022-04-30 DIAGNOSIS — F41.8 DEPRESSION WITH ANXIETY: ICD-10-CM

## 2022-05-01 RX ORDER — VENLAFAXINE HYDROCHLORIDE 150 MG/1
CAPSULE, EXTENDED RELEASE ORAL
Qty: 90 CAPSULE | Refills: 3 | Status: SHIPPED | OUTPATIENT
Start: 2022-05-01

## 2022-05-10 DIAGNOSIS — R51.9 NONINTRACTABLE HEADACHE, UNSPECIFIED CHRONICITY PATTERN, UNSPECIFIED HEADACHE TYPE: ICD-10-CM

## 2022-05-10 RX ORDER — TRAMADOL HYDROCHLORIDE 50 MG/1
50 TABLET ORAL 2 TIMES DAILY PRN
Qty: 30 TABLET | Refills: 0 | Status: SHIPPED | OUTPATIENT
Start: 2022-05-10 | End: 2022-05-31 | Stop reason: SDUPTHER

## 2022-05-10 NOTE — TELEPHONE ENCOUNTER
----- Message from Purnima Wayne sent at 5/10/2022  7:37 AM EDT -----  Regarding: FW: Refill    ----- Message -----  From: Will Yang  Sent: 5/10/2022   7:06 AM EDT  To: [facility]  Subject: Refill                                           May I have a refill of tramadol today please

## 2022-05-31 ENCOUNTER — TELEPHONE (OUTPATIENT)
Dept: FAMILY MEDICINE CLINIC | Facility: CLINIC | Age: 47
End: 2022-05-31

## 2022-05-31 DIAGNOSIS — R51.9 NONINTRACTABLE HEADACHE, UNSPECIFIED CHRONICITY PATTERN, UNSPECIFIED HEADACHE TYPE: ICD-10-CM

## 2022-05-31 RX ORDER — TRAMADOL HYDROCHLORIDE 50 MG/1
50 TABLET ORAL 2 TIMES DAILY PRN
Qty: 10 TABLET | Refills: 0 | Status: SHIPPED | OUTPATIENT
Start: 2022-05-31 | End: 2022-06-21 | Stop reason: SDUPTHER

## 2022-05-31 NOTE — TELEPHONE ENCOUNTER
Pt returned call for triage  She is having more frequent headaches this month that she attributes to being hormonal  Declined an appt with [facility] sooner for acute headache pain and will keep scheduled appt with Dr Rivera  Advised pt that she still has not schedule an appt with [facility]  Declined to take phone number  Advised that she schedule as soon as possible due to appts being scheduled weeks out

## 2022-05-31 NOTE — TELEPHONE ENCOUNTER
----- Message from Sherrilyn Merlin sent at 5/31/2022  1:11 PM EDT -----  Regarding: Refill  Please refill tramadol      Thank you

## 2022-05-31 NOTE — TELEPHONE ENCOUNTER
L/M for pt to return this call for clarification  Tramadol was fill on 5/10/22 for 30 tabs  Pt also does not have appt schedule with Neurology  Neurology did contact pt x3 with no response

## 2022-06-01 NOTE — TELEPHONE ENCOUNTER
Dr Tamir Castorena is out of the office this week  I refilled prescription for 10 tablets, she will need to speak to Dr Tamir Castorena for future refills

## 2022-06-21 ENCOUNTER — OFFICE VISIT (OUTPATIENT)
Dept: FAMILY MEDICINE CLINIC | Facility: CLINIC | Age: 47
End: 2022-06-21
Payer: COMMERCIAL

## 2022-06-21 VITALS
SYSTOLIC BLOOD PRESSURE: 114 MMHG | HEART RATE: 85 BPM | TEMPERATURE: 97.5 F | BODY MASS INDEX: 33.12 KG/M2 | RESPIRATION RATE: 16 BRPM | DIASTOLIC BLOOD PRESSURE: 80 MMHG | WEIGHT: 194 LBS | OXYGEN SATURATION: 98 % | HEIGHT: 64 IN

## 2022-06-21 DIAGNOSIS — Z12.11 COLON CANCER SCREENING: ICD-10-CM

## 2022-06-21 DIAGNOSIS — G43.709 CHRONIC MIGRAINE WITHOUT AURA WITHOUT STATUS MIGRAINOSUS, NOT INTRACTABLE: ICD-10-CM

## 2022-06-21 DIAGNOSIS — E66.9 OBESITY (BMI 30-39.9): Chronic | ICD-10-CM

## 2022-06-21 DIAGNOSIS — F41.8 DEPRESSION WITH ANXIETY: ICD-10-CM

## 2022-06-21 DIAGNOSIS — Z00.00 ENCOUNTER FOR WELLNESS EXAMINATION IN ADULT: Primary | ICD-10-CM

## 2022-06-21 DIAGNOSIS — E61.1 IRON DEFICIENCY: Chronic | ICD-10-CM

## 2022-06-21 DIAGNOSIS — K21.9 CHRONIC GERD: ICD-10-CM

## 2022-06-21 DIAGNOSIS — R51.9 NONINTRACTABLE HEADACHE, UNSPECIFIED CHRONICITY PATTERN, UNSPECIFIED HEADACHE TYPE: ICD-10-CM

## 2022-06-21 DIAGNOSIS — Z12.31 ENCOUNTER FOR SCREENING MAMMOGRAM FOR BREAST CANCER: ICD-10-CM

## 2022-06-21 DIAGNOSIS — E78.5 DYSLIPIDEMIA: ICD-10-CM

## 2022-06-21 PROCEDURE — 3725F SCREEN DEPRESSION PERFORMED: CPT | Performed by: FAMILY MEDICINE

## 2022-06-21 PROCEDURE — 3008F BODY MASS INDEX DOCD: CPT | Performed by: FAMILY MEDICINE

## 2022-06-21 PROCEDURE — 1036F TOBACCO NON-USER: CPT | Performed by: FAMILY MEDICINE

## 2022-06-21 PROCEDURE — 99396 PREV VISIT EST AGE 40-64: CPT | Performed by: FAMILY MEDICINE

## 2022-06-21 RX ORDER — TOPIRAMATE 50 MG/1
50 TABLET, FILM COATED ORAL DAILY
Qty: 90 TABLET | Refills: 1 | Status: SHIPPED | OUTPATIENT
Start: 2022-06-21 | End: 2022-07-08 | Stop reason: SDUPTHER

## 2022-06-21 RX ORDER — IRON POLYSACCHARIDE COMPLEX 150 MG
150 CAPSULE ORAL DAILY
Qty: 90 CAPSULE | Refills: 3 | Status: SHIPPED | OUTPATIENT
Start: 2022-06-21

## 2022-06-21 RX ORDER — TRAMADOL HYDROCHLORIDE 50 MG/1
50 TABLET ORAL 2 TIMES DAILY PRN
Qty: 30 TABLET | Refills: 0 | Status: SHIPPED | OUTPATIENT
Start: 2022-06-21 | End: 2022-07-18 | Stop reason: SDUPTHER

## 2022-06-21 RX ORDER — PANTOPRAZOLE SODIUM 40 MG/1
40 TABLET, DELAYED RELEASE ORAL DAILY
Qty: 90 TABLET | Refills: 3 | Status: SHIPPED | OUTPATIENT
Start: 2022-06-21

## 2022-06-21 RX ORDER — RIZATRIPTAN BENZOATE 10 MG/1
TABLET, ORALLY DISINTEGRATING ORAL
Qty: 9 TABLET | Refills: 3 | Status: SHIPPED | OUTPATIENT
Start: 2022-06-21

## 2022-06-21 RX ORDER — METHYLPREDNISOLONE 4 MG/1
TABLET ORAL
Qty: 21 EACH | Refills: 1 | Status: SHIPPED | OUTPATIENT
Start: 2022-06-21

## 2022-06-21 RX ORDER — KETOROLAC TROMETHAMINE 10 MG/1
10 TABLET, FILM COATED ORAL DAILY PRN
Qty: 10 TABLET | Refills: 1 | Status: SHIPPED | OUTPATIENT
Start: 2022-06-21

## 2022-06-21 RX ORDER — ALPRAZOLAM 0.5 MG/1
0.5 TABLET ORAL 2 TIMES DAILY PRN
Qty: 30 TABLET | Refills: 2 | Status: SHIPPED | OUTPATIENT
Start: 2022-06-21

## 2022-06-21 NOTE — PROGRESS NOTES
FAMILY PRACTICE OFFICE VISIT       NAME: Jad Couch  AGE: 56 y o  SEX: female       : 1975        MRN: [id number]        Assessment and Plan     1  Encounter for wellness examination in adult    2  Chronic migraine without aura without status migrainosus, not intractable  Assessment & Plan:  Migraine headaches are occurring at the time of menses  Overall no change in pattern  Patient reports headaches have become less severe with start Topamax  She has been using tramadol for headache relieve as this medication has been historically helpful  We had numerous discussions in the past regarding proper use of abortive med regimen  I emphasized today again the patient should start her abortive regimen with Maxalt, and then as needed Toradol and only then utilize tramadol as a 3rd line therapy  Patient takes ibuprofen for regular tension headaches but it does not help with migraines  Patient did find regimen of prednisone to be effective for headache   I provided her with backup prescription for prednisone that she can use for most severe headaches  Dose of Toradol would be increased from 100 mg per day to 50 mg in the morning and 100 mg at night (150 mg daily)  I recommended brain MRI on numerous occasions, patient is very reluctant to proceed due to high insurance deductible and unchanged headache pattern  I wonder if she is a good candidate for Mirena, patient will discuss at her forthcoming appointment with gyn    Orders:  -     topiramate (Topamax) 50 MG tablet; Take 1 tablet (50 mg total) by mouth daily  -     rizatriptan (MAXALT-MLT) 10 mg disintegrating tablet; Take at the onset of migraine; if symptoms continue or return, may take another dose at least 2 hours after first dose  Take no more than 2 doses in a day  -     ketorolac (TORADOL) 10 mg tablet;  Take 1 tablet (10 mg total) by mouth daily as needed for moderate pain or severe pain (Headache)  -     methylPREDNISolone 4 MG tablet therapy pack; Use as directed on package  -     CBC and differential; Future  -     Comprehensive metabolic panel; Future  -     TSH, 3rd generation; Future    3  Iron deficiency  -     iron polysaccharides (Ferrex 150) 150 mg capsule; Take 1 capsule (150 mg total) by mouth daily  -     Ferritin; Future    4  Chronic GERD  -     pantoprazole (PROTONIX) 40 mg tablet; Take 1 tablet (40 mg total) by mouth daily    5  Dyslipidemia  -     CBC and differential; Future  -     Comprehensive metabolic panel; Future  -     TSH, 3rd generation; Future  -     Lipid Panel with Direct LDL reflex; Future    6  Encounter for screening mammogram for breast cancer  -     Mammo screening bilateral w 3d & cad; Future; Expected date: 06/21/2022    7  Colon cancer screening  -     Ambulatory referral for colonoscopy; Future    8  Depression with anxiety  Assessment & Plan:  Patient reports overall significant improvement of symptoms on Effexor  mg daily  She uses lorazepam infrequently as needed for anxiety, she request to switch it to p r n  alprazolam     Orders:  -     ALPRAZolam (XANAX) 0 5 mg tablet; Take 1 tablet (0 5 mg total) by mouth 2 (two) times a day as needed for anxiety    9  Nonintractable headache, unspecified chronicity pattern, unspecified headache type  -     traMADol (ULTRAM) 50 mg tablet; Take 1 tablet (50 mg total) by mouth 2 (two) times a day as needed for moderate pain (Migraine headache)    10  Obesity (BMI 30-39  9)  Assessment & Plan:  I commended patient on significant lifestyle changes  She 36 lb within past year    Continue with healthy diet, exercise, weight loss  Topamax has been extremely helpful in curbing her appetite  I advised patient to continue with high fluid/water intake          BMI Counseling: Body mass index is 33 3 kg/m²   The BMI is above normal  Nutrition recommendations include encouraging healthy choices of fruits and vegetables, consuming healthier snacks, moderation in carbohydrate intake, reducing intake of saturated and trans fat and reducing intake of cholesterol  Exercise recommendations include exercising 3-5 times per week  No pharmacotherapy was ordered  Patient referred to PCP  Rationale for BMI follow-up plan is due to patient being overweight or obese  Patient Instructions   Start Maxalt with onset of headache, you may add Toradol ( only 1 tab daily) ; please take both medications together if needed with caffeinated soda  If headache is not improving in 1 to 2 hours you may take tramadol  If headache is not improving in another 1 to 2 hours you may take 2nd Maxalt (max dose of  Maxalt  is 2 tabs in 24 hours)  If you are experiencing one of the most severe headaches, you may start prednisone taper ( medrol dose pack)  Topamax 50 mg am and 100 mg at bedtime  GYN appointment -discuss ? ? Mirena IUD  Mammography  Blood work  Colorectal screening is now advised at 39, if your insurance covers-please consider colonoscopy  Start vitD 3 OTC 2,000 IU daily        Return in about 1 year (around 6/21/2023) for Annual physical/well exam     Discussed with the patient and all questioned fully answered  She will call me if any problems arise  M*iDiDiD software was used to dictate this note  It may contain errors with dictating incorrect words/spelling  Please contact provider directly with any questions  Chief Complaint     Chief Complaint   Patient presents with    Physical Exam     Annual well exam       History of Present Illness     36 lb weight loss Migraines always aroun nmenses   Ibuprofen  Helps with regular HA    Sleeping  fine      Review of Systems   Review of Systems   Constitutional: Negative  Respiratory: Negative  Cardiovascular: Negative  Gastrointestinal: Negative  Endocrine: Negative  Genitourinary: Negative  Musculoskeletal: Negative  Skin: Negative  Allergic/Immunologic: Negative  Neurological: Positive for headaches  Hematological: Negative  Psychiatric/Behavioral: The patient is nervous/anxious  As per HPI       Active Problem List     Patient Active Problem List   Diagnosis    Depression with anxiety    Allergic rhinitis    Esophageal reflux    Headache, migraine    Obesity (BMI 30-39  9)    Vitamin D deficiency    Iron deficiency       Past Medical History:  History reviewed  No pertinent past medical history      Past Surgical History:  Past Surgical History:   Procedure Laterality Date    NO PAST SURGERIES         Family History:  Family History   Problem Relation Age of Onset    Diabetes Mother     Cerebral aneurysm Mother     Hypertension Mother     Heart attack Father 58    Hypertension Brother     Breast cancer Maternal Aunt        Social History:  Social History     Socioeconomic History    Marital status: /Civil Union     Spouse name: Not on file    Number of children: Not on file    Years of education: Not on file    Highest education level: Not on file   Occupational History    Not on file   Tobacco Use    Smoking status: Former Smoker    Smokeless tobacco: Never Used   Vaping Use    Vaping Use: Never used   Substance and Sexual Activity    Alcohol use: Yes     Comment: social    Drug use: No    Sexual activity: Not on file   Other Topics Concern    Not on file   Social History Narrative    Not on file     Social Determinants of Health     Financial Resource Strain: Not on file   Food Insecurity: Not on file   Transportation Needs: Not on file   Physical Activity: Not on file   Stress: Not on file   Social Connections: Not on file   Intimate Partner Violence: Not on file   Housing Stability: Not on file         Objective     Vitals:    06/21/22 1758   BP: 114/80   BP Location: Left arm   Patient Position: Sitting   Cuff Size: Large   Pulse: 85   Resp: 16   Temp: 97 5 °F (36 4 °C)   TempSrc: Temporal   SpO2: 98%   Weight: 88 kg (194 lb)   Height: 5' 4" (1 626 m)       Wt Readings from Last 3 Encounters:   06/21/22 88 kg (194 lb)   01/14/22 92 5 kg (204 lb)   03/19/21 104 kg (230 lb 4 oz)       Physical Exam  Vitals and nursing note reviewed  Constitutional:       General: She is not in acute distress  Appearance: Normal appearance  She is well-developed  She is not ill-appearing  HENT:      Head: Normocephalic and atraumatic  Eyes:      Conjunctiva/sclera: Conjunctivae normal    Neck:      Thyroid: No thyromegaly  Vascular: No carotid bruit  Cardiovascular:      Rate and Rhythm: Normal rate and regular rhythm  Heart sounds: Normal heart sounds  No murmur heard  Pulmonary:      Effort: Pulmonary effort is normal  No respiratory distress  Breath sounds: Normal breath sounds  No wheezing  Abdominal:      General: There is no distension or abdominal bruit  Tenderness: There is no abdominal tenderness  Hernia: No hernia is present  Musculoskeletal:         General: Normal range of motion  Cervical back: Neck supple  No rigidity  Right lower leg: No edema  Left lower leg: No edema  Skin:     General: Skin is warm  Neurological:      General: No focal deficit present  Mental Status: She is alert and oriented to person, place, and time  Cranial Nerves: No cranial nerve deficit  Coordination: Coordination normal    Psychiatric:         Mood and Affect: Mood normal          Behavior: Behavior normal          Thought Content:  Thought content normal           Pertinent Laboratory/Diagnostic Studies:    Lab Results   Component Value Date    WBC 5 0 04/16/2021    HGB 12 7 04/16/2021    HCT 36 5 04/16/2021    MCV 87 04/16/2021     04/16/2021       Lab Results   Component Value Date    TSH 1 350 04/16/2021       No results found for: CHOL  Lab Results   Component Value Date    TRIG 75 04/16/2021     Lab Results   Component Value Date    HDL 47 04/16/2021     Lab Results   Component Value Date    LDLCALC 106 (H) 04/16/2021     Lab Results   Component Value Date    HGBA1C 5 4 12/18/2019     Lab Results   Component Value Date    SODIUM 139 04/16/2021    K 3 9 04/16/2021     04/16/2021    CO2 18 (L) 04/16/2021    BUN 12 04/16/2021    CREATININE 0 90 04/16/2021    GLUC 85 04/16/2021    AST 10 04/16/2021    ALT 9 04/16/2021    TP 6 8 04/16/2021    TBILI 0 3 04/16/2021       Orders Placed This Encounter   Procedures    Mammo screening bilateral w 3d & cad    CBC and differential    Comprehensive metabolic panel    TSH, 3rd generation    Lipid Panel with Direct LDL reflex    Ferritin    Ambulatory referral for colonoscopy       ALLERGIES:  Allergies   Allergen Reactions    Clarithromycin Other (See Comments)     nausea    Sulfa Antibiotics GI Intolerance    Sulfamethoxazole-Trimethoprim Other (See Comments)     rash       Current Medications     Current Outpatient Medications   Medication Sig Dispense Refill    ALPRAZolam (XANAX) 0 5 mg tablet Take 1 tablet (0 5 mg total) by mouth 2 (two) times a day as needed for anxiety 30 tablet 2    iron polysaccharides (Ferrex 150) 150 mg capsule Take 1 capsule (150 mg total) by mouth daily 90 capsule 3    ketorolac (TORADOL) 10 mg tablet Take 1 tablet (10 mg total) by mouth daily as needed for moderate pain or severe pain (Headache) 10 tablet 1    methylPREDNISolone 4 MG tablet therapy pack Use as directed on package 21 each 1    pantoprazole (PROTONIX) 40 mg tablet Take 1 tablet (40 mg total) by mouth daily 90 tablet 3    rizatriptan (MAXALT-MLT) 10 mg disintegrating tablet Take at the onset of migraine; if symptoms continue or return, may take another dose at least 2 hours after first dose  Take no more than 2 doses in a day   9 tablet 3    topiramate (TOPAMAX) 100 mg tablet TAKE 1 tab at bedtime 90 tablet 3    topiramate (Topamax) 50 MG tablet Take 1 tablet (50 mg total) by mouth daily 90 tablet 1    traMADol (ULTRAM) 50 mg tablet Take 1 tablet (50 mg total) by mouth 2 (two) times a day as needed for moderate pain (Migraine headache) 30 tablet 0    valACYclovir (VALTREX) 500 mg tablet Take 500 mg by mouth      venlafaxine (EFFEXOR-XR) 150 mg 24 hr capsule TAKE 1 CAPSULE BY MOUTH  DAILY 90 capsule 3     No current facility-administered medications for this visit         Medications Discontinued During This Encounter   Medication Reason    predniSONE 10 mg tablet Therapy completed    venlafaxine (EFFEXOR-XR) 150 mg 24 hr capsule     SUMAtriptan (Imitrex) 100 mg tablet     ketorolac (TORADOL) 10 mg tablet Reorder    iron polysaccharides (Ferrex 150) 150 mg capsule Reorder    pantoprazole (PROTONIX) 40 mg tablet Reorder    ergocalciferol (VITAMIN D2) 50,000 units     LORazepam (ATIVAN) 0 5 mg tablet     traMADol (ULTRAM) 50 mg tablet Reorder       Health Maintenance     Health Maintenance   Topic Date Due    Hepatitis C Screening  Never done    DTaP,Tdap,and Td Vaccines (1 - Tdap) Never done    Colorectal Cancer Screening  Never done    Breast Cancer Screening: Mammogram  03/04/2022    Cervical Cancer Screening  02/22/2023    BMI: Adult  06/21/2023    Annual Physical  06/21/2023    BMI: Followup Plan  06/26/2023    HIV Screening  Completed    Influenza Vaccine  Completed    COVID-19 Vaccine  Completed    Pneumococcal Vaccine: Pediatrics (0 to 5 Years) and At-Risk Patients (6 to 59 Years)  Aged Out    HIB Vaccine  Aged Out    Hepatitis B Vaccine  Aged Out    IPV Vaccine  Aged Out    Hepatitis A Vaccine  Aged Out    Meningococcal ACWY Vaccine  Aged Out    HPV Vaccine  Aged [employer] History   Administered Date(s) Administered    COVID-19 PFIZER VACCINE 0 3 ML IM 01/10/2021, 01/29/2021    COVID-19 Pfizer vac (Akira-sucrose, gray cap) 12 yr+ IM 03/19/2022    INFLUENZA 10/16/2015, 10/31/2018, 10/05/2019, 11/08/2020    Influenza, injectable, quadrivalent, preservative free 0 5 mL 11/30/2021    Influenza, seasonal, injectable 10/06/2016 Margie Wells MD

## 2022-06-21 NOTE — PATIENT INSTRUCTIONS
Start Maxalt with onset of headache, you may add Toradol ( only 1 tab daily) ; please take both medications together if needed with caffeinated soda  If headache is not improving in 1 to 2 hours you may take tramadol  If headache is not improving in another 1 to 2 hours you may take 2nd Maxalt (max dose of  Maxalt  is 2 tabs in 24 hours)  If you are experiencing one of the most severe headaches, you may start prednisone taper ( medrol dose pack)  Topamax 50 mg am and 100 mg at bedtime  GYN appointment -discuss ? ?  Mirena IUD  Mammography  Blood work  Colorectal screening is now advised at 39, if your insurance covers-please consider colonoscopy  Start vitD 3 OTC 2,000 IU daily

## 2022-06-26 PROBLEM — E66.9 OBESITY (BMI 30-39.9): Chronic | Status: ACTIVE | Noted: 2017-07-29

## 2022-06-26 PROBLEM — F41.8 DEPRESSION WITH ANXIETY: Chronic | Status: ACTIVE | Noted: 2017-07-29

## 2022-06-27 NOTE — ASSESSMENT & PLAN NOTE
I commended patient on significant lifestyle changes      She 36 lb within past year    Continue with healthy diet, exercise, weight loss  Topamax has been extremely helpful in curbing her appetite  I advised patient to continue with high fluid/water intake

## 2022-06-27 NOTE — ASSESSMENT & PLAN NOTE
· Migraine headaches are occurring at the time of menses  · Overall no change in pattern  · Patient reports headaches have become less severe with start Topamax  · She has been using tramadol for headache relieve as this medication has been historically helpful  We had numerous discussions in the past regarding proper use of abortive med regimen  I emphasized today again the patient should start her abortive regimen with Maxalt, and then as needed Toradol and only then utilize tramadol as a 3rd line therapy  Patient takes ibuprofen for regular tension headaches but it does not help with migraines  · Patient did find regimen of prednisone to be effective for headache   I provided her with backup prescription for prednisone that she can use for most severe headaches  · Dose of Toradol would be increased from 100 mg per day to 50 mg in the morning and 100 mg at night (150 mg daily)  · I recommended brain MRI on numerous occasions, patient is very reluctant to proceed due to high insurance deductible and unchanged headache pattern    · I wonder if she is a good candidate for Mirena, patient will discuss at her forthcoming appointment with gyn

## 2022-06-27 NOTE — ASSESSMENT & PLAN NOTE
Patient reports overall significant improvement of symptoms on Effexor  mg daily      She uses lorazepam infrequently as needed for anxiety, she request to switch it to p r n  alprazolam

## 2022-07-08 DIAGNOSIS — G43.709 CHRONIC MIGRAINE WITHOUT AURA WITHOUT STATUS MIGRAINOSUS, NOT INTRACTABLE: ICD-10-CM

## 2022-07-08 RX ORDER — TOPIRAMATE 50 MG/1
50 TABLET, FILM COATED ORAL DAILY
Qty: 90 TABLET | Refills: 0 | Status: SHIPPED | OUTPATIENT
Start: 2022-07-08

## 2022-07-18 DIAGNOSIS — Z12.31 ENCOUNTER FOR SCREENING MAMMOGRAM FOR BREAST CANCER: ICD-10-CM

## 2022-07-18 DIAGNOSIS — R51.9 NONINTRACTABLE HEADACHE, UNSPECIFIED CHRONICITY PATTERN, UNSPECIFIED HEADACHE TYPE: ICD-10-CM

## 2022-07-18 RX ORDER — TRAMADOL HYDROCHLORIDE 50 MG/1
50 TABLET ORAL 2 TIMES DAILY PRN
Qty: 30 TABLET | Refills: 0 | Status: SHIPPED | OUTPATIENT
Start: 2022-07-18 | End: 2022-08-08 | Stop reason: SDUPTHER

## 2022-07-27 NOTE — TELEPHONE ENCOUNTER
I will send prescription for Keflex to the pharmacy pending results of urine culture    Thank you
Patient called stating she saw an urologist and they did absolutely nothing for her  She is calling stating when she urinates she has burning, and groin pain going up towards her bladder  She does not want to go to the ER for this and she doesn't want to be in pain the whole weekend  She want's to know if you would call in an antibiotic for her to [facility] [address]  Please advise patient at [phone] 
Pt aware
no

## 2022-07-28 LAB
ALBUMIN SERPL-MCNC: 4.2 G/DL (ref 3.8–4.8)
ALBUMIN/GLOB SERPL: 1.8 {RATIO} (ref 1.2–2.2)
ALP SERPL-CCNC: 54 IU/L (ref 44–121)
ALT SERPL-CCNC: 7 IU/L (ref 0–32)
AST SERPL-CCNC: 8 IU/L (ref 0–40)
BASOPHILS # BLD AUTO: 0 X10E3/UL (ref 0–0.2)
BASOPHILS NFR BLD AUTO: 1 %
BILIRUB SERPL-MCNC: 0.4 MG/DL (ref 0–1.2)
BUN SERPL-MCNC: 16 MG/DL (ref 6–24)
BUN/CREAT SERPL: 18 (ref 9–23)
CALCIUM SERPL-MCNC: 9.3 MG/DL (ref 8.7–10.2)
CHLORIDE SERPL-SCNC: 109 MMOL/L (ref 96–106)
CHOLEST SERPL-MCNC: 180 MG/DL (ref 100–199)
CO2 SERPL-SCNC: 18 MMOL/L (ref 20–29)
CREAT SERPL-MCNC: 0.9 MG/DL (ref 0.57–1)
EGFR: 80 ML/MIN/1.73
EOSINOPHIL # BLD AUTO: 0.2 X10E3/UL (ref 0–0.4)
EOSINOPHIL NFR BLD AUTO: 3 %
ERYTHROCYTE [DISTWIDTH] IN BLOOD BY AUTOMATED COUNT: 12.5 % (ref 11.7–15.4)
FERRITIN SERPL-MCNC: 28 NG/ML (ref 15–150)
GLOBULIN SER-MCNC: 2.3 G/DL (ref 1.5–4.5)
GLUCOSE SERPL-MCNC: 86 MG/DL (ref 65–99)
HCT VFR BLD AUTO: 41 % (ref 34–46.6)
HDLC SERPL-MCNC: 50 MG/DL
HGB BLD-MCNC: 13.7 G/DL (ref 11.1–15.9)
IMM GRANULOCYTES # BLD: 0 X10E3/UL (ref 0–0.1)
IMM GRANULOCYTES NFR BLD: 0 %
LDLC SERPL CALC-MCNC: 111 MG/DL (ref 0–99)
LYMPHOCYTES # BLD AUTO: 1.9 X10E3/UL (ref 0.7–3.1)
LYMPHOCYTES NFR BLD AUTO: 38 %
MCH RBC QN AUTO: 31.1 PG (ref 26.6–33)
MCHC RBC AUTO-ENTMCNC: 33.4 G/DL (ref 31.5–35.7)
MCV RBC AUTO: 93 FL (ref 79–97)
MONOCYTES # BLD AUTO: 0.4 X10E3/UL (ref 0.1–0.9)
MONOCYTES NFR BLD AUTO: 9 %
NEUTROPHILS # BLD AUTO: 2.4 X10E3/UL (ref 1.4–7)
NEUTROPHILS NFR BLD AUTO: 49 %
PLATELET # BLD AUTO: 299 X10E3/UL (ref 150–450)
POTASSIUM SERPL-SCNC: 4.2 MMOL/L (ref 3.5–5.2)
PROT SERPL-MCNC: 6.5 G/DL (ref 6–8.5)
RBC # BLD AUTO: 4.41 X10E6/UL (ref 3.77–5.28)
SODIUM SERPL-SCNC: 141 MMOL/L (ref 134–144)
TRIGL SERPL-MCNC: 103 MG/DL (ref 0–149)
TSH SERPL DL<=0.005 MIU/L-ACNC: 2.3 UIU/ML (ref 0.45–4.5)
WBC # BLD AUTO: 5 X10E3/UL (ref 3.4–10.8)

## 2022-08-08 ENCOUNTER — PATIENT MESSAGE (OUTPATIENT)
Dept: FAMILY MEDICINE CLINIC | Facility: CLINIC | Age: 47
End: 2022-08-08

## 2022-08-08 DIAGNOSIS — R51.9 NONINTRACTABLE HEADACHE, UNSPECIFIED CHRONICITY PATTERN, UNSPECIFIED HEADACHE TYPE: ICD-10-CM

## 2022-08-09 RX ORDER — TRAMADOL HYDROCHLORIDE 50 MG/1
50 TABLET ORAL 2 TIMES DAILY PRN
Qty: 30 TABLET | Refills: 0 | Status: SHIPPED | OUTPATIENT
Start: 2022-08-09 | End: 2022-08-31 | Stop reason: SDUPTHER

## 2022-08-30 DIAGNOSIS — R51.9 NONINTRACTABLE HEADACHE, UNSPECIFIED CHRONICITY PATTERN, UNSPECIFIED HEADACHE TYPE: ICD-10-CM

## 2022-08-30 RX ORDER — TRAMADOL HYDROCHLORIDE 50 MG/1
50 TABLET ORAL 2 TIMES DAILY PRN
Qty: 30 TABLET | Refills: 0 | Status: CANCELLED | OUTPATIENT
Start: 2022-08-30

## 2022-08-31 ENCOUNTER — TELEPHONE (OUTPATIENT)
Dept: FAMILY MEDICINE CLINIC | Facility: CLINIC | Age: 47
End: 2022-08-31

## 2022-08-31 DIAGNOSIS — R51.9 NONINTRACTABLE HEADACHE, UNSPECIFIED CHRONICITY PATTERN, UNSPECIFIED HEADACHE TYPE: ICD-10-CM

## 2022-08-31 RX ORDER — TRAMADOL HYDROCHLORIDE 50 MG/1
50 TABLET ORAL 2 TIMES DAILY PRN
Qty: 30 TABLET | Refills: 0 | Status: SHIPPED | OUTPATIENT
Start: 2022-08-31 | End: 2022-10-19

## 2022-08-31 NOTE — TELEPHONE ENCOUNTER
----- Message from Sofía Young LPN sent at 4/34/0096  3:28 PM EDT -----  Regarding: FW: Refill    ----- Message -----  From: Adela Thompson  Sent: 8/31/2022   8:39 AM EDT  To: [facility] Clinical  Subject: Refill                                           Good morning Dr Prince Gray  I have tried all the alternatives you have given and am really struggling with this migraine  I would greatly appreciate if you could please send in refill of the tramadol to [facility]

## 2022-09-19 ENCOUNTER — TELEPHONE (OUTPATIENT)
Dept: FAMILY MEDICINE CLINIC | Facility: CLINIC | Age: 47
End: 2022-09-19

## 2022-09-19 DIAGNOSIS — G43.709 CHRONIC MIGRAINE WITHOUT AURA WITHOUT STATUS MIGRAINOSUS, NOT INTRACTABLE: Primary | ICD-10-CM

## 2022-09-19 RX ORDER — PREDNISONE 10 MG/1
TABLET ORAL
Qty: 20 TABLET | Refills: 0 | Status: SHIPPED | OUTPATIENT
Start: 2022-09-19

## 2022-09-19 NOTE — TELEPHONE ENCOUNTER
Please triage this message  This patient refer to menstrual cramping? Her tramadol was just refilled on August 30 th  I can prescribe prednisone taper for migraine headache but it will likely not help with her menstrual cramping  Sorry    She should contact her gynecologist for further advise  Please let me know    Thank you

## 2022-09-19 NOTE — TELEPHONE ENCOUNTER
Please contact patient  Her migraines continue to be very frequent and quite bothersome in spite of every possible medication that I can prescribe  We cannot continue relying on a pain killer ( tramadol)  with every migraine attack  I discussed this  with patient on numerous occasions  I will send prescription for prednisone taper  To the pharmacy, usually it  aborts migraine headache  Patient should proceed with brain MRI and schedule consult with Neurology  Order placed    Referral is in epic    Thank you

## 2022-09-19 NOTE — TELEPHONE ENCOUNTER
Spoke with patient and relayed providers recommendations  She acknowledged that she needs to contact her gynecologist in regards to cramping  Patient stated she has had a migraine for the entirety of her menstrual cycle   Patient is still requesting that a refill of Tramadol be sent to pharmacy as she stated it is "the only thing that works "

## 2022-09-19 NOTE — TELEPHONE ENCOUNTER
----- Message from Judith Candelario, [facility] sent at 9/19/2022  7:47 AM EDT -----  Regarding: FW: Medicine     ----- Message -----  From: Mónica Garcia  Sent: 9/19/2022   7:44 AM EDT  To: [facility]  Subject: Medicine                                         Good morning Dr Canelo Murdock   Is there something you can recommend for severe cramps? I've been taking 800mg of ibuprofen and it's not helping  Also along with this I'm taking all remedies for my migraine and they aren't working  I was hoping you could refill tramadol to [facility]  This has me in bed, that I can barely move

## 2022-09-20 ENCOUNTER — TELEPHONE (OUTPATIENT)
Dept: FAMILY MEDICINE CLINIC | Facility: CLINIC | Age: 47
End: 2022-09-20

## 2022-09-20 DIAGNOSIS — G43.709 CHRONIC MIGRAINE WITHOUT AURA WITHOUT STATUS MIGRAINOSUS, NOT INTRACTABLE: Primary | ICD-10-CM

## 2022-09-20 RX ORDER — TRAMADOL HYDROCHLORIDE 50 MG/1
50 TABLET ORAL 2 TIMES DAILY PRN
Qty: 15 TABLET | Refills: 0 | Status: SHIPPED | OUTPATIENT
Start: 2022-09-20

## 2022-09-20 NOTE — TELEPHONE ENCOUNTER
----- Message from Juniorluis alberto Degroot sent at 9/20/2022 11:49 AM EDT -----  Regarding: FW: Migraine    ----- Message -----  From: Roderick Cruz  Sent: 9/20/2022  11:19 AM EDT  To: Kylie Choudhary [facility] Clinical  Subject: Migraine                                         Please request that Dr Darion Jaramillo fill even half (15) to get my thru this migraine and I will proceed with neurologist       Thank you

## 2022-10-18 DIAGNOSIS — R51.9 NONINTRACTABLE HEADACHE, UNSPECIFIED CHRONICITY PATTERN, UNSPECIFIED HEADACHE TYPE: ICD-10-CM

## 2022-10-19 RX ORDER — TRAMADOL HYDROCHLORIDE 50 MG/1
TABLET ORAL
Qty: 30 TABLET | Refills: 0 | Status: SHIPPED | OUTPATIENT
Start: 2022-10-19

## 2022-10-21 ENCOUNTER — TELEPHONE (OUTPATIENT)
Dept: FAMILY MEDICINE CLINIC | Facility: CLINIC | Age: 47
End: 2022-10-21

## 2022-10-21 NOTE — TELEPHONE ENCOUNTER
Patient with longstanding history of migraines  I would like her to proceed with neurology consultation and brain MRI for further evaluation  Patient is concerned about financial strain and high insurance deductible  I would appreciate if you could reach out and assist her in any way or matter        Thank you

## 2022-10-24 ENCOUNTER — PATIENT OUTREACH (OUTPATIENT)
Dept: FAMILY MEDICINE CLINIC | Facility: CLINIC | Age: 47
End: 2022-10-24

## 2022-10-24 DIAGNOSIS — Z59.89 INSURANCE COVERAGE PROBLEMS: Primary | ICD-10-CM

## 2022-10-24 SDOH — ECONOMIC STABILITY - INCOME SECURITY: OTHER PROBLEMS RELATED TO HOUSING AND ECONOMIC CIRCUMSTANCES: Z59.89

## 2022-10-24 NOTE — PROGRESS NOTES
Request received from PCP for Outpatient Social Work Care Manager ([facility]) to outreach patient and assist with cost of MRI as patient is concerned about financial strain and high insurance deductible  Patient needing MRI regarding migraines and PCP is recommending consultation with Neurology as well  Per chart review, patient has [employer] Personal Choice [phone]  Call placed to insurance to check cost/deductible for MRI and Neurology Specialist Appt  Rep stated patient's deductible is $1,500; patient has paid $99 52 towards deductible for the year  Patient's out of pocket (OOP) is $5,600 (once OOP is met, coinsurance/coverage is 100%); patient has paid $86 71 towards OOP for the year  Specialist copay is $0   Rep is unable to process estimated cost of MRI without code but states the cost would be full price to patient until deductible and OOP is met  Call placed to patient to discuss the above information, provide information for [facility], and refer to [address] [place] [address] Counselors for a [facility] pending eligibility  No answer, voicemail left, and awaiting return call

## 2022-10-31 ENCOUNTER — PATIENT OUTREACH (OUTPATIENT)
Dept: FAMILY MEDICINE CLINIC | Facility: CLINIC | Age: 47
End: 2022-10-31

## 2022-10-31 NOTE — PROGRESS NOTES
2nd outreach attempt to patient to discuss cost of MRI, what she has met towards her deductible, provide information on [facility] to run cost of testing, and inform about financial assistance that may be available through [address] pending patient's eligibility  No answer, voicemail left, and awaiting return call  Will mail unable to reach letter at this time and follow-up in roughly 2 weeks

## 2022-10-31 NOTE — LETTER
[address] [address]    Re: Josh Darron to Reach   11/1/2022       Dear Jenel Habermann am a [facility] [employer] [address] Work  and wanted to be certain you had information to contact me should you desire assistance with or have questions about non-medical aspects of your care such as [but not limited to] medical insurance, housing, transportation, material needs, or emergency needs, as I have been unable to reach you  If I do not have an answer I will assist you in finding the appropriate agency or individual who can help  Please feel free to contact me at [phone]  Thank You      Sincerely,         Nain Oquendo

## 2022-11-07 DIAGNOSIS — F41.8 DEPRESSION WITH ANXIETY: ICD-10-CM

## 2022-11-08 RX ORDER — ALPRAZOLAM 0.5 MG/1
TABLET ORAL
Qty: 30 TABLET | Refills: 0 | Status: SHIPPED | OUTPATIENT
Start: 2022-11-08

## 2022-11-14 ENCOUNTER — PATIENT OUTREACH (OUTPATIENT)
Dept: FAMILY MEDICINE CLINIC | Facility: CLINIC | Age: 47
End: 2022-11-14

## 2022-11-14 NOTE — PROGRESS NOTES
No return call received from patient since initial outreaches and mailing unable to reach letter  3rd outreach attempt to patient to discuss cost of MRI, what she has met towards her deductible, provide information on [facility] to run cost of testing, and inform about financial assistance that may be available through [address] pending patient's eligibility  No answer, voicemail left, and awaiting return call  Will close case at this time  Update routed to PCP

## 2022-12-27 DIAGNOSIS — G43.709 CHRONIC MIGRAINE WITHOUT AURA WITHOUT STATUS MIGRAINOSUS, NOT INTRACTABLE: ICD-10-CM

## 2022-12-27 RX ORDER — TOPIRAMATE 50 MG/1
50 TABLET, FILM COATED ORAL DAILY
Qty: 90 TABLET | Refills: 0 | Status: SHIPPED | OUTPATIENT
Start: 2022-12-27

## 2023-01-24 ENCOUNTER — TELEPHONE (OUTPATIENT)
Dept: FAMILY MEDICINE CLINIC | Facility: CLINIC | Age: 48
End: 2023-01-24

## 2023-01-24 DIAGNOSIS — G43.709 CHRONIC MIGRAINE WITHOUT AURA WITHOUT STATUS MIGRAINOSUS, NOT INTRACTABLE: ICD-10-CM

## 2023-01-24 RX ORDER — TRAMADOL HYDROCHLORIDE 50 MG/1
50 TABLET ORAL 2 TIMES DAILY PRN
Qty: 15 TABLET | Refills: 0 | OUTPATIENT
Start: 2023-01-24

## 2023-01-25 NOTE — TELEPHONE ENCOUNTER
L/M for pt to return this call, [employer] [facility] message sent as well  I need to speak with pt in person per Caryl Due

## 2023-01-25 NOTE — TELEPHONE ENCOUNTER
Please contact patient  I denied refill request for tramadol  I do not feel comfortable prescribing this medication for patient any longer  If patient would like to discuss medication management- she should set up up follow-up office visit      Thank you

## 2023-01-25 NOTE — TELEPHONE ENCOUNTER
----- Message from Hannah Cisneros LPN sent at 9/48/3444  2:24 PM EST -----  Regarding: FW: Refill  Contact: [phone]    ----- Message -----  From: Star Mares  Sent: 1/24/2023   2:24 PM EST  To: [facility] Clinical  Subject: Refill                                           Good afternoon  I got an automated denial for my medication of tramadol  Can you please let me know the status of this

## 2023-03-17 DIAGNOSIS — F10.10 ALCOHOL ABUSE: Primary | ICD-10-CM

## 2023-04-05 DIAGNOSIS — F41.8 DEPRESSION WITH ANXIETY: ICD-10-CM

## 2023-04-06 RX ORDER — VENLAFAXINE HYDROCHLORIDE 150 MG/1
CAPSULE, EXTENDED RELEASE ORAL
Qty: 90 CAPSULE | Refills: 0 | Status: SHIPPED | OUTPATIENT
Start: 2023-04-06

## 2023-06-29 DIAGNOSIS — F41.8 DEPRESSION WITH ANXIETY: ICD-10-CM

## 2023-06-30 RX ORDER — VENLAFAXINE HYDROCHLORIDE 150 MG/1
CAPSULE, EXTENDED RELEASE ORAL
Qty: 90 CAPSULE | Refills: 0 | Status: SHIPPED | OUTPATIENT
Start: 2023-06-30

## 2023-07-13 DIAGNOSIS — F10.10 ALCOHOL ABUSE: Primary | ICD-10-CM

## 2023-07-20 ENCOUNTER — PATIENT OUTREACH (OUTPATIENT)
Dept: FAMILY MEDICINE CLINIC | Facility: CLINIC | Age: 48
End: 2023-07-20

## 2023-07-20 DIAGNOSIS — F10.10 ALCOHOL ABUSE: Primary | ICD-10-CM

## 2023-07-20 DIAGNOSIS — Z12.31 SCREENING MAMMOGRAM FOR BREAST CANCER: ICD-10-CM

## 2023-07-20 NOTE — PROGRESS NOTES
Spoke with patient and made he aware of MAT office taking new patients again new referral will be placed and they will call her

## 2023-07-20 NOTE — PROGRESS NOTES
Spoke with [facility] office they just started taking new referrals. A new order needs to be placed please select Ambulatory referral to medication assisted Therapy MAT. REF [id number]. They will receive it in their workque and call patient They are only taking in house referrals at this time.

## 2023-08-01 ENCOUNTER — TELEPHONE (OUTPATIENT)
Dept: OTHER | Age: 48
End: 2023-08-01

## 2023-08-01 NOTE — TELEPHONE ENCOUNTER
Second time called regarding referral.  Left VM with MAT phone number provided. Referral closed - unable to contact patient.

## 2023-08-10 DIAGNOSIS — G43.709 CHRONIC MIGRAINE WITHOUT AURA WITHOUT STATUS MIGRAINOSUS, NOT INTRACTABLE: ICD-10-CM

## 2023-08-10 RX ORDER — TOPIRAMATE 50 MG/1
TABLET, FILM COATED ORAL
Qty: 90 TABLET | Refills: 3 | OUTPATIENT
Start: 2023-08-10

## 2023-08-10 RX ORDER — TOPIRAMATE 100 MG/1
TABLET, FILM COATED ORAL
Qty: 90 TABLET | Refills: 0 | Status: SHIPPED | OUTPATIENT
Start: 2023-08-10

## 2023-09-28 DIAGNOSIS — F41.8 DEPRESSION WITH ANXIETY: ICD-10-CM

## 2023-09-29 RX ORDER — VENLAFAXINE HYDROCHLORIDE 150 MG/1
CAPSULE, EXTENDED RELEASE ORAL
Qty: 90 CAPSULE | Refills: 0 | Status: SHIPPED | OUTPATIENT
Start: 2023-09-29

## 2023-10-03 ENCOUNTER — OFFICE VISIT (OUTPATIENT)
Dept: FAMILY MEDICINE CLINIC | Facility: CLINIC | Age: 48
End: 2023-10-03
Payer: COMMERCIAL

## 2023-10-03 VITALS
HEART RATE: 95 BPM | BODY MASS INDEX: 33.12 KG/M2 | TEMPERATURE: 97.8 F | WEIGHT: 194 LBS | SYSTOLIC BLOOD PRESSURE: 104 MMHG | HEIGHT: 64 IN | DIASTOLIC BLOOD PRESSURE: 68 MMHG | RESPIRATION RATE: 16 BRPM | OXYGEN SATURATION: 99 %

## 2023-10-03 DIAGNOSIS — E66.9 OBESITY (BMI 30-39.9): Chronic | ICD-10-CM

## 2023-10-03 DIAGNOSIS — F41.8 DEPRESSION WITH ANXIETY: Chronic | ICD-10-CM

## 2023-10-03 DIAGNOSIS — E61.1 IRON DEFICIENCY: Chronic | ICD-10-CM

## 2023-10-03 DIAGNOSIS — Z00.00 ENCOUNTER FOR WELLNESS EXAMINATION IN ADULT: Primary | ICD-10-CM

## 2023-10-03 DIAGNOSIS — Z23 INFLUENZA VACCINE NEEDED: ICD-10-CM

## 2023-10-03 DIAGNOSIS — Z12.11 COLON CANCER SCREENING: ICD-10-CM

## 2023-10-03 DIAGNOSIS — Z13.220 ENCOUNTER FOR SCREENING FOR LIPID DISORDER: ICD-10-CM

## 2023-10-03 DIAGNOSIS — G43.709 CHRONIC MIGRAINE WITHOUT AURA WITHOUT STATUS MIGRAINOSUS, NOT INTRACTABLE: ICD-10-CM

## 2023-10-03 PROCEDURE — 90471 IMMUNIZATION ADMIN: CPT

## 2023-10-03 PROCEDURE — 99396 PREV VISIT EST AGE 40-64: CPT | Performed by: FAMILY MEDICINE

## 2023-10-03 PROCEDURE — 90686 IIV4 VACC NO PRSV 0.5 ML IM: CPT

## 2023-10-03 RX ORDER — BUSPIRONE HYDROCHLORIDE 5 MG/1
TABLET ORAL
Qty: 120 TABLET | Refills: 1 | Status: SHIPPED | OUTPATIENT
Start: 2023-10-03

## 2023-10-03 NOTE — PATIENT INSTRUCTIONS
Please start BuSpar, 1 tablet twice a day, if needed dose could be increased to 2 tablets twice a day  If symptoms of stress/anxiety persist-please message me in a few weeks-we will consider increasing dose of Effexor by adding 37.5 mg daily  Please consider partial hospitalization/innovations program.  If you would like to enroll-please let me know  Please walk  Cologuard kit ordered  GYN OV

## 2023-10-03 NOTE — PROGRESS NOTES
Name: Nena Cervantes      : 1975      MRN: [id number]  Encounter Provider: Terri Hernandez MD  Encounter Date: 10/3/2023   Encounter department: [address] [place] [address]     1. Encounter for wellness examination in adult    2. Influenza vaccine needed  -     influenza vaccine, quadrivalent, 0.5 mL, preservative-free, for adult and pediatric patients 6 mos+ (AFLURIA, FLUARIX, FLULAVAL, FLUZONE)    3. Obesity (BMI 30-39.9)    4. Depression with anxiety  -     busPIRone (BUSPAR) 5 mg tablet; Take 1 tablet twice a day as needed for anxiety, if needed dose could be increased to 2 tablets twice a day    5. Chronic migraine without aura without status migrainosus, not intractable  -     CBC and differential; Future  -     Comprehensive metabolic panel; Future  -     TSH, 3rd generation; Future    6. Colon cancer screening  -     Cologuard    7. Iron deficiency  -     Iron Panel (Includes Ferritin, Iron Sat%, Iron, and TIBC); Future    8. Encounter for screening for lipid disorder  -     Lipid Panel with Direct LDL reflex; Future      BMI Counseling: Body mass index is 33.3 kg/m². The BMI is above normal. Nutrition recommendations include encouraging healthy choices of fruits and vegetables, consuming healthier snacks, moderation in carbohydrate intake, reducing intake of saturated and trans fat and reducing intake of cholesterol. Exercise recommendations include exercising 3-5 times per week. No pharmacotherapy was ordered. Patient referred to PCP. Rationale for BMI follow-up plan is due to patient being overweight or obese.         Patient Instructions   Please start BuSpar, 1 tablet twice a day, if needed dose could be increased to 2 tablets twice a day  If symptoms of stress/anxiety persist-please message me in a few weeks-we will consider increasing dose of Effexor by adding 37.5 mg daily  Please consider partial hospitalization/innovations program.  If you would like to enroll-please let me know  Please walk  Cologuard kit ordered  GYN OV        Subjective     Annual well exam.    Up-to-date with mammogram, July 2023  GYN: [facility], due    Most recent blood work performed in May 2023, results reviewed    Medications updated. Menses are regular   Patient has been sober for over 3 months  Under ongoing stress, , coping well. Support: aunt  She remains on Effexor 150 mg daily. She would like to try to augment her therapy if possible    Headaches have been well controlled      Review of Systems   Constitutional: Negative. HENT: Negative. Eyes: Negative. Respiratory: Negative. Cardiovascular: Negative. Gastrointestinal: Negative. Endocrine: Negative. Genitourinary: Negative. Negative for menstrual problem. Musculoskeletal: Negative. Allergic/Immunologic: Negative. Neurological: Negative. Negative for headaches. Psychiatric/Behavioral: Negative for sleep disturbance. The patient is nervous/anxious.          As per HPI       Past Medical History:   Diagnosis Date   • Anxiety    • Depression    • Headache(784.0)    • Obesity      Past Surgical History:   Procedure Laterality Date   • NO PAST SURGERIES       Family History   Problem Relation Age of Onset   • Diabetes Mother    • Cerebral aneurysm Mother    • Hypertension Mother    • Heart attack Father 58   • Hypertension Brother    • Breast cancer Maternal Aunt    • Substance Abuse Brother    • Thyroid disease Brother    • Breast cancer Maternal Aunt      Social History     Socioeconomic History   • Marital status: /Civil Union     Spouse name: None   • Number of children: None   • Years of education: None   • Highest education level: None   Occupational History   • None   Tobacco Use   • Smoking status: Former     Packs/day: 0.00     Years: 2.00     Total pack years: 0.00     Types: Cigarettes     Start date: 4/21/2018     Quit date: 3/30/2020     Years since quitting: 3.5   • Smokeless tobacco: Never   Vaping Use   • Vaping Use: Never used   Substance and Sexual Activity   • Alcohol use: Not Currently     Comment: Social   • Drug use: Not Currently     Types: Marijuana   • Sexual activity: Yes     Partners: Male     Birth control/protection: Other   Other Topics Concern   • None   Social History Narrative   • None     Social Determinants of Health     Financial Resource Strain: Not on file   Food Insecurity: Not on file   Transportation Needs: Not on file   Physical Activity: Not on file   Stress: Not on file   Social Connections: Not on file   Intimate Partner Violence: Not on file   Housing Stability: Not on file     Current Outpatient Medications on File Prior to Visit   Medication Sig   • ketorolac (TORADOL) 10 mg tablet Take 1 tablet (10 mg total) by mouth daily as needed for moderate pain or severe pain (Headache)   • pantoprazole (PROTONIX) 40 mg tablet Take 1 tablet (40 mg total) by mouth daily   • rizatriptan (MAXALT-MLT) 10 mg disintegrating tablet Take at the onset of migraine; if symptoms continue or return, may take another dose at least 2 hours after first dose. Take no more than 2 doses in a day.    • topiramate (TOPAMAX) 100 mg tablet TAKE 1 TABLET BY MOUTH AT  BEDTIME   • topiramate (TOPAMAX) 50 MG tablet TAKE 1 TABLET BY MOUTH DAILY   • valACYclovir (VALTREX) 500 mg tablet Take 500 mg by mouth   • venlafaxine (EFFEXOR-XR) 150 mg 24 hr capsule TAKE 1 CAPSULE BY MOUTH DAILY   • [DISCONTINUED] iron polysaccharides (Ferrex 150) 150 mg capsule Take 1 capsule (150 mg total) by mouth daily   • [DISCONTINUED] methylPREDNISolone 4 MG tablet therapy pack Use as directed on package (Patient not taking: Reported on 10/3/2023)   • [DISCONTINUED] predniSONE 10 mg tablet Take 40 mg (4 tabs) daily x 2 days; then 30 mg (3 tabs) daily x 2 days then 20 mg (2 tabs) daily x 2 days then 10 mg ( 1 tab) dailyx 2 days     Allergies   Allergen Reactions   • Clarithromycin Other (See Comments)     nausea • Sulfa Antibiotics GI Intolerance   • Sulfamethoxazole-Trimethoprim Other (See Comments)     rash     Immunization History   Administered Date(s) Administered   • COVID-19 PFIZER VACCINE 0.3 ML IM 01/10/2021, 01/29/2021   • COVID-19 Pfizer vac (Akira-sucrose, gray cap) 12 yr+ IM 03/19/2022   • INFLUENZA 10/16/2015, 10/31/2018, 10/05/2019, 11/08/2020, 11/30/2021   • Influenza, injectable, quadrivalent, preservative free 0.5 mL 11/30/2021, 10/03/2023   • Influenza, seasonal, injectable 10/06/2016       Objective     /68 (BP Location: Left arm, Cuff Size: Large)   Pulse 95   Temp 97.8 °F (36.6 °C) (Temporal)   Resp 16   Ht 5' 4" (1.626 m)   Wt 88 kg (194 lb)   SpO2 99%   BMI 33.30 kg/m²     Physical Exam  Vitals and nursing note reviewed. Constitutional:       General: She is not in acute distress. Appearance: Normal appearance. She is well-developed. She is not ill-appearing. HENT:      Head: Normocephalic and atraumatic. Eyes:      General: No scleral icterus. Conjunctiva/sclera: Conjunctivae normal.   Neck:      Thyroid: No thyromegaly. Vascular: No carotid bruit. Cardiovascular:      Rate and Rhythm: Normal rate and regular rhythm. Heart sounds: Normal heart sounds. No murmur heard. Pulmonary:      Effort: Pulmonary effort is normal. No respiratory distress. Breath sounds: Normal breath sounds. No wheezing. Abdominal:      General: Abdomen is flat. Bowel sounds are normal. There is no distension or abdominal bruit. Tenderness: There is no abdominal tenderness. Hernia: No hernia is present. Musculoskeletal:         General: Normal range of motion. Cervical back: Neck supple. No rigidity. Right lower leg: No edema. Left lower leg: No edema. Skin:     General: Skin is warm. Neurological:      General: No focal deficit present. Mental Status: She is alert and oriented to person, place, and time.       Cranial Nerves: No cranial nerve deficit. Coordination: Coordination normal.   Psychiatric:         Mood and Affect: Mood normal.         Behavior: Behavior normal.         Thought Content:  Thought content normal.       Christos Montes MD

## 2023-10-09 DIAGNOSIS — G43.709 CHRONIC MIGRAINE WITHOUT AURA WITHOUT STATUS MIGRAINOSUS, NOT INTRACTABLE: ICD-10-CM

## 2023-10-09 RX ORDER — TOPIRAMATE 50 MG/1
TABLET, FILM COATED ORAL
Qty: 90 TABLET | Refills: 3 | Status: SHIPPED | OUTPATIENT
Start: 2023-10-09

## 2023-10-27 DIAGNOSIS — B37.0 THRUSH: Primary | ICD-10-CM

## 2023-10-27 RX ORDER — CLOTRIMAZOLE 10 MG/1
10 LOZENGE ORAL; TOPICAL 4 TIMES DAILY
Qty: 40 TABLET | Refills: 0 | Status: SHIPPED | OUTPATIENT
Start: 2023-10-27 | End: 2023-11-06

## 2023-11-03 DIAGNOSIS — G43.709 CHRONIC MIGRAINE WITHOUT AURA WITHOUT STATUS MIGRAINOSUS, NOT INTRACTABLE: ICD-10-CM

## 2023-11-03 RX ORDER — TOPIRAMATE 100 MG/1
TABLET, FILM COATED ORAL
Qty: 90 TABLET | Refills: 3 | Status: SHIPPED | OUTPATIENT
Start: 2023-11-03

## 2023-12-03 LAB
ALBUMIN SERPL-MCNC: 4.3 G/DL (ref 3.9–4.9)
ALBUMIN/GLOB SERPL: 2 {RATIO} (ref 1.2–2.2)
ALP SERPL-CCNC: 63 IU/L (ref 44–121)
ALT SERPL-CCNC: 16 IU/L (ref 0–32)
AST SERPL-CCNC: 16 IU/L (ref 0–40)
BASOPHILS # BLD AUTO: 0.1 X10E3/UL (ref 0–0.2)
BASOPHILS NFR BLD AUTO: 1 %
BILIRUB SERPL-MCNC: 0.6 MG/DL (ref 0–1.2)
BUN SERPL-MCNC: 17 MG/DL (ref 6–24)
BUN/CREAT SERPL: 15 (ref 9–23)
CALCIUM SERPL-MCNC: 9.1 MG/DL (ref 8.7–10.2)
CHLORIDE SERPL-SCNC: 105 MMOL/L (ref 96–106)
CHOLEST SERPL-MCNC: 225 MG/DL (ref 100–199)
CO2 SERPL-SCNC: 18 MMOL/L (ref 20–29)
CREAT SERPL-MCNC: 1.11 MG/DL (ref 0.57–1)
EGFR: 62 ML/MIN/1.73
EOSINOPHIL # BLD AUTO: 0.3 X10E3/UL (ref 0–0.4)
EOSINOPHIL NFR BLD AUTO: 5 %
ERYTHROCYTE [DISTWIDTH] IN BLOOD BY AUTOMATED COUNT: 12 % (ref 11.7–15.4)
FERRITIN SERPL-MCNC: 8 NG/ML (ref 15–150)
GLOBULIN SER-MCNC: 2.1 G/DL (ref 1.5–4.5)
GLUCOSE SERPL-MCNC: 75 MG/DL (ref 70–99)
HCT VFR BLD AUTO: 38.5 % (ref 34–46.6)
HDLC SERPL-MCNC: 72 MG/DL
HGB BLD-MCNC: 12.8 G/DL (ref 11.1–15.9)
IMM GRANULOCYTES # BLD: 0 X10E3/UL (ref 0–0.1)
IMM GRANULOCYTES NFR BLD: 0 %
IRON SATN MFR SERPL: 28 % (ref 15–55)
IRON SERPL-MCNC: 100 UG/DL (ref 27–159)
LDLC SERPL CALC-MCNC: 143 MG/DL (ref 0–99)
LYMPHOCYTES # BLD AUTO: 2 X10E3/UL (ref 0.7–3.1)
LYMPHOCYTES NFR BLD AUTO: 34 %
MCH RBC QN AUTO: 31.3 PG (ref 26.6–33)
MCHC RBC AUTO-ENTMCNC: 33.2 G/DL (ref 31.5–35.7)
MCV RBC AUTO: 94 FL (ref 79–97)
MONOCYTES # BLD AUTO: 0.6 X10E3/UL (ref 0.1–0.9)
MONOCYTES NFR BLD AUTO: 10 %
NEUTROPHILS # BLD AUTO: 3 X10E3/UL (ref 1.4–7)
NEUTROPHILS NFR BLD AUTO: 50 %
PLATELET # BLD AUTO: 305 X10E3/UL (ref 150–450)
POTASSIUM SERPL-SCNC: 4.2 MMOL/L (ref 3.5–5.2)
PROT SERPL-MCNC: 6.4 G/DL (ref 6–8.5)
RBC # BLD AUTO: 4.09 X10E6/UL (ref 3.77–5.28)
SODIUM SERPL-SCNC: 138 MMOL/L (ref 134–144)
TIBC SERPL-MCNC: 358 UG/DL (ref 250–450)
TRIGL SERPL-MCNC: 60 MG/DL (ref 0–149)
TSH SERPL DL<=0.005 MIU/L-ACNC: 3.33 UIU/ML (ref 0.45–4.5)
UIBC SERPL-MCNC: 258 UG/DL (ref 131–425)
WBC # BLD AUTO: 6 X10E3/UL (ref 3.4–10.8)

## 2023-12-06 DIAGNOSIS — F41.8 DEPRESSION WITH ANXIETY: ICD-10-CM

## 2023-12-06 RX ORDER — VENLAFAXINE HYDROCHLORIDE 150 MG/1
CAPSULE, EXTENDED RELEASE ORAL
Qty: 90 CAPSULE | Refills: 3 | Status: SHIPPED | OUTPATIENT
Start: 2023-12-06

## 2023-12-07 DIAGNOSIS — R79.89 CREATININE ELEVATION: ICD-10-CM

## 2023-12-07 DIAGNOSIS — E61.1 IRON DEFICIENCY: Chronic | ICD-10-CM

## 2023-12-07 DIAGNOSIS — F41.8 DEPRESSION WITH ANXIETY: Primary | Chronic | ICD-10-CM

## 2023-12-07 RX ORDER — VENLAFAXINE HYDROCHLORIDE 37.5 MG/1
CAPSULE, EXTENDED RELEASE ORAL
Qty: 90 CAPSULE | Refills: 1 | Status: SHIPPED | OUTPATIENT
Start: 2023-12-07

## 2023-12-07 RX ORDER — FERROUS SULFATE 324(65)MG
324 TABLET, DELAYED RELEASE (ENTERIC COATED) ORAL DAILY
Qty: 90 TABLET | Refills: 1 | Status: SHIPPED | OUTPATIENT
Start: 2023-12-07

## 2023-12-13 DIAGNOSIS — E66.9 OBESITY (BMI 30-39.9): Primary | Chronic | ICD-10-CM

## 2023-12-14 ENCOUNTER — TELEMEDICINE (OUTPATIENT)
Dept: FAMILY MEDICINE CLINIC | Facility: CLINIC | Age: 48
End: 2023-12-14
Payer: COMMERCIAL

## 2023-12-14 DIAGNOSIS — U07.1 COVID-19: Primary | ICD-10-CM

## 2023-12-14 PROCEDURE — 99213 OFFICE O/P EST LOW 20 MIN: CPT | Performed by: FAMILY MEDICINE

## 2023-12-14 RX ORDER — NIRMATRELVIR AND RITONAVIR 300-100 MG
3 KIT ORAL 2 TIMES DAILY
Qty: 30 TABLET | Refills: 0 | Status: SHIPPED | OUTPATIENT
Start: 2023-12-14 | End: 2023-12-19

## 2023-12-14 NOTE — ASSESSMENT & PLAN NOTE
Symptom onset 12/9  Home test positive 12/14  Recommend Vit C, Vit D, Zinc  She would like to take Paxlovid as well, no medication interactions  Can continue symptom directed treatment as needed  Isolation precautions discussed  Work note provided  Follow up as needed

## 2023-12-14 NOTE — PROGRESS NOTES
COVID-19 Outpatient Progress Note    Assessment/Plan:    Problem List Items Addressed This Visit     COVID-19 - Primary     Symptom onset 12/9  Home test positive 12/14  Recommend Vit C, Vit D, Zinc  She would like to take Paxlovid as well, no medication interactions  Can continue symptom directed treatment as needed  Isolation precautions discussed  Work note provided  Follow up as needed         Relevant Medications    nirmatrelvir & ritonavir (Paxlovid, 300/100,) tablet therapy pack        Disposition:     Discussed symptom directed medication options with patient. Discussed vitamin D, vitamin C, and/or zinc supplementation with patient. Patient meets criteria for Paxlovid and they have been counseled appropriately regarding risks, benefits, side effects, and alternative treatment options. After discussion, patient agrees to treatment. Possible side effects of Paxlovid? Possible side effects of Paxlovid are:  - Liver Problems. Notify us right away if you start to experience loss of appetite, yellowing of your skin and the whites of eyes (jaundice), dark-colored urine, pale colored stools and itchy skin, stomach area (abdominal) pain. - Resistance to HIV Medicines. If you have untreated HIV infection, Paxlovid may lead to some HIV medicines not working as well in the future. - Other possible side effects include: altered sense of taste, diarrhea, high blood pressure, or muscle aches. I have spent a total time of 10 minutes on the day of the encounter for this patient including       Encounter provider: Abdias Au DO     Provider located at: [phone]     Recent Visits  No visits were found meeting these conditions.   Showing recent visits within past 7 days and meeting all other requirements  Today's Visits  Date Type Provider Dept   12/14/23 Telemedicine Abdias Au DO [facility] today's visits and meeting all other requirements  Future Appointments  No visits were found meeting these conditions. Showing future appointments within next 150 days and meeting all other requirements     This virtual check-in was done via [facility] and patient was informed that this is a secure, HIPAA-compliant platform. She agrees to proceed. Patient agrees to participate in a virtual check in via telephone or video visit instead of presenting to the office to address urgent/immediate medical needs. Patient is aware this is a billable service. She acknowledged consent and understanding of privacy and security of the video platform. The patient has agreed to participate and understands they can discontinue the visit at any time. After connecting through [facility], the patient was identified by name and date of birth. Griselda Craig was informed that this was a telemedicine visit and that the exam was being conducted confidentially over secure lines. My office door was closed. No one else was in the room. Griselda Craig acknowledged consent and understanding of privacy and security of the telemedicine visit. I informed the patient that I have reviewed her record in Epic and presented the opportunity for her to ask any questions regarding the visit today. The patient agreed to participate. Verification of patient location:  Patient is located in the following state in which I hold an active license: [place]    Subjective:   Griselda Craig is a 53 y.o. female who has been screened for COVID-19. Patient's symptoms include fatigue, malaise, nasal congestion, sore throat, cough, shortness of breath, chest tightness, myalgias and headache. Patient denies fever, chills, rhinorrhea, abdominal pain, nausea, vomiting and diarrhea. - Date of symptom onset: 12/9/2023  - Date of positive COVID-19 test: 12/13/2023. Type of test: Home antigen.      COVID-19 vaccination status: Fully vaccinated with booster    Negrito Skill has been staying home and has isolated themselves in her home. Lab Results   Component Value Date    SARSCOV2 Positive (A) 12/14/2021       Review of Systems   Constitutional:  Positive for fatigue. Negative for chills and fever. HENT:  Positive for congestion and sore throat. Negative for rhinorrhea. Respiratory:  Positive for cough, chest tightness and shortness of breath. Gastrointestinal:  Negative for abdominal pain, diarrhea, nausea and vomiting. Musculoskeletal:  Positive for myalgias. Neurological:  Positive for headaches. Current Outpatient Medications on File Prior to Visit   Medication Sig   • busPIRone (BUSPAR) 5 mg tablet Take 1 tablet twice a day as needed for anxiety, if needed dose could be increased to 2 tablets twice a day   • ferrous sulfate 324 (65 Fe) mg Take 1 tablet (324 mg total) by mouth daily   • ketorolac (TORADOL) 10 mg tablet Take 1 tablet (10 mg total) by mouth daily as needed for moderate pain or severe pain (Headache)   • pantoprazole (PROTONIX) 40 mg tablet Take 1 tablet (40 mg total) by mouth daily   • rizatriptan (MAXALT-MLT) 10 mg disintegrating tablet Take at the onset of migraine; if symptoms continue or return, may take another dose at least 2 hours after first dose. Take no more than 2 doses in a day. • Semaglutide-Weight Management (WEGOVY) 0.25 MG/0.5ML Inject 0.5 mL (0.25 mg total) under the skin once a week for 28 days   • topiramate (TOPAMAX) 100 mg tablet TAKE 1 TABLET BY MOUTH AT  BEDTIME   • topiramate (TOPAMAX) 50 MG tablet TAKE 1 TABLET BY MOUTH DAILY   • valACYclovir (VALTREX) 500 mg tablet Take 500 mg by mouth   • venlafaxine (EFFEXOR-XR) 150 mg 24 hr capsule TAKE 1 CAPSULE BY MOUTH DAILY   • venlafaxine (EFFEXOR-XR) 37.5 mg 24 hr capsule Take 1 capsule once a day along with Effexor  mg daily for the total dose of 187.5 mg daily       Objective: There were no vitals taken for this visit. Physical Exam  Vitals reviewed.    Constitutional: Appearance: She is ill-appearing. HENT:      Head: Normocephalic. Pulmonary:      Effort: Pulmonary effort is normal.   Skin:     General: Skin is warm and dry. Neurological:      Mental Status: She is alert.    Psychiatric:         Mood and Affect: Mood normal.         Behavior: Behavior normal.       Nazia Bolaños,

## 2023-12-14 NOTE — LETTER
December 14, 2023     Patient: Arielle Russell  YOB: 1975  Date of Visit: 12/14/2023      To Whom it May Concern:    Arielle Russell is under my professional care. Shabbir Eubanks was seen in my office on 12/14/2023. Shabbir Eubanks may return to work on 12/18/23 . Please excuse her absence due to illness 12/11-12/15. If you have any questions or concerns, please don't hesitate to call.          Sincerely,          Percy Holm,         CC: No Recipients

## 2024-01-05 ENCOUNTER — OFFICE VISIT (OUTPATIENT)
Dept: FAMILY MEDICINE CLINIC | Facility: CLINIC | Age: 49
End: 2024-01-05
Payer: COMMERCIAL

## 2024-01-05 VITALS
TEMPERATURE: 97.6 F | DIASTOLIC BLOOD PRESSURE: 80 MMHG | RESPIRATION RATE: 16 BRPM | WEIGHT: 216 LBS | SYSTOLIC BLOOD PRESSURE: 120 MMHG | OXYGEN SATURATION: 97 % | BODY MASS INDEX: 36.88 KG/M2 | HEART RATE: 102 BPM | HEIGHT: 64 IN

## 2024-01-05 DIAGNOSIS — K21.9 CHRONIC GERD: ICD-10-CM

## 2024-01-05 DIAGNOSIS — R10.11 RUQ PAIN: Primary | ICD-10-CM

## 2024-01-05 PROCEDURE — 99213 OFFICE O/P EST LOW 20 MIN: CPT | Performed by: FAMILY MEDICINE

## 2024-01-05 RX ORDER — PANTOPRAZOLE SODIUM 40 MG/1
40 TABLET, DELAYED RELEASE ORAL DAILY
Qty: 90 TABLET | Refills: 3 | Status: SHIPPED | OUTPATIENT
Start: 2024-01-05

## 2024-01-05 NOTE — PROGRESS NOTES
Name: Elvira Pearce      : 1975      MRN: [id number]  Encounter Provider: Jerrica Beavers MD  Encounter Date: 2024   Encounter department: [facility]    Assessment & Plan     1. RUQ pain  -     US right upper quadrant; Future; Expected date: 2024    2. Chronic GERD  -     pantoprazole (PROTONIX) 40 mg tablet; Take 1 tablet (40 mg total) by mouth daily    Patient presents for evaluation of recurrent abdominal bloating, burping, belching and right upper quadrant abdominal pain rating to the right mid back.  Family history of gallstones-mother.  Exam is benign.  She reports GERD symptoms at times.  Proceed with right upper quadrant ultrasound, follow bland low-fat diet, start Protonix.  Follow-up pending ultrasound results.       Subjective     RUQ pain - radiates to the back, symptoms started a few months ago. Bloated all the time.  Occasional pain at night, patient believes that pain is worse after food.  She has been constipated for a while.  Chronic burping and belching, worsening lately.  Family history of gallstones-mother.  Patient is concerned about inability to lose weight.  She has not started Wegovy due to shortage of Rx.      Abdominal Pain      Review of Systems   Constitutional: Negative.    HENT: Negative.     Eyes: Negative.    Respiratory: Negative.     Gastrointestinal:  Positive for abdominal distention and abdominal pain.        As per HPI   Endocrine: Negative.    Genitourinary: Negative.    Musculoskeletal: Negative.    Allergic/Immunologic: Negative.    Hematological: Negative.    Psychiatric/Behavioral: Negative.         Past Medical History:   Diagnosis Date   • Anxiety    • Depression    • Headache(784.0)    • Obesity      Past Surgical History:   Procedure Laterality Date   • NO PAST SURGERIES       Family History   Problem Relation Age of Onset   • Diabetes Mother    • Cerebral aneurysm Mother    • Hypertension Mother    • Heart attack Father 62   •  Hypertension Brother    • Breast cancer Maternal Aunt    • Substance Abuse Brother    • Thyroid disease Brother    • Breast cancer Maternal Aunt      Social History     Socioeconomic History   • Marital status: /Civil Union     Spouse name: None   • Number of children: None   • Years of education: None   • Highest education level: None   Occupational History   • None   Tobacco Use   • Smoking status: Former     Current packs/day: 0.00     Types: Cigarettes     Start date: 4/21/2018     Quit date: 3/30/2020     Years since quitting: 3.7   • Smokeless tobacco: Never   Vaping Use   • Vaping status: Never Used   Substance and Sexual Activity   • Alcohol use: Not Currently     Comment: Social   • Drug use: Not Currently     Types: Marijuana   • Sexual activity: Yes     Partners: Male     Birth control/protection: Other   Other Topics Concern   • None   Social History Narrative   • None     Social Determinants of Health     Financial Resource Strain: Not on file   Food Insecurity: Not on file   Transportation Needs: Not on file   Physical Activity: Not on file   Stress: Not on file   Social Connections: Not on file   Intimate Partner Violence: Not on file   Housing Stability: Not on file     Current Outpatient Medications on File Prior to Visit   Medication Sig   • busPIRone (BUSPAR) 5 mg tablet Take 1 tablet twice a day as needed for anxiety, if needed dose could be increased to 2 tablets twice a day   • ferrous sulfate 324 (65 Fe) mg Take 1 tablet (324 mg total) by mouth daily   • ketorolac (TORADOL) 10 mg tablet Take 1 tablet (10 mg total) by mouth daily as needed for moderate pain or severe pain (Headache)   • rizatriptan (MAXALT-MLT) 10 mg disintegrating tablet Take at the onset of migraine; if symptoms continue or return, may take another dose at least 2 hours after first dose. Take no more than 2 doses in a day.   • Semaglutide-Weight Management (WEGOVY) 0.25 MG/0.5ML Inject 0.5 mL (0.25 mg total) under the  "skin once a week for 28 days   • topiramate (TOPAMAX) 100 mg tablet TAKE 1 TABLET BY MOUTH AT  BEDTIME   • topiramate (TOPAMAX) 50 MG tablet TAKE 1 TABLET BY MOUTH DAILY   • valACYclovir (VALTREX) 500 mg tablet Take 500 mg by mouth   • venlafaxine (EFFEXOR-XR) 150 mg 24 hr capsule TAKE 1 CAPSULE BY MOUTH DAILY   • venlafaxine (EFFEXOR-XR) 37.5 mg 24 hr capsule Take 1 capsule once a day along with Effexor  mg daily for the total dose of 187.5 mg daily     Allergies   Allergen Reactions   • Clarithromycin Other (See Comments)     nausea   • Sulfa Antibiotics GI Intolerance   • Sulfamethoxazole-Trimethoprim Other (See Comments)     rash     Immunization History   Administered Date(s) Administered   • COVID-19 PFIZER VACCINE 0.3 ML IM 01/10/2021, 01/29/2021   • COVID-19 Pfizer vac (Akira-sucrose, gray cap) 12 yr+ IM 03/19/2022   • INFLUENZA 10/16/2015, 10/31/2018, 10/05/2019, 11/08/2020, 11/30/2021, 10/03/2023   • Influenza, injectable, quadrivalent, preservative free 0.5 mL 11/30/2021, 10/03/2023   • Influenza, seasonal, injectable 10/06/2016       Objective     /80 (BP Location: Left arm, Patient Position: Sitting, Cuff Size: Large)   Pulse 102   Temp 97.6 °F (36.4 °C) (Temporal)   Resp 16   Ht 5' 4\" (1.626 m)   Wt 98 kg (216 lb)   SpO2 97%   BMI 37.08 kg/m²     Physical Exam  Vitals and nursing note reviewed.   Constitutional:       General: She is not in acute distress.     Appearance: Normal appearance. She is well-developed. She is not ill-appearing.   HENT:      Head: Normocephalic and atraumatic.   Eyes:      Conjunctiva/sclera: Conjunctivae normal.   Neck:      Thyroid: No thyromegaly.      Vascular: No carotid bruit.   Cardiovascular:      Rate and Rhythm: Normal rate and regular rhythm.      Heart sounds: Normal heart sounds. No murmur heard.  Pulmonary:      Effort: Pulmonary effort is normal. No respiratory distress.      Breath sounds: Normal breath sounds. No wheezing.   Abdominal:      " General: Bowel sounds are increased. There is no abdominal bruit.      Palpations: Abdomen is soft.      Tenderness: There is no abdominal tenderness. There is no right CVA tenderness or left CVA tenderness.   Genitourinary:     Adnexa: Right adnexa normal and left adnexa normal.   Musculoskeletal:         General: Normal range of motion.      Cervical back: Neck supple.   Skin:     General: Skin is warm.   Neurological:      General: No focal deficit present.      Mental Status: She is alert and oriented to person, place, and time.      Cranial Nerves: No cranial nerve deficit.      Coordination: Coordination normal.   Psychiatric:         Mood and Affect: Mood normal.         Behavior: Behavior normal.       Jerrica Beavers MD

## 2024-01-08 ENCOUNTER — HOSPITAL ENCOUNTER (OUTPATIENT)
Dept: ULTRASOUND IMAGING | Facility: HOSPITAL | Age: 49
Discharge: HOME | End: 2024-01-08
Payer: COMMERCIAL

## 2024-01-08 DIAGNOSIS — R10.11 RUQ PAIN: ICD-10-CM

## 2024-01-08 PROCEDURE — 76705 ECHO EXAM OF ABDOMEN: CPT

## 2024-01-21 LAB
BUN SERPL-MCNC: 16 MG/DL (ref 6–24)
BUN/CREAT SERPL: 13 (ref 9–23)
CALCIUM SERPL-MCNC: 9.5 MG/DL (ref 8.7–10.2)
CHLORIDE SERPL-SCNC: 104 MMOL/L (ref 96–106)
CO2 SERPL-SCNC: 22 MMOL/L (ref 20–29)
CREAT SERPL-MCNC: 1.23 MG/DL (ref 0.57–1)
EGFR: 55 ML/MIN/1.73
GLUCOSE SERPL-MCNC: 78 MG/DL (ref 70–99)
POTASSIUM SERPL-SCNC: 4.5 MMOL/L (ref 3.5–5.2)
SODIUM SERPL-SCNC: 139 MMOL/L (ref 134–144)

## 2024-01-23 ENCOUNTER — TELEPHONE (OUTPATIENT)
Dept: FAMILY MEDICINE CLINIC | Facility: CLINIC | Age: 49
End: 2024-01-23

## 2024-01-23 DIAGNOSIS — R79.89 CREATININE ELEVATION: ICD-10-CM

## 2024-01-23 DIAGNOSIS — R10.9 ABDOMINAL PAIN, UNSPECIFIED ABDOMINAL LOCATION: ICD-10-CM

## 2024-01-23 DIAGNOSIS — R10.11 RUQ PAIN: Primary | ICD-10-CM

## 2024-01-24 ENCOUNTER — TELEPHONE (OUTPATIENT)
Dept: FAMILY MEDICINE CLINIC | Facility: CLINIC | Age: 49
End: 2024-01-24

## 2024-01-24 NOTE — TELEPHONE ENCOUNTER
Patient called to request Zepbound prescription be sent to the [facility]. Please advise.

## 2024-01-24 NOTE — TELEPHONE ENCOUNTER
Spoke with patient tonight regarding her MyChart message and results of recent BMP    Creatinine is slightly elevated again to 1.23 with GFR of 55.    Recent right upper quadrant ultrasound reveals normal appearance of right kidney but previous kidney ultrasound performed back in 2019 describes kidneys as normal in appearance but smaller in size    Patient is not using excessive amount of anti-inflammatories, she takes ibuprofen 600 mg twice a day 3 to 4 days/week for this menorrhea and takes it once or twice per months for headache    Patient stated that right upper quadrant abdominal pain has improved with the start of pantoprazole but lately has been recurring, described as dull discomfort in the right side radiating to the mid back.    Plan  Continue good fluid intake (patient admits that she has been drinking water) and keep up with minimizing anti-inflammatory use  Reduce dose of Topamax from 150 to 125 mg for 5 days and then reduce further to 100 mg daily  Proceed with CT abdomen and pelvis to reevaluate due to ongoing pain and slightly elevated creatinine, rule out kidney stones  Proceed with GI consult, rule out peptic ulcer disease  Repeat CBC and BMP in 2 weeks  Patient understands instructions and agrees

## 2024-01-25 NOTE — TELEPHONE ENCOUNTER
Called patient, no answer, LM.   Going to send through my chart also.    LM informing patient to call office back or read message through My Chart.

## 2024-01-25 NOTE — TELEPHONE ENCOUNTER
Please contact patient.  I do not feel comfortable and would strongly advise her again starting new medications till we complete the workup for her abdominal pain and decreased kidney function.      Medications for weight loss can cause abdominal pain nausea and vomiting and this is not a good time.  Let's await for results of CT scan, proceed with GI consult.      I will be in touch with patient once I see results of CT and repeat blood work and we can take it from there.    As soon as we stabilize her current conditions-I will be in favor of starting medication.  I would appreciate your understanding.  I do acknowledge that she is eager to start Rx for weight loss and I support her efforts    Thank you

## 2024-01-26 DIAGNOSIS — E66.9 OBESITY (BMI 30-39.9): Primary | Chronic | ICD-10-CM

## 2024-01-26 RX ORDER — TIRZEPATIDE 2.5 MG/.5ML
2.5 INJECTION, SOLUTION SUBCUTANEOUS WEEKLY
Qty: 2 ML | Refills: 0 | Status: SHIPPED | OUTPATIENT
Start: 2024-01-26 | End: 2024-02-23

## 2024-02-01 ENCOUNTER — HOSPITAL ENCOUNTER (OUTPATIENT)
Dept: CT IMAGING | Facility: HOSPITAL | Age: 49
Discharge: HOME | End: 2024-02-01
Payer: COMMERCIAL

## 2024-02-01 DIAGNOSIS — R10.9 ABDOMINAL PAIN, UNSPECIFIED ABDOMINAL LOCATION: ICD-10-CM

## 2024-02-01 DIAGNOSIS — R10.11 RUQ PAIN: ICD-10-CM

## 2024-02-01 PROCEDURE — G1004 CDSM NDSC: HCPCS

## 2024-02-01 PROCEDURE — 74177 CT ABD & PELVIS W/CONTRAST: CPT

## 2024-02-01 RX ADMIN — IOHEXOL 100 ML: 350 INJECTION, SOLUTION INTRAVENOUS at 18:01

## 2024-02-17 LAB
BASOPHILS # BLD AUTO: 0.1 X10E3/UL (ref 0–0.2)
BASOPHILS NFR BLD AUTO: 1 %
BUN SERPL-MCNC: 17 MG/DL (ref 6–24)
BUN/CREAT SERPL: 15 (ref 9–23)
CHLORIDE SERPL-SCNC: 106 MMOL/L (ref 96–106)
CO2 SERPL-SCNC: 21 MMOL/L (ref 20–29)
CREAT SERPL-MCNC: 1.14 MG/DL (ref 0.57–1)
EGFR: 60 ML/MIN/1.73
EOSINOPHIL # BLD AUTO: 0.6 X10E3/UL (ref 0–0.4)
EOSINOPHIL NFR BLD AUTO: 10 %
ERYTHROCYTE [DISTWIDTH] IN BLOOD BY AUTOMATED COUNT: 12.3 % (ref 11.7–15.4)
GLUCOSE SERPL-MCNC: 74 MG/DL (ref 70–99)
HCT VFR BLD AUTO: 40.4 % (ref 34–46.6)
HGB BLD-MCNC: 13.3 G/DL (ref 11.1–15.9)
IMM GRANULOCYTES # BLD: 0 X10E3/UL (ref 0–0.1)
IMM GRANULOCYTES NFR BLD: 0 %
LYMPHOCYTES # BLD AUTO: 1.7 X10E3/UL (ref 0.7–3.1)
LYMPHOCYTES NFR BLD AUTO: 29 %
MCH RBC QN AUTO: 30.5 PG (ref 26.6–33)
MCHC RBC AUTO-ENTMCNC: 32.9 G/DL (ref 31.5–35.7)
MCV RBC AUTO: 93 FL (ref 79–97)
MONOCYTES # BLD AUTO: 0.6 X10E3/UL (ref 0.1–0.9)
MONOCYTES NFR BLD AUTO: 10 %
NEUTROPHILS # BLD AUTO: 2.9 X10E3/UL (ref 1.4–7)
NEUTROPHILS NFR BLD AUTO: 50 %
PLATELET # BLD AUTO: 348 X10E3/UL (ref 150–450)
POTASSIUM SERPL-SCNC: 4.9 MMOL/L (ref 3.5–5.2)
RBC # BLD AUTO: 4.36 X10E6/UL (ref 3.77–5.28)
SODIUM SERPL-SCNC: 139 MMOL/L (ref 134–144)
WBC # BLD AUTO: 5.9 X10E3/UL (ref 3.4–10.8)

## 2024-02-21 ENCOUNTER — TELEPHONE (OUTPATIENT)
Dept: FAMILY MEDICINE CLINIC | Facility: CLINIC | Age: 49
End: 2024-02-21

## 2024-02-21 DIAGNOSIS — R79.89 CREATININE ELEVATION: Primary | ICD-10-CM

## 2024-03-25 ENCOUNTER — PATIENT MESSAGE (OUTPATIENT)
Dept: FAMILY MEDICINE CLINIC | Facility: CLINIC | Age: 49
End: 2024-03-25

## 2024-04-12 DIAGNOSIS — E78.2 MODERATE MIXED HYPERLIPIDEMIA NOT REQUIRING STATIN THERAPY: Primary | ICD-10-CM

## 2024-04-12 DIAGNOSIS — E61.1 IRON DEFICIENCY: ICD-10-CM

## 2024-04-12 DIAGNOSIS — E66.9 OBESITY (BMI 30-39.9): Chronic | ICD-10-CM

## 2024-04-18 DIAGNOSIS — F41.8 DEPRESSION WITH ANXIETY: Chronic | ICD-10-CM

## 2024-04-18 DIAGNOSIS — E61.1 IRON DEFICIENCY: Chronic | ICD-10-CM

## 2024-04-19 RX ORDER — VENLAFAXINE HYDROCHLORIDE 37.5 MG/1
CAPSULE, EXTENDED RELEASE ORAL
Qty: 90 CAPSULE | Refills: 0 | Status: SHIPPED | OUTPATIENT
Start: 2024-04-19

## 2024-04-19 RX ORDER — FERROUS SULFATE 324(65)MG
324 TABLET, DELAYED RELEASE (ENTERIC COATED) ORAL DAILY
Qty: 90 TABLET | Refills: 0 | Status: SHIPPED | OUTPATIENT
Start: 2024-04-19

## 2024-04-19 NOTE — TELEPHONE ENCOUNTER
I just refilled patient's medication without further RF. Patient is due for an office visit, please set up.Thank you

## 2024-04-21 LAB
ALBUMIN SERPL-MCNC: 4.3 G/DL (ref 3.9–4.9)
ALBUMIN/GLOB SERPL: 1.8 {RATIO} (ref 1.2–2.2)
ALP SERPL-CCNC: 66 IU/L (ref 44–121)
ALT SERPL-CCNC: 13 IU/L (ref 0–32)
AST SERPL-CCNC: 15 IU/L (ref 0–40)
BILIRUB SERPL-MCNC: 0.6 MG/DL (ref 0–1.2)
BUN SERPL-MCNC: 14 MG/DL (ref 6–24)
BUN/CREAT SERPL: 12 (ref 9–23)
CALCIUM SERPL-MCNC: 9.5 MG/DL (ref 8.7–10.2)
CHLORIDE SERPL-SCNC: 105 MMOL/L (ref 96–106)
CHOLEST SERPL-MCNC: 213 MG/DL (ref 100–199)
CO2 SERPL-SCNC: 20 MMOL/L (ref 20–29)
CREAT SERPL-MCNC: 1.17 MG/DL (ref 0.57–1)
EGFR: 58 ML/MIN/1.73
GLOBULIN SER-MCNC: 2.4 G/DL (ref 1.5–4.5)
GLUCOSE SERPL-MCNC: 84 MG/DL (ref 70–99)
HBA1C MFR BLD: 5.5 % (ref 4.8–5.6)
HDLC SERPL-MCNC: 61 MG/DL
LDLC SERPL CALC-MCNC: 134 MG/DL (ref 0–99)
POTASSIUM SERPL-SCNC: 4.6 MMOL/L (ref 3.5–5.2)
PROT SERPL-MCNC: 6.7 G/DL (ref 6–8.5)
SODIUM SERPL-SCNC: 140 MMOL/L (ref 134–144)
TRIGL SERPL-MCNC: 104 MG/DL (ref 0–149)

## 2024-04-26 ENCOUNTER — TELEPHONE (OUTPATIENT)
Dept: FAMILY MEDICINE CLINIC | Facility: CLINIC | Age: 49
End: 2024-04-26

## 2024-04-26 DIAGNOSIS — Z82.49 FAMILY HISTORY OF CORONARY ARTERY DISEASE OCCURRING PRIOR TO 55 YEARS OF AGE: Primary | ICD-10-CM

## 2024-04-26 DIAGNOSIS — E78.2 MIXED HYPERLIPIDEMIA: ICD-10-CM

## 2024-04-26 DIAGNOSIS — R79.89 ELEVATED SERUM CREATININE: ICD-10-CM

## 2024-04-26 RX ORDER — ROSUVASTATIN CALCIUM 10 MG/1
10 TABLET, COATED ORAL DAILY
Qty: 90 TABLET | Refills: 1 | Status: SHIPPED | OUTPATIENT
Start: 2024-04-26 | End: 2024-05-02

## 2024-04-26 NOTE — TELEPHONE ENCOUNTER
I spoke with patient tonight regarding results of blood work and her recent MyChart messages.  Strong family history of CAD.  Start Crestor 10 mg daily.  She may consider CT coronary score study  Referral to nephrology, mild yet persistent elevation of creatinine.  Unremarkable appearance of kidneys on recent CT abdomen and pelvis.  Patient is awaiting for consultation with [facility].  Currently scheduled for July 10.  She is still experiencing recurrent bloating and upper abdominal discomfort.  I recommend to reschedule appointment with 1 of CRNP's or PAs to speed up the process  Patient will keep me posted about insurance coverage or weight loss medications

## 2024-05-01 ENCOUNTER — TELEPHONE (OUTPATIENT)
Age: 49
End: 2024-05-01

## 2024-05-01 DIAGNOSIS — E78.2 MIXED HYPERLIPIDEMIA: Primary | ICD-10-CM

## 2024-05-01 NOTE — TELEPHONE ENCOUNTER
Pt called stating that she took 2 or 3 of the rosuvastatin (CRESTOR) 10 MG tablets and broke out in an itchy rash on her chest. She did some research along with calling the pharmacy and this med seems to have Sulfa in it which she is allergic to.    She would like something else called in.    Confirmed pharm [id number] - [place], [place] - [address]   Phone: [phone]  Fax: [phone]    Please advise patient.    Thank you

## 2024-05-02 ENCOUNTER — NURSE TRIAGE (OUTPATIENT)
Age: 49
End: 2024-05-02

## 2024-05-02 ENCOUNTER — PATIENT MESSAGE (OUTPATIENT)
Dept: FAMILY MEDICINE CLINIC | Facility: CLINIC | Age: 49
End: 2024-05-02

## 2024-05-02 RX ORDER — ATORVASTATIN CALCIUM 10 MG/1
10 TABLET, FILM COATED ORAL DAILY
Qty: 30 TABLET | Refills: 5 | Status: SHIPPED | OUTPATIENT
Start: 2024-05-02 | End: 2024-05-07

## 2024-05-02 NOTE — TELEPHONE ENCOUNTER
I have not heard of Crestor being related to sulfa.    I recommend  to stop the Rx,  give a few days for the rash to disappear, then try alternative Rx for cholesterol next week.    I will send Rx for Atorvastatin.    Thank you

## 2024-05-02 NOTE — TELEPHONE ENCOUNTER
Attempted to contact Pt via telephone. Voicemail left with instructions to return call with questions or concerns.     Reason for Disposition   Message left on identified voicemail    Protocols used: No Contact or Duplicate Contact Call-ADULT-[place]

## 2024-05-02 NOTE — TELEPHONE ENCOUNTER
Pt was warm transferred by Mary D to this RN as Klaudia was unavailable.  Pt states that message on her VM was not detailed and is unsure what call is about.  Pt is inquiring if an alternative medication to rosuvastatin is going to be prescribed to her.  Pt confirmed she stopped taking medication.    Pt states her VM may have her maiden name but OK to leave detailed message on VM is she does not  and she states that a [employer] message could also be sent to her as well.  Please review and advise.

## 2024-05-06 NOTE — PATIENT COMMUNICATION
Patient called in regarding crestor script. States that the rash is not from crestor it is poison ivy. She has it all over chest. She is treating with OTC medications. But wants PCP to know she is going to go back onto the crestor instead of the new medication ordered.

## 2024-05-07 ENCOUNTER — VBI (OUTPATIENT)
Dept: ADMINISTRATIVE | Facility: OTHER | Age: 49
End: 2024-05-07

## 2024-05-07 RX ORDER — ROSUVASTATIN CALCIUM 10 MG/1
10 TABLET, COATED ORAL DAILY
COMMUNITY

## 2024-05-09 ENCOUNTER — PATIENT MESSAGE (OUTPATIENT)
Dept: FAMILY MEDICINE CLINIC | Facility: CLINIC | Age: 49
End: 2024-05-09

## 2024-05-10 NOTE — PATIENT COMMUNICATION
Patient returned called and stated she needs an excuse for Tuesday 5/7 through Friday 5/10. Letter was composed and sent via [employer].

## 2024-05-10 NOTE — PATIENT COMMUNICATION
Called Elvira and left a VM for her to call us back to clarify dates she needs covered. Will compose note after clarification has been received.

## 2024-05-28 ENCOUNTER — PREP FOR PROCEDURE (OUTPATIENT)
Dept: GASTROENTEROLOGY | Facility: MEDICAL CENTER | Age: 49
End: 2024-05-28

## 2024-05-28 ENCOUNTER — CONSULT (OUTPATIENT)
Dept: GASTROENTEROLOGY | Facility: MEDICAL CENTER | Age: 49
End: 2024-05-28
Payer: COMMERCIAL

## 2024-05-28 ENCOUNTER — TELEPHONE (OUTPATIENT)
Dept: GASTROENTEROLOGY | Facility: MEDICAL CENTER | Age: 49
End: 2024-05-28

## 2024-05-28 VITALS
SYSTOLIC BLOOD PRESSURE: 116 MMHG | DIASTOLIC BLOOD PRESSURE: 70 MMHG | WEIGHT: 245 LBS | BODY MASS INDEX: 41.83 KG/M2 | TEMPERATURE: 98.2 F | HEIGHT: 64 IN | OXYGEN SATURATION: 99 % | HEART RATE: 100 BPM

## 2024-05-28 DIAGNOSIS — R10.9 ABDOMINAL PAIN, UNSPECIFIED ABDOMINAL LOCATION: ICD-10-CM

## 2024-05-28 DIAGNOSIS — K59.00 CONSTIPATION, UNSPECIFIED CONSTIPATION TYPE: ICD-10-CM

## 2024-05-28 DIAGNOSIS — R10.11 RUQ PAIN: ICD-10-CM

## 2024-05-28 DIAGNOSIS — Z12.11 SCREENING FOR COLON CANCER: Primary | ICD-10-CM

## 2024-05-28 PROCEDURE — 99204 OFFICE O/P NEW MOD 45 MIN: CPT | Performed by: INTERNAL MEDICINE

## 2024-05-28 RX ORDER — POLYETHYLENE GLYCOL 3350 17 G/17G
17 POWDER, FOR SOLUTION ORAL DAILY
Qty: 255 G | Refills: 0 | Status: SHIPPED | OUTPATIENT
Start: 2024-05-28

## 2024-05-28 NOTE — TELEPHONE ENCOUNTER
Procedure: EGD/Colonoscopy  Date: 07/17/2024  Physician performing: Dr. Scott  Location of procedure:  [facility]  Instructions given to patient: Yes  Diabetic: No  Clearances: N/A

## 2024-05-28 NOTE — PROGRESS NOTES
[facility] - Outpatient Consultation  Elvira Pearce 49 y.o. female MRN: [id number]  Encounter: [id number]          ASSESSMENT AND PLAN:   49-year-old female with history of migraine headaches, hyperlipidemia who presents for evaluation.    1. RUQ pain  She reports new onset of right upper quadrant abdominal pain starting approximately 6 months ago.  She is taking daily PPI with no improvement of her symptoms.  She also underwent abdominal ultrasound and CT scan without clear etiology.  I recommend continuing daily PPI for now and scheduling diagnostic EGD to rule out mucosal abnormality such as peptic ulcer disease, gastritis, H. pylori infection that could be contributing to her symptoms    - EGD; Future    2. Constipation, unspecified constipation type  3. Abdominal pain, unspecified abdominal location  She has a history of generalized abdominal bloating, discomfort and change in bowel habits starting 6 months ago.  I discussed the importance of constipation management clinic high-fiber diet, fiber supplementation goal of 25 g/day and adequate hydration.  Recommend starting MiraLAX once daily and increasing or decreasing as needed to have a soft, formed bowel movement per day  - polyethylene glycol (GLYCOLAX) 17 GM/SCOOP powder; Take 17 g by mouth daily  Dispense: 255 g; Refill: 0        4. Screening for colon cancer  She has no prior colonoscopy and is due at this time given her age greater than 45.  She reports a family history of her mother with a possible diagnosis of colon cancer, she will confirm this with her mother and let us know at her follow-up visit.  We discussed that if her mother has a history of colon cancer she would require repeat colonoscopy at every 5-year intervals for screening.  Next I obtained informed consent from the patient. The risks/benefits/alternatives of the procedure were discussed with the patient. Risks included, but not limited to, infection,  bleeding, perforation, injury to organs in the abdomen, missed lesion and incomplete procedure were discussed. Patient was agreeable and electronic signature was obtained.    - polyethylene glycol (GOLYTELY) 4000 mL solution; Take as instructed on bowel preparation instructions  Dispense: 4000 mL; Refill: 0  - Colonoscopy; Future      ______________________________________________________________________    HPI: 49-year-old female with history of migraine headaches, hyperlipidemia who presents for evaluation.    She reports new onset of right upper quadrant abdominal pain, bloating and change in bowel habit starting in October.  She reports new stresses at home around that time as well but no other triggers to cause her symptoms.  She was previously having a bowel movement every day or every other day but her bowel habits have changed every 2 to 3 days with [place] type I stool associated with straining.  She denies rectal bleeding.  She also reports right upper quadrant dull discomfort which can be worse with certain positions.  The pain is mildly relieved after having a bowel movement.  She has generalized abdominal bloating and low appetite as well.  She states she has gained 50 pounds due to eating unhealthy foods including ice cream.  She was previously taking Protonix as needed and 1 to 2 months ago began taking the medication every day with no improvement of her symptoms.  She reports intermittent chronic NSAID use with her menstrual cycle or migraine headaches.    She reports a possible diagnosis of colon cancer in her mother in her 60s required a bowel resection.  She has no prior GI surgical history        4/2024 liver enzymes within normal limit  12/2023 hemoglobin 12.8  2/2024 CT showed small amount of oral contrast in the distal esophagus suggestive of GERD  1/2024 abdominal ultrasound normal  Prior EGD/colonoscopy none    REVIEW OF SYSTEMS:    CONSTITUTIONAL: Denies any fever, chills, rigors, and  weight loss.  HEENT: No earache or tinnitus. Denies hearing loss or visual disturbances.  CARDIOVASCULAR: No chest pain or palpitations.   RESPIRATORY: Denies any cough, hemoptysis, shortness of breath or dyspnea on exertion.  GASTROINTESTINAL: As noted in the History of Present Illness.   GENITOURINARY: No problems with urination. Denies any hematuria or dysuria.  NEUROLOGIC: No dizziness or vertigo, denies headaches.   MUSCULOSKELETAL: Denies any muscle or joint pain.   SKIN: Denies skin rashes or itching.   ENDOCRINE: Denies excessive thirst. Denies intolerance to heat or cold.  PSYCHOSOCIAL: Denies depression or anxiety. Denies any recent memory loss.       Historical Information   Past Medical History:   Diagnosis Date    Anxiety     Depression     Headache(784.0)     Obesity      Past Surgical History:   Procedure Laterality Date    NO PAST SURGERIES       Social History   Social History     Substance and Sexual Activity   Alcohol Use Not Currently    Comment: Social     Social History     Substance and Sexual Activity   Drug Use Not Currently    Types: Marijuana     Social History     Tobacco Use   Smoking Status Former    Current packs/day: 0.00    Types: Cigarettes    Start date: 2018    Quit date: 3/30/2020    Years since quittin.1   Smokeless Tobacco Never     Family History   Problem Relation Age of Onset    Diabetes Mother     Cerebral aneurysm Mother     Hypertension Mother     Heart attack Father 62    Hypertension Brother     Breast cancer Maternal Aunt     Substance Abuse Brother     Thyroid disease Brother     Breast cancer Maternal Aunt        Meds/Allergies       Current Outpatient Medications:     ketorolac (TORADOL) 10 mg tablet    pantoprazole (PROTONIX) 40 mg tablet    rizatriptan (MAXALT-MLT) 10 mg disintegrating tablet    rosuvastatin (CRESTOR) 10 MG tablet    topiramate (TOPAMAX) 100 mg tablet    topiramate (TOPAMAX) 50 MG tablet    valACYclovir (VALTREX) 500 mg tablet     "venlafaxine (EFFEXOR-XR) 150 mg 24 hr capsule    venlafaxine (EFFEXOR-XR) 37.5 mg 24 hr capsule    busPIRone (BUSPAR) 5 mg tablet    ferrous sulfate 324 (65 Fe) mg    Allergies   Allergen Reactions    Clarithromycin Other (See Comments)     nausea    Sulfa Antibiotics GI Intolerance    Sulfamethoxazole-Trimethoprim Other (See Comments)     rash           Objective     Blood pressure 116/70, pulse 100, temperature 98.2 °F (36.8 °C), temperature source Tympanic, height 5' 4\" (1.626 m), weight 111 kg (245 lb), SpO2 99%. Body mass index is 42.05 kg/m².        PHYSICAL EXAM:      General Appearance:   Alert, cooperative, no distress   HEENT:   Normocephalic, atraumatic, anicteric.     Neck:  Supple, symmetrical, trachea midline   Lungs:   Clear to auscultation bilaterally; no rales, rhonchi or wheezing; respirations unlabored    Heart::   Regular rate and rhythm; no murmur, rub, or gallop.   Abdomen:   Soft, non-tender, non-distended; normal bowel sounds; no masses, no organomegaly    Genitalia:   Deferred    Rectal:   Deferred    Extremities:  No cyanosis, clubbing or edema    Pulses:  2+ and symmetric    Skin:  No jaundice, rashes, or lesions    Lymph nodes:  No palpable cervical lymphadenopathy        Lab Results:   No visits with results within 1 Day(s) from this visit.   Latest known visit with results is:   Orders Only on 04/20/2024   Component Date Value    Glucose, Random 04/20/2024 84     BUN 04/20/2024 14     Creatinine 04/20/2024 1.17 (H)     eGFR 04/20/2024 58 (L)     SL AMB BUN/CREATININE RA* 04/20/2024 12     Sodium 04/20/2024 140     Potassium 04/20/2024 4.6     Chloride 04/20/2024 105     CO2 04/20/2024 20     CALCIUM 04/20/2024 9.5     Protein, Total 04/20/2024 6.7     Albumin 04/20/2024 4.3     Globulin, Total 04/20/2024 2.4     Albumin/Globulin Ratio 04/20/2024 1.8     TOTAL BILIRUBIN 04/20/2024 0.6     Alk Phos Isoenzymes 04/20/2024 66     AST 04/20/2024 15     ALT 04/20/2024 13     Cholesterol, Total " 04/20/2024 213 (H)     Triglycerides 04/20/2024 104     HDL 04/20/2024 61     LDL Calculated 04/20/2024 134 (H)     Hemoglobin A1C 04/20/2024 5.5          Radiology Results:   No results found.    This note was completed in part utilizing Dragon Software. Grammatical errors, random word insertions, spelling mistakes, and incomplete sentences may be an occasional consequence of this system secondary to software limitations, ambient noise, and hardware issues. If you have any questions or concerns about the content, text, or information contained within the body of this dictation, please contact the provider for clarification.

## 2024-06-10 ENCOUNTER — OFFICE VISIT (OUTPATIENT)
Dept: FAMILY MEDICINE CLINIC | Facility: CLINIC | Age: 49
End: 2024-06-10
Payer: COMMERCIAL

## 2024-06-10 VITALS
OXYGEN SATURATION: 100 % | SYSTOLIC BLOOD PRESSURE: 120 MMHG | TEMPERATURE: 97.8 F | RESPIRATION RATE: 16 BRPM | DIASTOLIC BLOOD PRESSURE: 76 MMHG | HEART RATE: 92 BPM | HEIGHT: 64 IN | BODY MASS INDEX: 42.65 KG/M2 | WEIGHT: 249.8 LBS

## 2024-06-10 DIAGNOSIS — E78.2 MIXED HYPERLIPIDEMIA: Primary | ICD-10-CM

## 2024-06-10 DIAGNOSIS — F41.8 DEPRESSION WITH ANXIETY: Chronic | ICD-10-CM

## 2024-06-10 DIAGNOSIS — K21.9 GASTROESOPHAGEAL REFLUX DISEASE, UNSPECIFIED WHETHER ESOPHAGITIS PRESENT: ICD-10-CM

## 2024-06-10 DIAGNOSIS — R79.89 ELEVATED SERUM CREATININE: ICD-10-CM

## 2024-06-10 DIAGNOSIS — R10.11 RUQ PAIN: ICD-10-CM

## 2024-06-10 DIAGNOSIS — E61.1 IRON DEFICIENCY: Chronic | ICD-10-CM

## 2024-06-10 DIAGNOSIS — E66.01 MORBID (SEVERE) OBESITY DUE TO EXCESS CALORIES (HCC): ICD-10-CM

## 2024-06-10 PROCEDURE — 99214 OFFICE O/P EST MOD 30 MIN: CPT | Performed by: FAMILY MEDICINE

## 2024-06-10 RX ORDER — ROSUVASTATIN CALCIUM 10 MG/1
10 TABLET, COATED ORAL DAILY
Qty: 90 TABLET | Refills: 1 | Status: SHIPPED | OUTPATIENT
Start: 2024-06-10

## 2024-06-10 RX ORDER — CLONAZEPAM 0.5 MG/1
TABLET ORAL
Qty: 30 TABLET | Refills: 0 | Status: SHIPPED | OUTPATIENT
Start: 2024-06-10

## 2024-06-10 NOTE — PROGRESS NOTES
Ambulatory Visit  Name: Elvira Pearce      : 1975      MRN: [id number]  Encounter Provider: Jerrica Beavers MD  Encounter Date: 6/10/2024   Encounter department: [facility]    Assessment & Plan   1. Mixed hyperlipidemia  Assessment & Plan:  Continue rosuvastatin 10 mg daily  Orders:  -     CBC; Future  -     Comprehensive metabolic panel; Future  -     Lipid Panel with Direct LDL reflex; Future  -     TSH, 3rd generation; Future  -     rosuvastatin (CRESTOR) 10 MG tablet; Take 1 tablet (10 mg total) by mouth daily  2. Depression with anxiety  Assessment & Plan:  Continue Effexor.  Ongoing symptoms of anxiety, marital stress, patient is frustrated with inability to lose weight.  Reports poor sleep, worsening of anxiety every evening.  Trial of Klonopin in the evening/bedtime PRN  Orders:  -     clonazePAM (KlonoPIN) 0.5 mg tablet; Take 1 or 2 tablets at bedtime as needed for anxiety/insomnia  3. Gastroesophageal reflux disease, unspecified whether esophagitis present  Assessment & Plan:  Continue pantoprazole.  Symptoms improved.  Pending EGD colonoscopy  4. Iron deficiency  Assessment & Plan:  Iron /CBC have normalized.  Patient may use iron only during menstrual cycles  5. Elevated serum creatinine  Assessment & Plan:  Unclear etiology.  Mild elevation of creatinine.  Unremarkable kidney/bladder appearance on CT 2024 .Blood pressure is well-controlled.  Continue monitoring.  Consider nephrology consult if elevation persists.  Advise drink plenty water, avoid/reduce NSAID use  omponent  Ref Range & Units 24  9:33 AM 24  8:31 AM 24  9:35 AM 23  8:55 AM 23  8:35 AM 22 12:00 AM 22  7:15 AM   Glucose, Random  70 - 99 mg/dL 84 74 78 75 101 High  R 85 R 86 R   BUN  6 - 24 mg/dL 14 17 16 17 17 R 12 R 16   Creatinine  0.57 - 1.00 mg/dL 1.17 High  1.14 High  1.23 High  1.11 High  0.93 R 0.92 R 0.90   eGFR  >59 mL/min/1.73 58 Low  60 55 Low  62 76 R  78 R 80   SL AMB BUN/CREATININE RATIO  9 - 23 12 15 13 15   18     6. Morbid (severe) obesity due to excess calories (HCC)  Assessment & Plan:  56 pound weight gain since October 2023.  Proceed with labs.  Encouraged to consume healthy diet.  No processed foods.  Minimize starches and desserts.  Patient will research current coverage of GLP-1 RA agonist.  We will consider treatment once GI workup is completed.  Alternatively may consider evaluation by bariatric surgery  7. RUQ pain  Assessment & Plan:  Negative right upper quadrant ultrasound.  Persistent right upper quadrant pain.  Not related to meals.  No nausea or vomiting.  No improvement on Protonix.  Pending EGD/colonoscopy.  Strong family history of gallbladder disease.  Risk factors include age and obesity.  Proceed with HIDA scan  Orders:  -     NM Hepatobiliary w RX; Future; Expected date: 06/13/2024       Patient Instructions   Please contact your pharmacy regarding possible coverage of Wegovy or Zepbound  I will be in touch regarding further testing for your gallbladder (HIDA scan)  Please start taking MiraLAX daily  Okay to stop iron, you may use it as needed during menstrual cycle  Blood work end of July  Try Klonopin PRN  You are due for mammography  in mid July  We will touch base after EGD/colonoscopy/further gallbladder testing and depending on med coverage      History of Present Illness     The patient presents for follow-up.  Ongoing right upper quadrant abdominal pain.  Family history of gallbladder disease/cholecystectomy-mother.  Right upper quadrant ultrasound was unremarkable.  Patient was seen by gastroenterology and is scheduled for EGD and colonoscopy.  She is on Protonix daily.  It helps with symptoms of burping and belching but did not relieve abdominal pain    Patient complains of chronic constipation.  Menses are every months.  Last mammogram July 2023, she follows every [facility] at [facility].    Patient is frustrated with ongoing weight  gain.  According to our records she has gained 56 pounds since our last annual wellness in October, today's BMI is 42.8.  TSH back in December was normal patient said that she is skipping meals throughout the day and tries to eat very little but yet enjoys her ice cream every night.    Ongoing family related stress.  She remains on Effexor.    Hyperlipidemia: Patient is on Crestor, tolerates Rx well with no side effects    Mild stable elevation of creatinine.  Unclear etiology.  Blood pressure is well-controlled.  Unremarkable kidney/bladder appearance on recent CT in 2024      Review of Systems   Constitutional:  Positive for unexpected weight change.   HENT: Negative.     Respiratory: Negative.     Cardiovascular: Negative.    Gastrointestinal:  Positive for abdominal pain.   Endocrine: Negative.    Genitourinary: Negative.    Musculoskeletal: Negative.    Allergic/Immunologic: Negative.    Neurological: Negative.    Psychiatric/Behavioral:  The patient is nervous/anxious.      Past Medical History:   Diagnosis Date   • Anxiety    • Depression    • Headache(784.0)    • Obesity      Past Surgical History:   Procedure Laterality Date   • NO PAST SURGERIES       Family History   Problem Relation Age of Onset   • Diabetes Mother    • Cerebral aneurysm Mother    • Hypertension Mother    • Heart attack Father 62   • Hypertension Brother    • Breast cancer Maternal Aunt    • Substance Abuse Brother    • Thyroid disease Brother    • Breast cancer Maternal Aunt      Social History     Tobacco Use   • Smoking status: Former     Current packs/day: 0.00     Types: Cigarettes     Start date: 2018     Quit date: 3/30/2020     Years since quittin.2   • Smokeless tobacco: Never   Vaping Use   • Vaping status: Every Day   • Substances: Nicotine   Substance and Sexual Activity   • Alcohol use: Not Currently     Comment: Social   • Drug use: Not Currently     Types: Marijuana   • Sexual activity: Yes     Partners:  "Male     Birth control/protection: Other     Current Outpatient Medications on File Prior to Visit   Medication Sig   • ketorolac (TORADOL) 10 mg tablet Take 1 tablet (10 mg total) by mouth daily as needed for moderate pain or severe pain (Headache)   • pantoprazole (PROTONIX) 40 mg tablet Take 1 tablet (40 mg total) by mouth daily   • polyethylene glycol (GLYCOLAX) 17 GM/SCOOP powder Take 17 g by mouth daily   • polyethylene glycol (GOLYTELY) 4000 mL solution Take as instructed on bowel preparation instructions   • rizatriptan (MAXALT-MLT) 10 mg disintegrating tablet Take at the onset of migraine; if symptoms continue or return, may take another dose at least 2 hours after first dose. Take no more than 2 doses in a day.   • topiramate (TOPAMAX) 100 mg tablet TAKE 1 TABLET BY MOUTH AT  BEDTIME   • topiramate (TOPAMAX) 50 MG tablet TAKE 1 TABLET BY MOUTH DAILY   • valACYclovir (VALTREX) 500 mg tablet Take 500 mg by mouth   • venlafaxine (EFFEXOR-XR) 150 mg 24 hr capsule TAKE 1 CAPSULE BY MOUTH DAILY   • venlafaxine (EFFEXOR-XR) 37.5 mg 24 hr capsule Take 1 capsule once a day along with Effexor  mg daily for the total dose of 187.5 mg daily     Allergies   Allergen Reactions   • Clarithromycin Other (See Comments)     nausea   • Sulfa Antibiotics GI Intolerance   • Sulfamethoxazole-Trimethoprim Other (See Comments)     rash     Immunization History   Administered Date(s) Administered   • COVID-19 PFIZER VACCINE 0.3 ML IM 01/10/2021, 01/29/2021   • COVID-19 Pfizer vac (Akira-sucrose, gray cap) 12 yr+ IM 03/19/2022   • INFLUENZA 10/16/2015, 10/31/2018, 10/05/2019, 11/08/2020, 11/30/2021, 10/03/2023   • Influenza, injectable, quadrivalent, preservative free 0.5 mL 11/30/2021, 10/03/2023   • Influenza, seasonal, injectable 10/06/2016     Objective     /76 (BP Location: Left arm, Patient Position: Sitting, Cuff Size: Large)   Pulse 92   Temp 97.8 °F (36.6 °C) (Temporal)   Resp 16   Ht 5' 4\" (1.626 m)   Wt " 113 kg (249 lb 12.8 oz)   LMP 05/22/2024 (Exact Date)   SpO2 100%   BMI 42.88 kg/m²     Physical Exam  Vitals and nursing note reviewed.   Constitutional:       General: She is not in acute distress.     Appearance: Normal appearance. She is well-developed. She is not ill-appearing.   HENT:      Head: Normocephalic and atraumatic.   Eyes:      General: No scleral icterus.     Conjunctiva/sclera: Conjunctivae normal.   Neck:      Vascular: No carotid bruit.   Cardiovascular:      Rate and Rhythm: Normal rate and regular rhythm.      Heart sounds: Normal heart sounds. No murmur heard.  Pulmonary:      Effort: Pulmonary effort is normal. No respiratory distress.      Breath sounds: Normal breath sounds.   Abdominal:      General: Bowel sounds are normal. There is no distension or abdominal bruit.      Palpations: Abdomen is soft.      Tenderness: There is abdominal tenderness in the right upper quadrant. There is no guarding or rebound. Negative signs include Nicolas's sign.      Hernia: No hernia is present.   Musculoskeletal:      Cervical back: Neck supple. No rigidity.      Right lower leg: No edema.      Left lower leg: No edema.   Skin:     General: Skin is warm.   Neurological:      General: No focal deficit present.      Mental Status: She is alert and oriented to person, place, and time.   Psychiatric:         Mood and Affect: Mood normal.         Behavior: Behavior normal.         Thought Content: Thought content normal.       Administrative Statements

## 2024-06-10 NOTE — PATIENT INSTRUCTIONS
Please contact your pharmacy regarding possible coverage of Wegovy or Zepbound  I will be in touch regarding further testing for your gallbladder (HIDA scan)  Please start taking MiraLAX daily  Okay to stop iron, you may use it as needed during menstrual cycle  Blood work end of July  Try Klonopin PRN  You are due for mammography  in mid July  We will touch base after EGD/colonoscopy/further gallbladder testing and depending on med coverage

## 2024-06-13 DIAGNOSIS — K59.00 CONSTIPATION, UNSPECIFIED CONSTIPATION TYPE: ICD-10-CM

## 2024-06-13 DIAGNOSIS — E66.01 MORBID (SEVERE) OBESITY DUE TO EXCESS CALORIES (HCC): Primary | Chronic | ICD-10-CM

## 2024-06-13 PROBLEM — R10.11 RUQ PAIN: Status: ACTIVE | Noted: 2024-06-13

## 2024-06-13 PROBLEM — U07.1 COVID-19: Status: RESOLVED | Noted: 2023-12-14 | Resolved: 2024-06-13

## 2024-06-13 RX ORDER — POLYETHYLENE GLYCOL 3350 17 G/17G
17 POWDER, FOR SOLUTION ORAL DAILY
Qty: 255 G | Refills: 0 | Status: SHIPPED | OUTPATIENT
Start: 2024-06-13

## 2024-06-13 NOTE — ASSESSMENT & PLAN NOTE
Continue Effexor.  Ongoing symptoms of anxiety, marital stress, patient is frustrated with inability to lose weight.  Reports poor sleep, worsening of anxiety every evening.  Trial of Klonopin in the evening/bedtime PRN

## 2024-06-13 NOTE — ASSESSMENT & PLAN NOTE
Negative right upper quadrant ultrasound.  Persistent right upper quadrant pain.  Not related to meals.  No nausea or vomiting.  No improvement on Protonix.  Pending EGD/colonoscopy.  Strong family history of gallbladder disease.  Risk factors include age and obesity.  Proceed with HIDA scan

## 2024-06-13 NOTE — ASSESSMENT & PLAN NOTE
56 pound weight gain since October 2023.  Proceed with labs.  Encouraged to consume healthy diet.  No processed foods.  Minimize starches and desserts.  Patient will research current coverage of GLP-1 RA agonist.  We will consider treatment once GI workup is completed.  Alternatively may consider evaluation by bariatric surgery

## 2024-06-13 NOTE — ASSESSMENT & PLAN NOTE
Unclear etiology.  Mild elevation of creatinine.  Unremarkable kidney/bladder appearance on CT February 2024 .Blood pressure is well-controlled.  Continue monitoring.  Consider nephrology consult if elevation persists.  Advise drink plenty water, avoid/reduce NSAID use  omponent  Ref Range & Units 4/20/24  9:33 AM 2/16/24  8:31 AM 1/20/24  9:35 AM 12/2/23  8:55 AM 5/24/23  8:35 AM 11/18/22 12:00 AM 7/27/22  7:15 AM   Glucose, Random  70 - 99 mg/dL 84 74 78 75 101 High  R 85 R 86 R   BUN  6 - 24 mg/dL 14 17 16 17 17 R 12 R 16   Creatinine  0.57 - 1.00 mg/dL 1.17 High  1.14 High  1.23 High  1.11 High  0.93 R 0.92 R 0.90   eGFR  >59 mL/min/1.73 58 Low  60 55 Low  62 76 R 78 R 80   SL AMB BUN/CREATININE RATIO  9 - 23 12 15 13 15   18

## 2024-06-23 DIAGNOSIS — F41.8 DEPRESSION WITH ANXIETY: Chronic | ICD-10-CM

## 2024-06-24 RX ORDER — VENLAFAXINE HYDROCHLORIDE 37.5 MG/1
CAPSULE, EXTENDED RELEASE ORAL
Qty: 90 CAPSULE | Refills: 1 | Status: SHIPPED | OUTPATIENT
Start: 2024-06-24

## 2024-07-03 ENCOUNTER — ANESTHESIA EVENT (OUTPATIENT)
Dept: ANESTHESIOLOGY | Facility: HOSPITAL | Age: 49
End: 2024-07-03

## 2024-07-03 ENCOUNTER — ANESTHESIA (OUTPATIENT)
Dept: ANESTHESIOLOGY | Facility: HOSPITAL | Age: 49
End: 2024-07-03

## 2024-07-15 ENCOUNTER — PATIENT MESSAGE (OUTPATIENT)
Dept: FAMILY MEDICINE CLINIC | Facility: CLINIC | Age: 49
End: 2024-07-15

## 2024-07-16 ENCOUNTER — NURSE TRIAGE (OUTPATIENT)
Age: 49
End: 2024-07-16

## 2024-07-16 NOTE — PATIENT COMMUNICATION
Patient called back confirmed with patient  (no bad thoughts) she is nervous for colonoscopy scheduled for tomorrow and she said she is going through the separation.  She asked if the excuse letter can be written for 7/15 to return 7/22.  She wasn't sure how long she would need after colonoscopy?  Also, she started her menstrual cycle today.  The hot flashes are more frequent she gets the hot flashes about 1-2 times a day lasting about 5 minutes.    Thank you

## 2024-07-16 NOTE — PATIENT COMMUNICATION
Dr Rivera,     Is this letter ok to write for her to be out from 7/15/2024 and returning 7/22/2024?

## 2024-07-16 NOTE — TELEPHONE ENCOUNTER
----- Message from Klaudia S sent at 7/16/2024  9:33 AM EDT -----  Elvira Pearce   to  Primary [facility] (supporting Jerrica Beavers MD)   WW      7/16/24  3:45 AM  Hi again Dr. Rivera. It's 3:40 AM and I still haven't slept. I have my colonoscopy on Wednesday, so I'm sure that's keeping on my mind. I've been in bed for 3 days. It started as a headache and turned into one of my depression stages where I just want to stay in bed. I can already feel myself being useless tomorrow. I'm hoping you will see this early and i cab have an excuse constantin the 15th and 16th. I also am getting hot episodes.

## 2024-07-18 ENCOUNTER — TELEPHONE (OUTPATIENT)
Age: 49
End: 2024-07-18

## 2024-07-18 NOTE — TELEPHONE ENCOUNTER
Scheduled date of EGD/colonoscopy (as of today):9/16/24  Physician performing EGD/colonoscopy:DR JACKSON  Location of EGD/colonoscopy:[facility]  Desired bowel prep reviewed with patient:?  Instructions reviewed with patient by:DENNIS  Clearances:  NA    PT CANCELED HER 7/17/24 COLON/EGD BECAUSE SHE COULDN'T STAY AWAKE TO DRINK THE PREP. PT WOULD LIKE TO HAVE A DIFFERENT PREP OR KNOW IF SHE CAN DO IT IN A DIFFERENT WAY

## 2024-07-19 NOTE — TELEPHONE ENCOUNTER
I called and lvm for the patient advising of recommendations for Miralax/ Dulcolax prep. Updated instructions mailed & sent via [facility].

## 2024-07-24 DIAGNOSIS — E78.2 MIXED HYPERLIPIDEMIA: ICD-10-CM

## 2024-07-25 DIAGNOSIS — K59.00 CONSTIPATION, UNSPECIFIED CONSTIPATION TYPE: ICD-10-CM

## 2024-07-25 RX ORDER — ROSUVASTATIN CALCIUM 10 MG/1
10 TABLET, COATED ORAL DAILY
Qty: 90 TABLET | Refills: 1 | Status: SHIPPED | OUTPATIENT
Start: 2024-07-25

## 2024-07-25 NOTE — TELEPHONE ENCOUNTER
----- Message from Kristy Raines LPN sent at 0/70/2348  1:02 PM EST -----  Regarding: FW: Work excuse    ----- Message -----  From: Ingrid Manrique  Sent: 1/17/2022  11:20 AM EST  To: Kavon Galaviz [facility]  Subject: Work excuse                                      Dr Eva Kidd  Can you please include today from 6 on  I just started the steroid today for my jaw and I'm having alot of ear pain 
Note has been done and notified patient
Please provide work note as requested    Thank you
Statement Selected

## 2024-07-26 RX ORDER — POLYETHYLENE GLYCOL 3350 17 G/17G
POWDER, FOR SOLUTION ORAL
Qty: 238 G | Refills: 0 | Status: SHIPPED | OUTPATIENT
Start: 2024-07-26

## 2024-07-29 ENCOUNTER — TELEPHONE (OUTPATIENT)
Age: 49
End: 2024-07-29

## 2024-07-29 NOTE — TELEPHONE ENCOUNTER
Pt called regarding her message she sent on her portal about the intermittent FMLA paperwork. Advised pt Dr. Rivera is already aware they need to be signed, the office is just waiting on getting the paperwork. Pt is going to try and scan the documents into another message through her portal and have the office fax it back to wherever it needs to. Please be on the lookout; pt does not have the fax # in front of her but will be in touch regarding it.

## 2024-07-30 NOTE — TELEPHONE ENCOUNTER
Initial FMAL Paperwork has been completed by the MA and placed on PCP desk for review and signature. There is a $30 charge for this paperwork.    Once completed, please return to  for scanning/billing.

## 2024-08-09 ENCOUNTER — TELEPHONE (OUTPATIENT)
Dept: FAMILY MEDICINE CLINIC | Facility: CLINIC | Age: 49
End: 2024-08-09

## 2024-08-09 NOTE — TELEPHONE ENCOUNTER
Patient called and stated that the last time she was in you and her discussed possibly changing her Effexor to something else.  She wanted to let you know that she has been noticing that he symptoms of depression seem to get worse when she has her period.  She stated that she has severe headaches, crazy depression and not wanting to get out of bed, which she says is rina but worse when she has her monthly,  She said she is just in a funk.  She would like to know if you feel she should change her depression medication to something else that you discussed?  Please call to advise

## 2024-08-14 ENCOUNTER — TELEPHONE (OUTPATIENT)
Age: 49
End: 2024-08-14

## 2024-08-14 DIAGNOSIS — E66.01 MORBID (SEVERE) OBESITY DUE TO EXCESS CALORIES (HCC): Primary | Chronic | ICD-10-CM

## 2024-08-14 NOTE — TELEPHONE ENCOUNTER
Patient called the RX Refill Line. Message is being forwarded to the office.     Patient is requesting a refill for wegovy. The medication is not on current medication list. Patient stated that this was prescribed by Jerrica Beavers MD back in December of last year but has not been able to get it filled due to the cost. Patient was told that [facility] has a reasonable price and would like to have it sent there so that she can finally try this medication. Please review and send script to pharmacy if appropriate.    Patient can be contacted at [phone]

## 2024-08-15 RX ORDER — SEMAGLUTIDE 0.25 MG/.5ML
INJECTION, SOLUTION SUBCUTANEOUS
Qty: 2 ML | Refills: 0 | Status: SHIPPED | OUTPATIENT
Start: 2024-08-15

## 2024-08-16 DIAGNOSIS — G43.709 CHRONIC MIGRAINE WITHOUT AURA WITHOUT STATUS MIGRAINOSUS, NOT INTRACTABLE: ICD-10-CM

## 2024-08-18 RX ORDER — TOPIRAMATE 50 MG/1
TABLET, FILM COATED ORAL
Qty: 90 TABLET | Refills: 1 | Status: SHIPPED | OUTPATIENT
Start: 2024-08-18

## 2024-08-19 ENCOUNTER — ANESTHESIA (OUTPATIENT)
Dept: ANESTHESIOLOGY | Facility: HOSPITAL | Age: 49
End: 2024-08-19

## 2024-08-19 ENCOUNTER — TELEPHONE (OUTPATIENT)
Dept: FAMILY MEDICINE CLINIC | Facility: CLINIC | Age: 49
End: 2024-08-19

## 2024-08-19 ENCOUNTER — ANESTHESIA EVENT (OUTPATIENT)
Dept: ANESTHESIOLOGY | Facility: HOSPITAL | Age: 49
End: 2024-08-19

## 2024-08-26 ENCOUNTER — OFFICE VISIT (OUTPATIENT)
Dept: FAMILY MEDICINE CLINIC | Facility: CLINIC | Age: 49
End: 2024-08-26
Payer: COMMERCIAL

## 2024-08-26 VITALS
TEMPERATURE: 97.8 F | HEART RATE: 92 BPM | HEIGHT: 64 IN | RESPIRATION RATE: 18 BRPM | OXYGEN SATURATION: 97 % | SYSTOLIC BLOOD PRESSURE: 124 MMHG | BODY MASS INDEX: 42.37 KG/M2 | WEIGHT: 248.2 LBS | DIASTOLIC BLOOD PRESSURE: 76 MMHG

## 2024-08-26 DIAGNOSIS — N62 MACROMASTIA: ICD-10-CM

## 2024-08-26 DIAGNOSIS — M54.9 PAIN, UPPER BACK: ICD-10-CM

## 2024-08-26 DIAGNOSIS — E78.2 MIXED HYPERLIPIDEMIA: ICD-10-CM

## 2024-08-26 DIAGNOSIS — F41.8 DEPRESSION WITH ANXIETY: Primary | Chronic | ICD-10-CM

## 2024-08-26 DIAGNOSIS — E66.01 MORBID (SEVERE) OBESITY DUE TO EXCESS CALORIES (HCC): Chronic | ICD-10-CM

## 2024-08-26 PROCEDURE — 99214 OFFICE O/P EST MOD 30 MIN: CPT | Performed by: FAMILY MEDICINE

## 2024-08-26 RX ORDER — BUPROPION HYDROCHLORIDE 150 MG/1
150 TABLET ORAL EVERY MORNING
Qty: 30 TABLET | Refills: 1 | Status: SHIPPED | OUTPATIENT
Start: 2024-08-26

## 2024-08-26 NOTE — PATIENT INSTRUCTIONS
Dose of Effexor ER will be 150 mg daily  New medication Wellbutrin  mg daily in am  Hello Fresh or NutriSystem  Topamax at night   mammography

## 2024-08-26 NOTE — PROGRESS NOTES
Ambulatory Visit  Name: Elvira Pearce      : 1975      MRN: [id number]  Encounter Provider: Jerrica Beavers MD  Encounter Date: 2024   Encounter department: [facility]    Assessment & Plan   1. Depression with anxiety  Assessment & Plan:  Persistent symptoms of depression.  Anxiety symptoms have been fairly well-controlled lately.  Patient uses Klonopin PRN.  Ongoing marital stress, patient is  from her  but share the same household.  Currently on Effexor +37.5 mg daily.  I recommend to reduce dose back to 150 mg daily and add Wellbutrin  mg in the morning.  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  Reports no cravings for EtOH  Orders:  -     buPROPion (WELLBUTRIN XL) 150 mg 24 hr tablet; Take 1 tablet (150 mg total) by mouth every morning  2. Morbid (severe) obesity due to excess calories (HCC)  Assessment & Plan:  Patient has been struggling with excess weight and significant food cravings.  I am hopeful that Wellbutrin will help.  She remains on Topamax.  Unfortunately, her insurance declined our claims for Wegovy/Zepbound.  I encourage patient to adhere to healthy diet and start regular physical activity.  We discussed importance of portion control and avoidance of late-night eating.  3. Mixed hyperlipidemia  Assessment & Plan:  Continue rosuvastatin  4. Pain, upper back  Comments:  Home PT, stretching exercises, supportive bra  5. Macromastia  Assessment & Plan:  Patient suffers from chronic upper mid back pain due to macromastia.  Painful bra indentation.  Bra size is over double D.  Patient is likely good candidate for breast reduction.     Patient Instructions   Dose of Effexor ER will be 150 mg daily  New medication Wellbutrin  mg daily in am  Hello Fresh or NutriSystem  Topamax at night   mammography    Return in about 6 weeks (around 10/7/2024).    History of Present Illness     Patient presents for  follow-up.  She has been battling persistent depression.  Anxiety symptoms have been fairly well-controlled.  Patient is currently on Effexor  mg daily along with additional 37.5 mg daily.  She takes Klonopin as needed.She has been sober for over a year.  I commended her on this achievement.  She denies any alcohol cravings but admits that she has been craving sweets within past year.  Abdominal pain has resolved.  No constipation.  Patient reports lack of motivation and drive.  Sleep is fairly normal, she tries to sleep slightly more.  Ongoing stress.  She is , living in the same household with her  and their son.      Ongoing struggle with weight.  Patient is quite frustrated.  Her insurance did not approve Wegovy or Zepbound that we have requested.    There is room to improve exercise and patient is motivated to start walking as the weather is cooling down.    Patient is also complaining of upper back pain and mid back pain.  Macromastia.  Very painful indentations from the bra straps.  Persistent upper back spasm.  Patient's bra size is over double D.  Patient might be interested in breast reduction going forward.  She is due for mammogram and will be proceeding shortly.      Review of Systems   Constitutional:  Positive for fatigue.   HENT: Negative.     Respiratory: Negative.     Cardiovascular: Negative.    Gastrointestinal: Negative.    Genitourinary: Negative.    Musculoskeletal:  Positive for back pain and neck pain.        Upper back pain, macromastia   Neurological: Negative.    Psychiatric/Behavioral:  Positive for dysphoric mood. Negative for sleep disturbance and suicidal ideas. The patient is nervous/anxious.      Past Medical History:   Diagnosis Date   • Anxiety    • Depression    • Headache(784.0)    • Obesity      Past Surgical History:   Procedure Laterality Date   • NO PAST SURGERIES       Family History   Problem Relation Age of Onset   • Diabetes Mother    • Cerebral  aneurysm Mother    • Hypertension Mother    • Heart attack Father 62   • Hypertension Brother    • Breast cancer Maternal Aunt    • Substance Abuse Brother    • Thyroid disease Brother    • Breast cancer Maternal Aunt      Social History     Tobacco Use   • Smoking status: Former     Current packs/day: 0.00     Types: Cigarettes     Start date: 2018     Quit date: 3/30/2020     Years since quittin.4   • Smokeless tobacco: Never   Vaping Use   • Vaping status: Every Day   • Substances: Nicotine   Substance and Sexual Activity   • Alcohol use: Not Currently     Comment: Social   • Drug use: Not Currently     Types: Marijuana   • Sexual activity: Yes     Partners: Male     Birth control/protection: Other     Current Outpatient Medications on File Prior to Visit   Medication Sig   • clonazePAM (KlonoPIN) 0.5 mg tablet Take 1 or 2 tablets at bedtime as needed for anxiety/insomnia   • ketorolac (TORADOL) 10 mg tablet Take 1 tablet (10 mg total) by mouth daily as needed for moderate pain or severe pain (Headache)   • pantoprazole (PROTONIX) 40 mg tablet Take 1 tablet (40 mg total) by mouth daily   • polyethylene glycol (GLYCOLAX) 17 GM/SCOOP powder TAKE 17 GRAMS BY MOUTH EVERY DAY   • polyethylene glycol (GOLYTELY) 4000 mL solution Take as instructed on bowel preparation instructions   • rizatriptan (MAXALT-MLT) 10 mg disintegrating tablet Take at the onset of migraine; if symptoms continue or return, may take another dose at least 2 hours after first dose. Take no more than 2 doses in a day.   • rosuvastatin (CRESTOR) 10 MG tablet Take 1 tablet (10 mg total) by mouth daily   • Semaglutide-Weight Management (Wegovy) 0.25 MG/0.5ML Inject 0.25 mg under the skin weekly   • topiramate (TOPAMAX) 100 mg tablet TAKE 1 TABLET BY MOUTH AT  BEDTIME   • topiramate (TOPAMAX) 50 MG tablet TAKE 1 TABLET BY MOUTH DAILY   • valACYclovir (VALTREX) 500 mg tablet Take 500 mg by mouth   • venlafaxine (EFFEXOR-XR) 150 mg 24 hr capsule  "TAKE 1 CAPSULE BY MOUTH DAILY     Allergies   Allergen Reactions   • Clarithromycin Other (See Comments)     nausea   • Sulfa Antibiotics GI Intolerance   • Sulfamethoxazole-Trimethoprim Other (See Comments)     rash     Immunization History   Administered Date(s) Administered   • COVID-19 PFIZER VACCINE 0.3 ML IM 01/10/2021, 01/29/2021   • COVID-19 Pfizer vac (Akira-sucrose, gray cap) 12 yr+ IM 03/19/2022   • INFLUENZA 10/16/2015, 10/31/2018, 10/05/2019, 11/08/2020, 11/30/2021, 10/03/2023   • Influenza, injectable, quadrivalent, preservative free 0.5 mL 11/30/2021, 10/03/2023   • Influenza, seasonal, injectable 10/06/2016     Objective     /76   Pulse 92   Temp 97.8 °F (36.6 °C) (Temporal)   Resp 18   Ht 5' 4\" (1.626 m)   Wt 113 kg (248 lb 3.2 oz)   LMP  (LMP Unknown)   SpO2 97%   BMI 42.60 kg/m²     Physical Exam  Vitals and nursing note reviewed.   Constitutional:       General: She is not in acute distress.     Appearance: Normal appearance. She is well-developed. She is not ill-appearing.   HENT:      Head: Normocephalic and atraumatic.   Eyes:      Conjunctiva/sclera: Conjunctivae normal.   Neck:      Thyroid: No thyromegaly.      Vascular: No carotid bruit.   Cardiovascular:      Rate and Rhythm: Normal rate and regular rhythm.      Heart sounds: Normal heart sounds. No murmur heard.  Pulmonary:      Effort: Pulmonary effort is normal. No respiratory distress.      Breath sounds: Normal breath sounds. No wheezing.   Abdominal:      General: There is no abdominal bruit.   Musculoskeletal:         General: Normal range of motion.      Cervical back: Neck supple.      Comments: Paraspinal cervical, trapezius and thoracic spine spasm.  Deep indentations from bra straps, painful.  Macromastia   Neurological:      General: No focal deficit present.      Mental Status: She is alert and oriented to person, place, and time.      Cranial Nerves: No cranial nerve deficit.      Coordination: Coordination " normal.   Psychiatric:         Attention and Perception: Attention normal.         Mood and Affect: Mood is anxious and depressed.         Behavior: Behavior normal.

## 2024-08-28 ENCOUNTER — TELEPHONE (OUTPATIENT)
Age: 49
End: 2024-08-28

## 2024-08-28 NOTE — TELEPHONE ENCOUNTER
Patient called because she would like for Dr. Beavers to reach out to her in regards to FMLA and short term disability leave. Patient states this is an urgent matter.        Please review and advise.

## 2024-08-29 PROBLEM — M54.9 PAIN, UPPER BACK: Status: ACTIVE | Noted: 2024-08-29

## 2024-08-29 PROBLEM — N62 MACROMASTIA: Status: ACTIVE | Noted: 2024-08-29

## 2024-08-29 NOTE — ASSESSMENT & PLAN NOTE
Persistent symptoms of depression.  Anxiety symptoms have been fairly well-controlled lately.  Patient uses Klonopin PRN.  Ongoing marital stress, patient is  from her  but share the same household.  Currently on Effexor +37.5 mg daily.  I recommend to reduce dose back to 150 mg daily and add Wellbutrin  mg in the morning.  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  Reports no cravings for EtOH

## 2024-08-29 NOTE — ASSESSMENT & PLAN NOTE
Patient suffers from chronic upper mid back pain due to macromastia.  Painful bra indentation.  Bra size is over double D.  Patient is likely good candidate for breast reduction.

## 2024-08-29 NOTE — TELEPHONE ENCOUNTER
Spoke with Elvira and what she was asking is if you would fill out some short term disability for her to get things in order.  She is very emotional with going through the divorce and would like to take a few weeks off to take care of everything she needs too.  She said she is emotionally not with it during work and is unable to concentrate due to this.  She stated that if you have any questions to please contact her.  I provided fax number for her company to send everything over.  Please call to advise

## 2024-08-29 NOTE — ASSESSMENT & PLAN NOTE
Patient has been struggling with excess weight and significant food cravings.  I am hopeful that Wellbutrin will help.  She remains on Topamax.  Unfortunately, her insurance declined our claims for Wegovy/Zepbound.  I encourage patient to adhere to healthy diet and start regular physical activity.  We discussed importance of portion control and avoidance of late-night eating.

## 2024-08-29 NOTE — TELEPHONE ENCOUNTER
"Pt called, following up on call from yesterday regarding FMLA options. Pt has not heard back from Provider. Pt reports \"I am about to lose my mind.\" Pt states \"I am afraid I'm going to lose my job.\" Pt reports increased stress, mentioned divorce and not having internet today for at home job. Pt reports currently has temporary intermittent leave, but isn't sure that will be enough right now.    Pt is requesting a call back from Provider to discuss options. Please inform PCP and return call. Pt will call back if she doesn't hear anything by 3 pm today. Thank you  "

## 2024-08-29 NOTE — TELEPHONE ENCOUNTER
Lm for patient to call me back to find out if she is looking for the OLD FMLA or did she provide Dr with new ones at the appointment on Monday

## 2024-09-03 NOTE — TELEPHONE ENCOUNTER
Spoke to patient will have them re faxed to office, patient is requesting hormone level blood work, patient thinks she is starting mesopause

## 2024-09-03 NOTE — TELEPHONE ENCOUNTER
Pt called to advise that she will be sending another set of blank copies of [facility] paperwork via [employer]. Pt will need to know the exact date Dr. Beavers is clearing her to go back to work, and is requesting a call back from Eileen.    Please contact to advise.

## 2024-09-04 ENCOUNTER — TELEPHONE (OUTPATIENT)
Age: 49
End: 2024-09-04

## 2024-09-04 DIAGNOSIS — K59.00 CONSTIPATION, UNSPECIFIED CONSTIPATION TYPE: ICD-10-CM

## 2024-09-04 NOTE — TELEPHONE ENCOUNTER
Pt uploaded her FMLA forms for physician to complete. I did let her know that it takes a few days for forms to be ready, she understands but said they need to be done by 9/13.    The return fax number is on the top of the first page.     [employer]   [phone]     Thanks

## 2024-09-04 NOTE — TELEPHONE ENCOUNTER
Candy Viera1 hour ago (4:11 PM)     SD  FMLA Thurs 8/29 - 9/27  Returning on intermittent  - 9/30      10/2024 follow up appt. with doctor         Note        Elvira Pearce [phone]  Candy Viera1 hour ago (4:08 PM)     Eileen Pinto MA1 hour ago (3:53 PM)     CV  Forms received          Note         Blanca Ruiz routed conversation to [facility]1 hour ago (3:48 PM)     Elvira Pearce [phone]  Blanca Mezas1 hour ago (3:47 PM)     Blanca Mezas1 hour ago (3:47 PM)     DM  Pt uploaded her FMLA forms for physician to complete. I did let her know that it takes a few days for forms to be ready, she understands but said they need to be done by 9/13.     The return fax number is on the top of the first page.      [employer]   [phone]      Thanks

## 2024-09-04 NOTE — TELEPHONE ENCOUNTER
WING [employer] 8/29 - 9/27  Returning on intermittent  - 9/30     10/2024 follow up appt. with doctor

## 2024-09-04 NOTE — TELEPHONE ENCOUNTER
We agreed on short-term FMLA.  Patient should clarify the start date of her short-term disability, we will fill out the paperwork for 4 weeks for the time being.

## 2024-09-05 RX ORDER — POLYETHYLENE GLYCOL 3350 17 G/17G
POWDER, FOR SOLUTION ORAL
Qty: 510 G | Refills: 5 | Status: SHIPPED | OUTPATIENT
Start: 2024-09-05

## 2024-09-10 DIAGNOSIS — E66.01 MORBID (SEVERE) OBESITY DUE TO EXCESS CALORIES (HCC): Primary | Chronic | ICD-10-CM

## 2024-09-10 RX ORDER — NALTREXONE HYDROCHLORIDE 50 MG/1
25 TABLET, FILM COATED ORAL DAILY
Qty: 15 TABLET | Refills: 2 | Status: SHIPPED | OUTPATIENT
Start: 2024-09-10

## 2024-09-12 ENCOUNTER — PATIENT MESSAGE (OUTPATIENT)
Dept: FAMILY MEDICINE CLINIC | Facility: CLINIC | Age: 49
End: 2024-09-12

## 2024-09-15 RX ORDER — SODIUM CHLORIDE, SODIUM LACTATE, POTASSIUM CHLORIDE, CALCIUM CHLORIDE 600; 310; 30; 20 MG/100ML; MG/100ML; MG/100ML; MG/100ML
125 INJECTION, SOLUTION INTRAVENOUS CONTINUOUS
Status: CANCELLED | OUTPATIENT
Start: 2024-09-15

## 2024-09-15 RX ORDER — LIDOCAINE HYDROCHLORIDE 10 MG/ML
0.5 INJECTION, SOLUTION EPIDURAL; INFILTRATION; INTRACAUDAL; PERINEURAL ONCE AS NEEDED
Status: CANCELLED | OUTPATIENT
Start: 2024-09-15

## 2024-09-16 ENCOUNTER — ANESTHESIA EVENT (OUTPATIENT)
Dept: GASTROENTEROLOGY | Facility: AMBULARY SURGERY CENTER | Age: 49
End: 2024-09-16
Payer: COMMERCIAL

## 2024-09-16 ENCOUNTER — HOSPITAL ENCOUNTER (OUTPATIENT)
Dept: GASTROENTEROLOGY | Facility: AMBULARY SURGERY CENTER | Age: 49
Setting detail: OUTPATIENT SURGERY
Discharge: HOME | End: 2024-09-16
Attending: INTERNAL MEDICINE
Payer: COMMERCIAL

## 2024-09-16 VITALS
HEIGHT: 64 IN | RESPIRATION RATE: 20 BRPM | TEMPERATURE: 98.3 F | BODY MASS INDEX: 40.8 KG/M2 | WEIGHT: 239 LBS | HEART RATE: 68 BPM | SYSTOLIC BLOOD PRESSURE: 117 MMHG | DIASTOLIC BLOOD PRESSURE: 51 MMHG | OXYGEN SATURATION: 99 %

## 2024-09-16 DIAGNOSIS — Z12.11 SCREENING FOR COLON CANCER: ICD-10-CM

## 2024-09-16 DIAGNOSIS — G43.709 CHRONIC MIGRAINE WITHOUT AURA WITHOUT STATUS MIGRAINOSUS, NOT INTRACTABLE: ICD-10-CM

## 2024-09-16 DIAGNOSIS — R10.11 RUQ PAIN: ICD-10-CM

## 2024-09-16 LAB
EXT PREGNANCY TEST URINE: NEGATIVE
EXT. CONTROL: NORMAL

## 2024-09-16 PROCEDURE — 43239 EGD BIOPSY SINGLE/MULTIPLE: CPT | Performed by: INTERNAL MEDICINE

## 2024-09-16 PROCEDURE — G0121 COLON CA SCRN NOT HI RSK IND: HCPCS | Performed by: INTERNAL MEDICINE

## 2024-09-16 PROCEDURE — 88305 TISSUE EXAM BY PATHOLOGIST: CPT | Performed by: PATHOLOGY

## 2024-09-16 RX ORDER — PROPOFOL 10 MG/ML
INJECTION, EMULSION INTRAVENOUS AS NEEDED
Status: DISCONTINUED | OUTPATIENT
Start: 2024-09-16 | End: 2024-09-16

## 2024-09-16 RX ORDER — PROPOFOL 10 MG/ML
INJECTION, EMULSION INTRAVENOUS CONTINUOUS PRN
Status: DISCONTINUED | OUTPATIENT
Start: 2024-09-16 | End: 2024-09-16

## 2024-09-16 RX ORDER — SODIUM CHLORIDE, SODIUM LACTATE, POTASSIUM CHLORIDE, CALCIUM CHLORIDE 600; 310; 30; 20 MG/100ML; MG/100ML; MG/100ML; MG/100ML
125 INJECTION, SOLUTION INTRAVENOUS CONTINUOUS
Status: DISCONTINUED | OUTPATIENT
Start: 2024-09-16 | End: 2024-09-20 | Stop reason: HOSPADM

## 2024-09-16 RX ORDER — LIDOCAINE HYDROCHLORIDE 20 MG/ML
INJECTION, SOLUTION EPIDURAL; INFILTRATION; INTRACAUDAL; PERINEURAL AS NEEDED
Status: DISCONTINUED | OUTPATIENT
Start: 2024-09-16 | End: 2024-09-16

## 2024-09-16 RX ORDER — LIDOCAINE HYDROCHLORIDE 10 MG/ML
0.5 INJECTION, SOLUTION EPIDURAL; INFILTRATION; INTRACAUDAL; PERINEURAL ONCE AS NEEDED
Status: DISCONTINUED | OUTPATIENT
Start: 2024-09-16 | End: 2024-09-20 | Stop reason: HOSPADM

## 2024-09-16 RX ORDER — SODIUM CHLORIDE, SODIUM LACTATE, POTASSIUM CHLORIDE, CALCIUM CHLORIDE 600; 310; 30; 20 MG/100ML; MG/100ML; MG/100ML; MG/100ML
INJECTION, SOLUTION INTRAVENOUS CONTINUOUS PRN
Status: DISCONTINUED | OUTPATIENT
Start: 2024-09-16 | End: 2024-09-16

## 2024-09-16 RX ADMIN — PROPOFOL 100 MG: 10 INJECTION, EMULSION INTRAVENOUS at 11:48

## 2024-09-16 RX ADMIN — LIDOCAINE HYDROCHLORIDE 100 MG: 20 INJECTION, SOLUTION EPIDURAL; INFILTRATION; INTRACAUDAL at 11:46

## 2024-09-16 RX ADMIN — PROPOFOL 200 MG: 10 INJECTION, EMULSION INTRAVENOUS at 11:46

## 2024-09-16 RX ADMIN — PROPOFOL 100 MG: 10 INJECTION, EMULSION INTRAVENOUS at 11:49

## 2024-09-16 RX ADMIN — PROPOFOL 140 MCG/KG/MIN: 10 INJECTION, EMULSION INTRAVENOUS at 11:54

## 2024-09-16 RX ADMIN — SODIUM CHLORIDE, SODIUM LACTATE, POTASSIUM CHLORIDE, AND CALCIUM CHLORIDE: .6; .31; .03; .02 INJECTION, SOLUTION INTRAVENOUS at 11:37

## 2024-09-16 RX ADMIN — PROPOFOL 100 MG: 10 INJECTION, EMULSION INTRAVENOUS at 11:47

## 2024-09-16 RX ADMIN — PROPOFOL 100 MG: 10 INJECTION, EMULSION INTRAVENOUS at 11:50

## 2024-09-16 RX ADMIN — SODIUM CHLORIDE, SODIUM LACTATE, POTASSIUM CHLORIDE, AND CALCIUM CHLORIDE: .6; .31; .03; .02 INJECTION, SOLUTION INTRAVENOUS at 12:14

## 2024-09-16 RX ADMIN — Medication 40 MG: at 12:03

## 2024-09-16 NOTE — ANESTHESIA POSTPROCEDURE EVALUATION
Post-Op Assessment Note    CV Status:  Stable  Pain Score: 0    Pain management: adequate       Mental Status:  Sleepy   Hydration Status:  Stable   PONV Controlled:  None   Airway Patency:  Patent  Two or more mitigation strategies used for obstructive sleep apnea   Post Op Vitals Reviewed: Yes    No anethesia notable event occurred.    Staff: CRNA               BP   101/56   Temp 98   Pulse 70   Resp 16   SpO2 99

## 2024-09-16 NOTE — ANESTHESIA PREPROCEDURE EVALUATION
Procedure:  COLONOSCOPY  EGD    Relevant Problems   CARDIO   (+) Headache, migraine   (+) Mixed hyperlipidemia      GI/HEPATIC   (+) Esophageal reflux      MUSCULOSKELETAL   (+) Pain, upper back      NEURO/PSYCH   (+) Depression with anxiety   (+) Headache, migraine      Other   (+) Morbid (severe) obesity due to excess calories (HCC)        Physical Exam    Airway    Mallampati score: III  TM Distance: >3 FB  Neck ROM: full     Dental   No notable dental hx     Cardiovascular  Rhythm: regular, Rate: normal, Cardiovascular exam normal    Pulmonary  Pulmonary exam normal Breath sounds clear to auscultation    Other Findings  post-pubertal.      Anesthesia Plan  ASA Score- 3     Anesthesia Type- IV sedation with anesthesia with ASA Monitors.         Additional Monitors:     Airway Plan:     Comment: Discussed risks/benefits, including medication reactions, awareness, aspiration, and serious/life threatening complications. Plan to maintain native airway with IVGA, monitored with EtCO2.       Plan Factors-Exercise tolerance (METS): >4 METS.    Chart reviewed.    Patient summary reviewed.      Patient instructed to abstain from smoking on day of procedure. Patient did not smoke on day of surgery.            Induction- intravenous.    Postoperative Plan-         Informed Consent- Anesthetic plan and risks discussed with patient.  I personally reviewed this patient with the CRNA. Discussed and agreed on the Anesthesia Plan with the CRNA..

## 2024-09-16 NOTE — H&P
[facility]  History & Physical    Patient Info:  Name: Elvira Pearce   Age: 49 y.o.   YOB: 1975   MRN: [id number]    HPI: Elvira Pearce is a 49 y.o. year old female who presents for EGD and Colonoscopy for evaluation of RUQ abdominal pain, bloating, constipation, as well as colorectal cancer screening,     REVIEW OF SYSTEMS: Per the HPI, and otherwise unremarkable.    Historical Information   Past Medical History:   Diagnosis Date    Anxiety     Depression     Headache(784.0)     Obesity      Past Surgical History:   Procedure Laterality Date    NO PAST SURGERIES       Social History   Social History     Substance and Sexual Activity   Alcohol Use Not Currently    Comment: Social     Social History     Substance and Sexual Activity   Drug Use Not Currently    Types: Marijuana     Social History     Tobacco Use   Smoking Status Former    Current packs/day: 0.00    Types: Cigarettes    Start date: 2018    Quit date: 3/30/2020    Years since quittin.4   Smokeless Tobacco Never     Family History   Problem Relation Age of Onset    Diabetes Mother     Cerebral aneurysm Mother     Hypertension Mother     Heart attack Father 62    Hypertension Brother     Breast cancer Maternal Aunt     Substance Abuse Brother     Thyroid disease Brother     Breast cancer Maternal Aunt        MEDICATIONS & ALLERGIES:    Current Outpatient Medications   Medication Instructions    buPROPion (WELLBUTRIN XL) 150 mg, Oral, Every morning    clonazePAM (KlonoPIN) 0.5 mg tablet Take 1 or 2 tablets at bedtime as needed for anxiety/insomnia    ketorolac (TORADOL) 10 mg, Oral, Daily PRN    naltrexone (REVIA) 25 mg, Oral, Daily    pantoprazole (PROTONIX) 40 mg, Oral, Daily    polyethylene glycol (GLYCOLAX) 17 GM/SCOOP powder TAKE 17 GRAMS BY MOUTH EVERY DAY    polyethylene glycol (GOLYTELY) 4000 mL solution Take as instructed on bowel preparation instructions    rizatriptan (MAXALT-MLT) 10 mg  "disintegrating tablet Take at the onset of migraine; if symptoms continue or return, may take another dose at least 2 hours after first dose. Take no more than 2 doses in a day.    rosuvastatin (CRESTOR) 10 mg, Oral, Daily    Semaglutide-Weight Management (Wegovy) 0.25 MG/0.5ML Inject 0.25 mg under the skin weekly    topiramate (TOPAMAX) 100 mg tablet TAKE 1 TABLET BY MOUTH AT  BEDTIME    valACYclovir (VALTREX) 500 mg, Oral    venlafaxine (EFFEXOR-XR) 150 mg 24 hr capsule TAKE 1 CAPSULE BY MOUTH DAILY     Allergies   Allergen Reactions    Clarithromycin Other (See Comments)     nausea    Sulfa Antibiotics GI Intolerance    Sulfamethoxazole-Trimethoprim Other (See Comments)     rash       PHYSICAL EXAM:    Objective   Blood pressure 113/66, pulse 82, temperature 98.3 °F (36.8 °C), temperature source Temporal, resp. rate 20, height 5' 4\" (1.626 m), weight 108 kg (239 lb), last menstrual period 09/02/2024, SpO2 100%. Body mass index is 41.02 kg/m².    Gen: NAD  CV: RRR  CHEST: Clear  ABD: Soft, NT/ND  EXT: No edema    ASSESSMENT AND PLAN:  This is a 49 y.o. year old female here for EGD and Colonoscopy, and she is stable and optimized for her procedure.      Tram Colmenares D.O.  Gastroenterology Fellow  [facility]  Division of Gastroenterology & Hepatology  Available on [employer]    ** Please Note: This note is constructed using a voice recognition dictation system. **   "

## 2024-09-17 ENCOUNTER — TELEPHONE (OUTPATIENT)
Age: 49
End: 2024-09-17

## 2024-09-17 NOTE — TELEPHONE ENCOUNTER
Patient called stating that there was information missing from her [facility] paperwork that was sent to Frank.  Part three of continuous leave form was not completed.  Patient would like to speak to someone in regards to paperwork.  Please call patient.

## 2024-09-18 RX ORDER — TOPIRAMATE 100 MG/1
TABLET, FILM COATED ORAL
Qty: 90 TABLET | Refills: 1 | Status: SHIPPED | OUTPATIENT
Start: 2024-09-18

## 2024-09-18 NOTE — TELEPHONE ENCOUNTER
Patient stated wanting to speak with Pham.  She was not available at the time of this call.  Pt stated there's paperwork Dr Hernández's filled out for me that there's a spot missing that wasn't completed.  It's very time sensitive and she could get fired.  It's not the [employer] paperwork it's the original paperwork. But she does need both the Emblem and FMLA filled out.  Please call her back

## 2024-09-19 PROCEDURE — 88305 TISSUE EXAM BY PATHOLOGIST: CPT | Performed by: PATHOLOGY

## 2024-09-21 ENCOUNTER — PATIENT MESSAGE (OUTPATIENT)
Dept: FAMILY MEDICINE CLINIC | Facility: CLINIC | Age: 49
End: 2024-09-21

## 2024-09-24 NOTE — TELEPHONE ENCOUNTER
Tamanna/ [employer] is stating they need more information. OV notes, if she has seen a therapist, and more... Patient would like for us to reach out to them to see exactly what else they need. Ph. [phone]. Please assist.

## 2024-09-26 ENCOUNTER — PATIENT MESSAGE (OUTPATIENT)
Dept: FAMILY MEDICINE CLINIC | Facility: CLINIC | Age: 49
End: 2024-09-26

## 2024-10-02 ENCOUNTER — PATIENT MESSAGE (OUTPATIENT)
Dept: FAMILY MEDICINE CLINIC | Facility: CLINIC | Age: 49
End: 2024-10-02

## 2024-10-08 ENCOUNTER — TELEPHONE (OUTPATIENT)
Age: 49
End: 2024-10-08

## 2024-10-08 ENCOUNTER — PATIENT MESSAGE (OUTPATIENT)
Dept: FAMILY MEDICINE CLINIC | Facility: CLINIC | Age: 49
End: 2024-10-08

## 2024-10-08 NOTE — TELEPHONE ENCOUNTER
Patient had called in and stated that she needed to reschedule her 6 week follow up appointment with Dr. Rivera.     Dr. Rivera doesn't have any openings until December, and patient is only requesting this provider.     Patient also needs to speak with someone in regards to her [place] papers, and was hoping someone could advise back out too her.

## 2024-10-18 ENCOUNTER — TELEPHONE (OUTPATIENT)
Dept: PSYCHOLOGY | Facility: CLINIC | Age: 49
End: 2024-10-18

## 2024-10-18 ENCOUNTER — OFFICE VISIT (OUTPATIENT)
Dept: FAMILY MEDICINE CLINIC | Facility: CLINIC | Age: 49
End: 2024-10-18
Payer: COMMERCIAL

## 2024-10-18 ENCOUNTER — APPOINTMENT (OUTPATIENT)
Dept: LAB | Facility: CLINIC | Age: 49
End: 2024-10-18
Payer: COMMERCIAL

## 2024-10-18 VITALS
RESPIRATION RATE: 18 BRPM | SYSTOLIC BLOOD PRESSURE: 126 MMHG | HEART RATE: 95 BPM | BODY MASS INDEX: 41.23 KG/M2 | OXYGEN SATURATION: 99 % | WEIGHT: 240.2 LBS | DIASTOLIC BLOOD PRESSURE: 84 MMHG

## 2024-10-18 DIAGNOSIS — E78.2 MIXED HYPERLIPIDEMIA: ICD-10-CM

## 2024-10-18 DIAGNOSIS — IMO0002 CREATININE ELEVATION: ICD-10-CM

## 2024-10-18 DIAGNOSIS — R53.83 OTHER FATIGUE: ICD-10-CM

## 2024-10-18 DIAGNOSIS — F41.8 DEPRESSION WITH ANXIETY: Primary | ICD-10-CM

## 2024-10-18 DIAGNOSIS — E66.9 OBESITY (BMI 30-39.9): Chronic | ICD-10-CM

## 2024-10-18 DIAGNOSIS — E78.2 MODERATE MIXED HYPERLIPIDEMIA NOT REQUIRING STATIN THERAPY: ICD-10-CM

## 2024-10-18 DIAGNOSIS — D25.9 UTERINE LEIOMYOMA, UNSPECIFIED LOCATION: ICD-10-CM

## 2024-10-18 DIAGNOSIS — E66.01 MORBID (SEVERE) OBESITY DUE TO EXCESS CALORIES (HCC): Chronic | ICD-10-CM

## 2024-10-18 DIAGNOSIS — R10.11 RUQ PAIN: ICD-10-CM

## 2024-10-18 DIAGNOSIS — E53.8 VITAMIN B12 DEFICIENCY: ICD-10-CM

## 2024-10-18 LAB
ALBUMIN SERPL BCG-MCNC: 4.4 G/DL (ref 3.5–5)
ALP SERPL-CCNC: 73 U/L (ref 34–104)
ALT SERPL W P-5'-P-CCNC: 9 U/L (ref 7–52)
ANION GAP SERPL CALCULATED.3IONS-SCNC: 8 MMOL/L (ref 4–13)
AST SERPL W P-5'-P-CCNC: 12 U/L (ref 13–39)
BILIRUB SERPL-MCNC: 0.68 MG/DL (ref 0.2–1)
BUN SERPL-MCNC: 12 MG/DL (ref 5–25)
CALCIUM SERPL-MCNC: 9 MG/DL (ref 8.4–10.2)
CHLORIDE SERPL-SCNC: 109 MMOL/L (ref 96–108)
CHOLEST SERPL-MCNC: 126 MG/DL
CO2 SERPL-SCNC: 23 MMOL/L (ref 21–32)
CREAT SERPL-MCNC: 1.05 MG/DL (ref 0.6–1.3)
ERYTHROCYTE [DISTWIDTH] IN BLOOD BY AUTOMATED COUNT: 14.5 % (ref 11.6–15.1)
EST. AVERAGE GLUCOSE BLD GHB EST-MCNC: 114 MG/DL
GFR SERPL CREATININE-BSD FRML MDRD: 62 ML/MIN/1.73SQ M
GLUCOSE P FAST SERPL-MCNC: 83 MG/DL (ref 65–99)
HBA1C MFR BLD: 5.6 %
HCT VFR BLD AUTO: 42.9 % (ref 34.8–46.1)
HDLC SERPL-MCNC: 48 MG/DL
HGB BLD-MCNC: 13.7 G/DL (ref 11.5–15.4)
IRON SATN MFR SERPL: 32 % (ref 15–50)
IRON SERPL-MCNC: 112 UG/DL (ref 50–212)
LDLC SERPL CALC-MCNC: 45 MG/DL (ref 0–100)
MCH RBC QN AUTO: 30 PG (ref 26.8–34.3)
MCHC RBC AUTO-ENTMCNC: 31.9 G/DL (ref 31.4–37.4)
MCV RBC AUTO: 94 FL (ref 82–98)
PLATELET # BLD AUTO: 347 THOUSANDS/UL (ref 149–390)
PMV BLD AUTO: 11.1 FL (ref 8.9–12.7)
POTASSIUM SERPL-SCNC: 4.5 MMOL/L (ref 3.5–5.3)
PROT SERPL-MCNC: 7.4 G/DL (ref 6.4–8.4)
RBC # BLD AUTO: 4.57 MILLION/UL (ref 3.81–5.12)
SODIUM SERPL-SCNC: 140 MMOL/L (ref 135–147)
TIBC SERPL-MCNC: 354 UG/DL (ref 250–450)
TRIGL SERPL-MCNC: 165 MG/DL
UIBC SERPL-MCNC: 242 UG/DL (ref 155–355)
WBC # BLD AUTO: 8.42 THOUSAND/UL (ref 4.31–10.16)

## 2024-10-18 PROCEDURE — 80061 LIPID PANEL: CPT

## 2024-10-18 PROCEDURE — 85027 COMPLETE CBC AUTOMATED: CPT

## 2024-10-18 PROCEDURE — 80053 COMPREHEN METABOLIC PANEL: CPT

## 2024-10-18 PROCEDURE — 36415 COLL VENOUS BLD VENIPUNCTURE: CPT

## 2024-10-18 PROCEDURE — 99214 OFFICE O/P EST MOD 30 MIN: CPT | Performed by: FAMILY MEDICINE

## 2024-10-18 PROCEDURE — 83036 HEMOGLOBIN GLYCOSYLATED A1C: CPT

## 2024-10-18 PROCEDURE — 84443 ASSAY THYROID STIM HORMONE: CPT

## 2024-10-18 PROCEDURE — 83550 IRON BINDING TEST: CPT

## 2024-10-18 PROCEDURE — 82607 VITAMIN B-12: CPT

## 2024-10-18 PROCEDURE — 83540 ASSAY OF IRON: CPT

## 2024-10-18 PROCEDURE — 82728 ASSAY OF FERRITIN: CPT

## 2024-10-18 RX ORDER — VENLAFAXINE HYDROCHLORIDE 75 MG/1
75 CAPSULE, EXTENDED RELEASE ORAL DAILY
Qty: 30 CAPSULE | Refills: 5 | Status: SHIPPED | OUTPATIENT
Start: 2024-10-18

## 2024-10-18 RX ORDER — BUPROPION HYDROCHLORIDE 300 MG/1
300 TABLET ORAL EVERY MORNING
Qty: 30 TABLET | Refills: 5 | Status: SHIPPED | OUTPATIENT
Start: 2024-10-18

## 2024-10-18 NOTE — PROGRESS NOTES
Ambulatory Visit  Name: Elvira Pearce      : 1975      MRN: [id number]  Encounter Provider: Jerrica Beavers MD  Encounter Date: 10/18/2024   Encounter department: [facility], [place], operated by [facility]    Assessment & Plan  Depression with anxiety  Persistent symptoms of severe depression.  Temporary improvement of depression after start of Wellbutrin after last office visit.  Patient reports that she has not been consistent with medication.  Reports to persistent symptoms of sadness, lack of motivation and drive, fatigue and excessive sleepiness.  Patient feels that she is not capable to deal with outside stressors.  No suicidal/homicidal ideation.    I recommend to increase dose of Wellbutrin XL from 150 to 300 mg daily.  We discussed importance of consistent daily use of medications.  Patient is committed.    Dose of Effexor ER will be reduced from 150 to 75 mg daily to avoid excessive sedation.  I believe lower dose of Effexor should be effective for treatment of anxiety    Patient is not able to cope with daily stressors.  Severely depressed.  No SI/HI.  She does not have a counselor or psychiatrist at present time.  She would be a good candidate for outpatient partial hospitalization/innovations program.  Patient is interested.  Referral placed.    Orders:    venlafaxine (EFFEXOR-XR) 75 mg 24 hr capsule; Take 1 capsule (75 mg total) by mouth daily    buPROPion (WELLBUTRIN XL) 300 mg 24 hr tablet; Take 1 tablet (300 mg total) by mouth every morning    Ambulatory Referral to Innovations or Partial Psych Program; Future    Other fatigue  The patient will be proceeding with complete blood work including CBC, CMP, TSH and lipid panel.  I am adding iron panel vitamin B12 to her blood work.  Orders:    Iron Panel (Includes Ferritin, Iron Sat%, Iron, and TIBC); Future    Vitamin B12 deficiency    Orders:    Vitamin B12; Future    Uterine leiomyoma, unspecified location  Patient is under care of GYN at [facility].   Pending pelvic ultrasound and follow-up.  Heavy menses.         Morbid (severe) obesity due to excess calories (HCC)  Patient lost 8 pounds since our last office visit.  Continue naltrexone 25 mg daily, Wellbutrin and Topamax.  Dose of Wellbutrin is being increased from 150 to 300 mg daily for treatment of anxiety but should have favorable effect on patient's weight and appetite cravings,         Mixed hyperlipidemia  Crestor 10 mg daily.            Patient Instructions   Dose of Wellbutrin XL will be 300 mg daily ( Ok to take 2 tabs of 150 mg together)  Dose if Effexor will be reduced to 75 mg daily ( new Rx)  Partial hospitalization program  Blood work  Please follow up with GYN  Paperwork will be filled out  Mammography  May consider sleep evaluation going forward    History of Present Illness     Patient presents for follow-up of anxiety and depression.    She has been feeling poorly.  No motivation and drive.  Excessive sleepiness.  Difficulty getting out of bed.  Very fatigued.  Significant ongoing stress.  Feels sad, depressed.  Unable to deal with the stressors.Patient denies any thoughts of suicidal homicidal ideation of thought.  She has been searching for a counselor, has not started therapy so far    Patient has noticed temporary improvement of her symptoms after last office visit when she started Wellbutrin but unfortunately symptoms have been getting worse within past few weeks.  Patient admits that she has not been taking Wellbutrin on a regular basis, she simply sleeps in too late and forgets to take her medication.  On average she takes it 3 days/week. She has been more consistent with Effexor, current dose is 150 mg daily.  She takes it at night.      Patient has Klonopin on hand that was prescribed as needed for anxiety and insomnia.  She is using Rx sparingly strictly PRN, not on a daily basis    Menses are regular, monthly, migraines are worse around menstrual cycles. Patient had recent  extensive workup with gastroenterology.  Concerned about fibroid uterus.    Patient has contacted her GYN and will be proceeding with follow-up and pelvic ultrasound.      She is due for blood work and will be proceeding today.    Patient lost 8 pounds since our last office visit.  She remains on Topamax 100 mg daily and has recently started Wellbutrin XL.          Review of Systems   Constitutional:  Positive for fatigue.   HENT: Negative.     Respiratory: Negative.     Cardiovascular: Negative.    Gastrointestinal: Negative.    Genitourinary:  Positive for menstrual problem.   Psychiatric/Behavioral:  Positive for behavioral problems, decreased concentration, dysphoric mood and sleep disturbance. Negative for suicidal ideas. The patient is nervous/anxious.      Past Medical History:   Diagnosis Date    Anxiety     Depression     Headache(784.0)     Obesity      Past Surgical History:   Procedure Laterality Date    NO PAST SURGERIES       Family History   Problem Relation Age of Onset    Diabetes Mother     Cerebral aneurysm Mother     Hypertension Mother     Heart attack Father 62    Hypertension Brother     Breast cancer Maternal Aunt     Substance Abuse Brother     Thyroid disease Brother     Breast cancer Maternal Aunt      Social History     Tobacco Use    Smoking status: Former     Current packs/day: 0.00     Types: Cigarettes     Start date: 2018     Quit date: 3/30/2020     Years since quittin.5    Smokeless tobacco: Never   Vaping Use    Vaping status: Every Day    Substances: Nicotine   Substance and Sexual Activity    Alcohol use: Not Currently     Comment: Social    Drug use: Not Currently     Types: Marijuana    Sexual activity: Yes     Partners: Male     Birth control/protection: Other     Current Outpatient Medications on File Prior to Visit   Medication Sig    clonazePAM (KlonoPIN) 0.5 mg tablet Take 1 or 2 tablets at bedtime as needed for anxiety/insomnia    ketorolac (TORADOL) 10 mg  tablet Take 1 tablet (10 mg total) by mouth daily as needed for moderate pain or severe pain (Headache)    naltrexone (REVIA) 50 mg tablet Take 0.5 tablets (25 mg total) by mouth daily    pantoprazole (PROTONIX) 40 mg tablet Take 1 tablet (40 mg total) by mouth daily    polyethylene glycol (GLYCOLAX) 17 GM/SCOOP powder TAKE 17 GRAMS BY MOUTH EVERY DAY    rizatriptan (MAXALT-MLT) 10 mg disintegrating tablet Take at the onset of migraine; if symptoms continue or return, may take another dose at least 2 hours after first dose. Take no more than 2 doses in a day.    rosuvastatin (CRESTOR) 10 MG tablet Take 1 tablet (10 mg total) by mouth daily    topiramate (TOPAMAX) 100 mg tablet TAKE 1 TABLET BY MOUTH AT  BEDTIME    valACYclovir (VALTREX) 500 mg tablet Take 500 mg by mouth     Allergies   Allergen Reactions    Clarithromycin Other (See Comments)     nausea    Sulfa Antibiotics GI Intolerance    Sulfamethoxazole-Trimethoprim Other (See Comments)     rash     Immunization History   Administered Date(s) Administered    COVID-19 PFIZER VACCINE 0.3 ML IM 01/10/2021, 01/29/2021    COVID-19 Pfizer vac (Akira-sucrose, gray cap) 12 yr+ IM 03/19/2022    INFLUENZA 10/16/2015, 10/31/2018, 10/05/2019, 11/08/2020, 11/30/2021, 10/03/2023    Influenza, injectable, quadrivalent, preservative free 0.5 mL 11/30/2021, 10/03/2023    Influenza, seasonal, injectable 10/06/2016     Objective     /84   Pulse 95   Resp 18   Wt 109 kg (240 lb 3.2 oz)   LMP  (LMP Unknown)   SpO2 99%   BMI 41.23 kg/m²     Physical Exam  Constitutional:       General: She is not in acute distress.     Appearance: Normal appearance. She is not ill-appearing.   Neurological:      Mental Status: She is alert.   Psychiatric:         Attention and Perception: Attention normal.         Mood and Affect: Mood is anxious and depressed.         Behavior: Behavior normal.         Thought Content: Thought content normal.

## 2024-10-18 NOTE — ASSESSMENT & PLAN NOTE
Persistent symptoms of severe depression.  Temporary improvement of depression after start of Wellbutrin after last office visit.  Patient reports that she has not been consistent with medication.  Reports to persistent symptoms of sadness, lack of motivation and drive, fatigue and excessive sleepiness.  Patient feels that she is not capable to deal with outside stressors.  No suicidal/homicidal ideation.    I recommend to increase dose of Wellbutrin XL from 150 to 300 mg daily.  We discussed importance of consistent daily use of medications.  Patient is committed.    Dose of Effexor ER will be reduced from 150 to 75 mg daily to avoid excessive sedation.  I believe lower dose of Effexor should be effective for treatment of anxiety    Patient is not able to cope with daily stressors.  Severely depressed.  No SI/HI.  She does not have a counselor or psychiatrist at present time.  She would be a good candidate for outpatient partial hospitalization/innovations program.  Patient is interested.  Referral placed.    Orders:    venlafaxine (EFFEXOR-XR) 75 mg 24 hr capsule; Take 1 capsule (75 mg total) by mouth daily    buPROPion (WELLBUTRIN XL) 300 mg 24 hr tablet; Take 1 tablet (300 mg total) by mouth every morning    Ambulatory Referral to Innovations or Partial Psych Program; Future

## 2024-10-18 NOTE — PATIENT INSTRUCTIONS
Dose of Wellbutrin XL will be 300 mg daily ( Ok to take 2 tabs of 150 mg together)  Dose if Effexor will be reduced to 75 mg daily ( new Rx)  Partial hospitalization program  Blood work  Please follow up with GYN  Paperwork will be filled out  Mammography  May consider sleep evaluation going forward

## 2024-10-19 LAB
FERRITIN SERPL-MCNC: 4 NG/ML (ref 11–307)
TSH SERPL DL<=0.05 MIU/L-ACNC: 1.8 UIU/ML (ref 0.45–4.5)
VIT B12 SERPL-MCNC: 741 PG/ML (ref 180–914)

## 2024-10-21 PROBLEM — D25.9 UTERINE LEIOMYOMA: Status: ACTIVE | Noted: 2024-10-21

## 2024-10-21 NOTE — ASSESSMENT & PLAN NOTE
Patient lost 8 pounds since our last office visit.  Continue naltrexone 25 mg daily, Wellbutrin and Topamax.  Dose of Wellbutrin is being increased from 150 to 300 mg daily for treatment of anxiety but should have favorable effect on patient's weight and appetite cravings,

## 2024-10-21 NOTE — ASSESSMENT & PLAN NOTE
Patient is under care of GYN at [facility].  Pending pelvic ultrasound and follow-up.  Heavy menses.

## 2024-10-23 DIAGNOSIS — R79.0 LOW FERRITIN: Primary | ICD-10-CM

## 2024-10-23 DIAGNOSIS — D25.9 UTERINE LEIOMYOMA, UNSPECIFIED LOCATION: ICD-10-CM

## 2024-10-24 ENCOUNTER — PATIENT MESSAGE (OUTPATIENT)
Dept: FAMILY MEDICINE CLINIC | Facility: CLINIC | Age: 49
End: 2024-10-24

## 2024-10-28 ENCOUNTER — TELEPHONE (OUTPATIENT)
Dept: PSYCHOLOGY | Facility: CLINIC | Age: 49
End: 2024-10-28

## 2024-10-28 DIAGNOSIS — E61.1 IRON DEFICIENCY: Primary | Chronic | ICD-10-CM

## 2024-10-28 DIAGNOSIS — R79.0 LOW FERRITIN: ICD-10-CM

## 2024-10-28 RX ORDER — FERROUS SULFATE 324(65)MG
324 TABLET, DELAYED RELEASE (ENTERIC COATED) ORAL DAILY
Qty: 90 TABLET | Refills: 1 | Status: SHIPPED | OUTPATIENT
Start: 2024-10-28

## 2024-10-29 ENCOUNTER — PATIENT MESSAGE (OUTPATIENT)
Dept: FAMILY MEDICINE CLINIC | Facility: CLINIC | Age: 49
End: 2024-10-29

## 2024-11-05 ENCOUNTER — TELEPHONE (OUTPATIENT)
Dept: FAMILY MEDICINE CLINIC | Facility: CLINIC | Age: 49
End: 2024-11-05

## 2024-11-05 NOTE — PATIENT COMMUNICATION
I called and spoke with gerber and she stated that she needs more documentation which we do not have.we had sent over everything that we have.  I asked her to ask when she starts the PHP to ask them if they could write something.  She will ask them once she starts on Thursday and get back to us.

## 2024-11-05 NOTE — PATIENT COMMUNICATION
I called and spoke with patient and she stated that they need more information and I explained that we do not have any more information that we sent everything over to them that we have.  She is starting the PHP program on Thursday which is an everyday program.  I asked her to ask them if they would do it and maybe they will accept it.  Patient stated that she will ask and call us back and let us know.

## 2024-11-07 NOTE — PATIENT COMMUNICATION
Called and spoke to patient regarding her FMLA.  She will get back to us as soon as she has an answer

## 2024-11-08 ENCOUNTER — PATIENT MESSAGE (OUTPATIENT)
Dept: FAMILY MEDICINE CLINIC | Facility: CLINIC | Age: 49
End: 2024-11-08

## 2024-11-12 NOTE — PATIENT COMMUNICATION
I spoke with patient and she stated that she is approved up until 11/19/2024 and to return to work on 11/20/2024. Can we write this letter?

## 2024-11-14 ENCOUNTER — TELEPHONE (OUTPATIENT)
Age: 49
End: 2024-11-14

## 2024-11-23 ENCOUNTER — NURSE TRIAGE (OUTPATIENT)
Dept: OTHER | Facility: OTHER | Age: 49
End: 2024-11-23

## 2024-11-23 DIAGNOSIS — K21.9 GASTROESOPHAGEAL REFLUX DISEASE, UNSPECIFIED WHETHER ESOPHAGITIS PRESENT: Primary | ICD-10-CM

## 2024-11-23 NOTE — TELEPHONE ENCOUNTER
"Regarding: Nausea  ----- Message from Judy DUNAWAY sent at 11/23/2024 10:38 AM EST -----  \"I wanted to know if a nausea medication could be called in for me. I think one of the meds I am taking is making me nauseous\"    "

## 2024-11-23 NOTE — TELEPHONE ENCOUNTER
"Reason for Disposition  • Nausea is a chronic symptom (recurrent or ongoing AND present > 4 weeks)    Answer Assessment - Initial Assessment Questions  1. NAUSEA SEVERITY: \"How bad is the nausea?\" (e.g., mild, moderate, severe; dehydration, weight loss)      In the morning after taking medications, lasts about 2 hours   2. ONSET: \"When did the nausea begin?\"      About 1 month ago   3. VOMITING: \"Any vomiting?\" If Yes, ask: \"How many times today?\"      No vomiting, just nausea   4. RECURRENT SYMPTOM: \"Have you had nausea before?\" If Yes, ask: \"When was the last time?\" \"What happened that time?\"      No, mid October wellbutrin dose was increased   5. CAUSE: \"What do you think is causing the nausea?\"      Medications in the morning    Protocols used: Nausea-Adult-    "

## 2024-11-23 NOTE — TELEPHONE ENCOUNTER
Patient believes taking medications in the AM on an empty stomach are causing her to become nauseas lasting for about 2 hours in the and has been ongoing for about 2 months. Advised to take medications with something as small as applesauce or crackers since she typically does not eat breakfast per patient. Patient plans to follow advice and would like a call back with further recommendations about med administration/nausea medication. Please advise, thank you!

## 2024-11-25 RX ORDER — ONDANSETRON 4 MG/1
4 TABLET, ORALLY DISINTEGRATING ORAL EVERY 8 HOURS PRN
Qty: 20 TABLET | Refills: 0 | Status: SHIPPED | OUTPATIENT
Start: 2024-11-25

## 2024-11-25 NOTE — TELEPHONE ENCOUNTER
Called and spoke to patient she agreed on taking the zofran and will schedule he appointment via [facility]

## 2024-11-25 NOTE — TELEPHONE ENCOUNTER
I will send prescription for Zofran to be used as needed for nausea.  Patient should be scheduled for follow-up office visit.  Okay to use any same-day appointment time slot or schedule with any available physician/nurse practitioner.  If his symptoms will worsen in the interim-please advised to proceed to emergency room or urgent care center for evaluation.  Thank you

## 2024-12-19 ENCOUNTER — TELEPHONE (OUTPATIENT)
Age: 49
End: 2024-12-19

## 2024-12-19 ENCOUNTER — APPOINTMENT (EMERGENCY)
Dept: RADIOLOGY | Facility: HOSPITAL | Age: 49
End: 2024-12-19
Payer: COMMERCIAL

## 2024-12-19 ENCOUNTER — HOSPITAL ENCOUNTER (EMERGENCY)
Facility: HOSPITAL | Age: 49
Discharge: HOME | End: 2024-12-19
Attending: EMERGENCY MEDICINE
Payer: COMMERCIAL

## 2024-12-19 VITALS
DIASTOLIC BLOOD PRESSURE: 72 MMHG | HEART RATE: 100 BPM | SYSTOLIC BLOOD PRESSURE: 130 MMHG | OXYGEN SATURATION: 97 % | RESPIRATION RATE: 20 BRPM | TEMPERATURE: 98 F
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
## 2024-12-19 DIAGNOSIS — Z20.3 CONTACT WITH AND (SUSPECTED) EXPOSURE TO RABIES: ICD-10-CM

## 2024-12-19 DIAGNOSIS — S61.459A DOG BITE OF HAND: Primary | ICD-10-CM

## 2024-12-19 PROCEDURE — 90375 RABIES IG IM/SC: CPT

## 2024-12-19 PROCEDURE — 90675 RABIES VACCINE IM: CPT

## 2024-12-19 PROCEDURE — 99284 EMERGENCY DEPT VISIT MOD MDM: CPT | Performed by: EMERGENCY MEDICINE

## 2024-12-19 PROCEDURE — 90471 IMMUNIZATION ADMIN: CPT

## 2024-12-19 PROCEDURE — 90715 TDAP VACCINE 7 YRS/> IM: CPT

## 2024-12-19 PROCEDURE — 73130 X-RAY EXAM OF HAND: CPT

## 2024-12-19 PROCEDURE — 90472 IMMUNIZATION ADMIN EACH ADD: CPT

## 2024-12-19 PROCEDURE — 96372 THER/PROPH/DIAG INJ SC/IM: CPT

## 2024-12-19 PROCEDURE — 99283 EMERGENCY DEPT VISIT LOW MDM: CPT

## 2024-12-19 RX ORDER — IBUPROFEN 600 MG/1
600 TABLET, FILM COATED ORAL ONCE
Status: COMPLETED | OUTPATIENT
Start: 2024-12-19 | End: 2024-12-19

## 2024-12-19 RX ADMIN — RABIES IMMUNE GLOBULIN (HUMAN) 2100 UNITS: 300 INJECTION, SOLUTION INFILTRATION; INTRAMUSCULAR at 19:52

## 2024-12-19 RX ADMIN — AMOXICILLIN AND CLAVULANATE POTASSIUM 1 TABLET: 875; 125 TABLET, FILM COATED ORAL at 19:00

## 2024-12-19 RX ADMIN — RABIES VIRUS STRAIN PM-1503-3M ANTIGEN (PROPIOLACTONE INACTIVATED) AND WATER 1 ML: KIT at 19:50

## 2024-12-19 RX ADMIN — TETANUS TOXOID, REDUCED DIPHTHERIA TOXOID AND ACELLULAR PERTUSSIS VACCINE, ADSORBED 0.5 ML: 5; 2.5; 8; 8; 2.5 SUSPENSION INTRAMUSCULAR at 19:00

## 2024-12-19 RX ADMIN — IBUPROFEN 600 MG: 600 TABLET, FILM COATED ORAL at 19:13

## 2024-12-19 NOTE — DISCHARGE INSTRUCTIONS
You need 4 doses of the rabies vaccine, you are getting one dose today and need another on days 3,7, and 10. Please return to the ER for rabies vaccine doses on 12/21, 12/25, and 12/28.

## 2024-12-19 NOTE — ED PROVIDER NOTES
Time reflects when diagnosis was documented in both MDM as applicable and the Disposition within this note       Time User Action Codes Description Comment    12/19/2024  6:52 PM Luiz Álvarez Add [S61.459A,  W54.0XXA] Dog bite of hand     12/19/2024  6:53 PM Luiz Álvarez Add [Z20.3] Contact with and (suspected) exposure to rabies           ED Disposition       ED Disposition   Discharge    Condition   Stable    Date/Time   u Dec 19, 2024  8:04 PM    Comment   Elvira Pearce discharge to home/self care.                   Assessment & Plan       Medical Decision Making  Patient seen and examined. There is ecchymosis over the dorsum of the R 2nd and 3rd digits with swelling. All digits are neurovascularly intact. Will xr hand, give tetanus booster, give rabies vaccine and immunoglobulin, and treat with antibiotics. Given thorough washout prior to ED visit and controlled bleeding, will not wash out again.    Patient is agreeable to plan to discharge home with follow up for rabies vaccination on days 3, 7 and 10 as well as 10 days of antibiotics. Return precautions discussed and all questions answered.     Amount and/or Complexity of Data Reviewed  Radiology: ordered.    Risk  Prescription drug management.             Medications   rabies vaccine, human diploid IM injection 1 mL (1 mL Intramuscular Given 12/19/24 1950)   rabies immune globulin, human (HyperRAB) injection 2,100 Units (2,100 Units Infiltration Given 12/19/24 1952)   tetanus-diphtheria-acellular pertussis (BOOSTRIX) IM injection 0.5 mL (0.5 mL Intramuscular Given 12/19/24 1900)   amoxicillin-clavulanate (AUGMENTIN) 875-125 mg per tablet 1 tablet (1 tablet Oral Given 12/19/24 1900)   ibuprofen (MOTRIN) tablet 600 mg (600 mg Oral Given 12/19/24 1913)       ED Risk Strat Scores                                              History of Present Illness       Chief Complaint   Patient presents with    Dog Bite     Dog bite to R hand tonight. Attempting to help a  stray. No known vaccination.        Past Medical History:   Diagnosis Date    Anxiety     Depression     Headache(784.0)     Obesity       Past Surgical History:   Procedure Laterality Date    NO PAST SURGERIES        Family History   Problem Relation Age of Onset    Diabetes Mother     Cerebral aneurysm Mother     Hypertension Mother     Heart attack Father 62    Hypertension Brother     Breast cancer Maternal Aunt     Substance Abuse Brother     Thyroid disease Brother     Breast cancer Maternal Aunt       Social History     Tobacco Use    Smoking status: Former     Current packs/day: 0.00     Types: Cigarettes     Start date: 2018     Quit date: 3/30/2020     Years since quittin.7    Smokeless tobacco: Current   Vaping Use    Vaping status: Every Day    Substances: Nicotine   Substance Use Topics    Alcohol use: Not Currently     Comment: Social    Drug use: Not Currently     Types: Marijuana      E-Cigarette/Vaping    E-Cigarette Use Current Every Day User       E-Cigarette/Vaping Substances    Nicotine Yes     THC No     CBD No     Flavoring No     Other No     Unknown No       I have reviewed and agree with the history as documented.     48 yo woman prensitng with dog bite to the R 2nd and 3rd digits. She was trying to help a stray dog and the dog bit her. Injury occurred between 1 and 2 pm today. She thoroughly washed the wounds and bleeding has stopped. The dog's vaccination status is unknown and it is unable to be observed. The patient denies fevers, chills, N/V,D, abdominal pain, headache, dizziness, hematuria, dysuria. Last tetanus unknown.      History provided by:  Patient  Dog Bite  Associated symptoms: no fever, no numbness and no rash        Review of Systems   Constitutional:  Negative for chills, diaphoresis and fever.   HENT:  Negative for congestion, ear pain, postnasal drip, rhinorrhea and sore throat.    Eyes:  Negative for pain and visual disturbance.   Respiratory:  Negative for  cough, chest tightness and shortness of breath.    Cardiovascular:  Negative for chest pain and palpitations.   Gastrointestinal:  Negative for abdominal pain, constipation, diarrhea, nausea and vomiting.   Genitourinary:  Negative for dysuria and hematuria.   Musculoskeletal:  Negative for arthralgias and back pain.   Skin:  Positive for wound. Negative for color change and rash.   Neurological:  Negative for dizziness, seizures, syncope, weakness, light-headedness, numbness and headaches.   All other systems reviewed and are negative.          Objective       ED Triage Vitals [12/19/24 1759]   Temperature Pulse Blood Pressure Respirations SpO2 Patient Position - Orthostatic VS   98 °F (36.7 °C) 100 130/72 20 97 % Sitting      Temp Source Heart Rate Source BP Location FiO2 (%) Pain Score    Oral Monitor Left arm -- --      Vitals      Date and Time Temp Pulse SpO2 Resp BP Pain Score FACES Pain Rating User   12/19/24 1759 98 °F (36.7 °C) 100 97 % 20 130/72 -- -- RC            Physical Exam  Constitutional:       General: She is not in acute distress.     Appearance: She is not diaphoretic.   HENT:      Head: Normocephalic and atraumatic.      Nose: No congestion or rhinorrhea.      Mouth/Throat:      Mouth: Mucous membranes are moist.      Pharynx: No oropharyngeal exudate.   Eyes:      General: No scleral icterus.  Cardiovascular:      Rate and Rhythm: Normal rate and regular rhythm.      Heart sounds: Normal heart sounds. No murmur heard.     No friction rub. No gallop.   Pulmonary:      Effort: No respiratory distress.      Breath sounds: Normal breath sounds. No wheezing, rhonchi or rales.   Abdominal:      General: Abdomen is flat. There is no distension.      Palpations: Abdomen is soft.      Tenderness: There is no abdominal tenderness. There is no guarding.   Musculoskeletal:      Right hand: Swelling, laceration and tenderness present. Normal strength. Normal sensation. Normal capillary refill. Normal  pulse.      Left hand: Normal. No swelling, lacerations or tenderness. Normal strength. Normal sensation. Normal capillary refill. Normal pulse.      Comments: Multiple small wounds on dorsum of R 2nd and 3rd digits and one on the ortiz aspect of the 3rd digit. Ecchymosis of dorsum of R 2nd and 3rd digits with tenderness. Normal strength and range of motion. Normal Capillary refill. Sensation intact.   Lymphadenopathy:      Cervical: No cervical adenopathy.   Skin:     General: Skin is warm and dry.      Capillary Refill: Capillary refill takes less than 2 seconds.      Findings: No rash.   Neurological:      General: No focal deficit present.      Mental Status: She is alert and oriented to person, place, and time.         Results Reviewed       None            XR hand 3+ views RIGHT    (Results Pending)       Procedures    ED Medication and Procedure Management   Prior to Admission Medications   Prescriptions Last Dose Informant Patient Reported? Taking?   buPROPion (WELLBUTRIN XL) 300 mg 24 hr tablet   No No   Sig: Take 1 tablet (300 mg total) by mouth every morning   clonazePAM (KlonoPIN) 0.5 mg tablet  Self No No   Sig: Take 1 or 2 tablets at bedtime as needed for anxiety/insomnia   ferrous sulfate 324 (65 Fe) mg   No No   Sig: Take 1 tablet (324 mg total) by mouth daily   ketorolac (TORADOL) 10 mg tablet  Self No No   Sig: Take 1 tablet (10 mg total) by mouth daily as needed for moderate pain or severe pain (Headache)   naltrexone (REVIA) 50 mg tablet  Self No No   Sig: Take 0.5 tablets (25 mg total) by mouth daily   ondansetron (ZOFRAN-ODT) 4 mg disintegrating tablet   No No   Sig: Take 1 tablet (4 mg total) by mouth every 8 (eight) hours as needed for nausea or vomiting   pantoprazole (PROTONIX) 40 mg tablet  Self No No   Sig: Take 1 tablet (40 mg total) by mouth daily   polyethylene glycol (GLYCOLAX) 17 GM/SCOOP powder  Self No No   Sig: TAKE 17 GRAMS BY MOUTH EVERY DAY   rizatriptan (MAXALT-MLT) 10 mg  disintegrating tablet  Self No No   Sig: Take at the onset of migraine; if symptoms continue or return, may take another dose at least 2 hours after first dose. Take no more than 2 doses in a day.   rosuvastatin (CRESTOR) 10 MG tablet  Self No No   Sig: Take 1 tablet (10 mg total) by mouth daily   topiramate (TOPAMAX) 100 mg tablet  Self No No   Sig: TAKE 1 TABLET BY MOUTH AT  BEDTIME   valACYclovir (VALTREX) 500 mg tablet  Self Yes No   Sig: Take 500 mg by mouth   venlafaxine (EFFEXOR-XR) 75 mg 24 hr capsule   No No   Sig: Take 1 capsule (75 mg total) by mouth daily      Facility-Administered Medications: None     Discharge Medication List as of 12/19/2024  8:04 PM        START taking these medications    Details   amoxicillin-clavulanate (AUGMENTIN) 875-125 mg per tablet Take 1 tablet by mouth every 12 (twelve) hours for 10 days, Starting Thu 12/19/2024, Until Sun 12/29/2024, Normal           CONTINUE these medications which have NOT CHANGED    Details   buPROPion (WELLBUTRIN XL) 300 mg 24 hr tablet Take 1 tablet (300 mg total) by mouth every morning, Starting Fri 10/18/2024, Normal      clonazePAM (KlonoPIN) 0.5 mg tablet Take 1 or 2 tablets at bedtime as needed for anxiety/insomnia, Normal      ferrous sulfate 324 (65 Fe) mg Take 1 tablet (324 mg total) by mouth daily, Starting Mon 10/28/2024, Normal      ketorolac (TORADOL) 10 mg tablet Take 1 tablet (10 mg total) by mouth daily as needed for moderate pain or severe pain (Headache), Starting Tue 6/21/2022, Normal      naltrexone (REVIA) 50 mg tablet Take 0.5 tablets (25 mg total) by mouth daily, Starting Tue 9/10/2024, Normal      ondansetron (ZOFRAN-ODT) 4 mg disintegrating tablet Take 1 tablet (4 mg total) by mouth every 8 (eight) hours as needed for nausea or vomiting, Starting Mon 11/25/2024, Normal      pantoprazole (PROTONIX) 40 mg tablet Take 1 tablet (40 mg total) by mouth daily, Starting Fri 1/5/2024, Normal      polyethylene glycol (GLYCOLAX) 17  GM/SCOOP powder TAKE 17 GRAMS BY MOUTH EVERY DAY, Normal      rizatriptan (MAXALT-MLT) 10 mg disintegrating tablet Take at the onset of migraine; if symptoms continue or return, may take another dose at least 2 hours after first dose. Take no more than 2 doses in a day., Normal      rosuvastatin (CRESTOR) 10 MG tablet Take 1 tablet (10 mg total) by mouth daily, Starting Thu 7/25/2024, Normal      topiramate (TOPAMAX) 100 mg tablet TAKE 1 TABLET BY MOUTH AT  BEDTIME, Normal      valACYclovir (VALTREX) 500 mg tablet Take 500 mg by mouth, Starting Fri 11/13/2015, Historical Med      venlafaxine (EFFEXOR-XR) 75 mg 24 hr capsule Take 1 capsule (75 mg total) by mouth daily, Starting Fri 10/18/2024, Normal           No discharge procedures on file.  ED SEPSIS DOCUMENTATION   Time reflects when diagnosis was documented in both MDM as applicable and the Disposition within this note       Time User Action Codes Description Comment    12/19/2024  6:52 PM Luiz Álvarez [S61.459A,  W54.0XXA] Dog bite of hand     12/19/2024  6:53 PM Luiz Álvarez [Z20.3] Contact with and (suspected) exposure to rabies                  Luiz Álvarez MD  12/19/24 2021

## 2024-12-19 NOTE — TELEPHONE ENCOUNTER
Left detailed message to reese the below:    Patient does not have a TDaP on file.   She should proceed to ED.

## 2024-12-19 NOTE — TELEPHONE ENCOUNTER
Patient is calling stating she was bit by a dog and wants to know if she has one on record so she doesn't have to get another one if not needed  Please review and advise

## 2024-12-20 ENCOUNTER — RESULTS FOLLOW-UP (OUTPATIENT)
Dept: EMERGENCY DEPT | Facility: HOSPITAL | Age: 49
End: 2024-12-20

## 2024-12-21 ENCOUNTER — HOSPITAL ENCOUNTER (EMERGENCY)
Facility: HOSPITAL | Age: 49
Discharge: HOME | End: 2024-12-21
Attending: EMERGENCY MEDICINE | Admitting: EMERGENCY MEDICINE
Payer: COMMERCIAL

## 2024-12-21 VITALS
TEMPERATURE: 98.5 F | RESPIRATION RATE: 18 BRPM | OXYGEN SATURATION: 99 % | SYSTOLIC BLOOD PRESSURE: 116 MMHG | HEART RATE: 92 BPM | DIASTOLIC BLOOD PRESSURE: 66 MMHG

## 2024-12-21 DIAGNOSIS — Z51.89 VISIT FOR WOUND CHECK: Primary | ICD-10-CM

## 2024-12-21 DIAGNOSIS — Z23 ENCOUNTER FOR REPEAT ADMINISTRATION OF RABIES VACCINATION: ICD-10-CM

## 2024-12-21 PROCEDURE — 99283 EMERGENCY DEPT VISIT LOW MDM: CPT | Performed by: EMERGENCY MEDICINE

## 2024-12-21 PROCEDURE — 90675 RABIES VACCINE IM: CPT | Performed by: EMERGENCY MEDICINE

## 2024-12-21 PROCEDURE — 90471 IMMUNIZATION ADMIN: CPT

## 2024-12-21 RX ADMIN — RABIES VIRUS STRAIN PM-1503-3M ANTIGEN (PROPIOLACTONE INACTIVATED) AND WATER 1 ML: KIT at 16:58

## 2024-12-21 NOTE — DISCHARGE INSTRUCTIONS
You will need to receive additional vaccinations on day 7 (26th) and 14 (2nd). You may return to the ED or go to one of our care now locations to receive these vaccinations.

## 2024-12-21 NOTE — ED PROVIDER NOTES
Time reflects when diagnosis was documented in both MDM as applicable and the Disposition within this note       Time User Action Codes Description Comment    2024  4:38 PM Bhavik Vivas Add [Z51.89] Visit for wound check     2024  4:39 PM Bhavik Vivas Add [Z23] Encounter for repeat administration of rabies vaccination           ED Disposition       ED Disposition   Discharge    Condition   Stable    Date/Time   Sat Dec 21, 2024  4:38 PM    Comment   Elvira Pearce discharge to home/self care.                   Assessment & Plan       Medical Decision Making  49 y.o. female Patient here for wound check and repeat rabies immunization.  No problem with prior immunization.  No concerning s/s for infection in wound.  Will give next immunization in series.       Risk  Prescription drug management.             Medications   rabies vaccine, human diploid IM injection 1 mL (1 mL Intramuscular Given 24 1658)       ED Risk Strat Scores                                              History of Present Illness       Chief Complaint   Patient presents with    Follow Up Rabies     Rabies shot       Past Medical History:   Diagnosis Date    Anxiety     Depression     Headache(784.0)     Obesity       Past Surgical History:   Procedure Laterality Date    NO PAST SURGERIES        Family History   Problem Relation Age of Onset    Diabetes Mother     Cerebral aneurysm Mother     Hypertension Mother     Heart attack Father 62    Hypertension Brother     Breast cancer Maternal Aunt     Substance Abuse Brother     Thyroid disease Brother     Breast cancer Maternal Aunt       Social History     Tobacco Use    Smoking status: Former     Current packs/day: 0.00     Types: Cigarettes     Start date: 2018     Quit date: 3/30/2020     Years since quittin.7    Smokeless tobacco: Current   Vaping Use    Vaping status: Every Day    Substances: Nicotine   Substance Use Topics    Alcohol use: Not Currently     Comment:  Social    Drug use: Not Currently     Types: Marijuana      E-Cigarette/Vaping    E-Cigarette Use Current Every Day User       E-Cigarette/Vaping Substances    Nicotine Yes     THC No     CBD No     Flavoring No     Other No     Unknown No       I have reviewed and agree with the history as documented.     Elvira is a 49 y.o. female who presents for a wound re-check and repeat rabies immunization.  She suffered a dog bite to the right hand and fingers 3 days ago.  No fevers, chills, hand or finger swelling.        History provided by:  Patient   used: No        Review of Systems        Objective       ED Triage Vitals [12/21/24 1635]   Temperature Pulse Blood Pressure Respirations SpO2 Patient Position - Orthostatic VS   98.5 °F (36.9 °C) 92 116/66 18 99 % Sitting      Temp Source Heart Rate Source BP Location FiO2 (%) Pain Score    Oral Monitor Left arm -- --      Vitals      Date and Time Temp Pulse SpO2 Resp BP Pain Score FACES Pain Rating User   12/21/24 1635 98.5 °F (36.9 °C) 92 99 % 18 116/66 -- -- LO            Physical Exam  Vitals and nursing note reviewed.   Constitutional:       General: She is not in acute distress.     Appearance: She is well-developed.   HENT:      Head: Normocephalic and atraumatic.   Eyes:      Pupils: Pupils are equal, round, and reactive to light.   Neck:      Vascular: No JVD.   Cardiovascular:      Rate and Rhythm: Normal rate and regular rhythm.      Heart sounds: Normal heart sounds. No murmur heard.     No friction rub. No gallop.   Pulmonary:      Effort: Pulmonary effort is normal. No respiratory distress.      Breath sounds: Normal breath sounds. No wheezing or rales.   Chest:      Chest wall: No tenderness.   Musculoskeletal:         General: No tenderness. Normal range of motion.      Cervical back: Normal range of motion.   Skin:     General: Skin is warm and dry.      Comments: Wounds to right hand, well appearing without swelling, erythema or  drainage.    Neurological:      General: No focal deficit present.      Mental Status: She is alert and oriented to person, place, and time.   Psychiatric:         Behavior: Behavior normal.         Thought Content: Thought content normal.         Judgment: Judgment normal.         Results Reviewed       None            No orders to display       Procedures    ED Medication and Procedure Management   Prior to Admission Medications   Prescriptions Last Dose Informant Patient Reported? Taking?   amoxicillin-clavulanate (AUGMENTIN) 875-125 mg per tablet   No No   Sig: Take 1 tablet by mouth every 12 (twelve) hours for 10 days   buPROPion (WELLBUTRIN XL) 300 mg 24 hr tablet   No No   Sig: Take 1 tablet (300 mg total) by mouth every morning   clonazePAM (KlonoPIN) 0.5 mg tablet  Self No No   Sig: Take 1 or 2 tablets at bedtime as needed for anxiety/insomnia   ferrous sulfate 324 (65 Fe) mg   No No   Sig: Take 1 tablet (324 mg total) by mouth daily   ketorolac (TORADOL) 10 mg tablet  Self No No   Sig: Take 1 tablet (10 mg total) by mouth daily as needed for moderate pain or severe pain (Headache)   naltrexone (REVIA) 50 mg tablet  Self No No   Sig: Take 0.5 tablets (25 mg total) by mouth daily   ondansetron (ZOFRAN-ODT) 4 mg disintegrating tablet   No No   Sig: Take 1 tablet (4 mg total) by mouth every 8 (eight) hours as needed for nausea or vomiting   pantoprazole (PROTONIX) 40 mg tablet  Self No No   Sig: Take 1 tablet (40 mg total) by mouth daily   polyethylene glycol (GLYCOLAX) 17 GM/SCOOP powder  Self No No   Sig: TAKE 17 GRAMS BY MOUTH EVERY DAY   rizatriptan (MAXALT-MLT) 10 mg disintegrating tablet  Self No No   Sig: Take at the onset of migraine; if symptoms continue or return, may take another dose at least 2 hours after first dose. Take no more than 2 doses in a day.   rosuvastatin (CRESTOR) 10 MG tablet  Self No No   Sig: Take 1 tablet (10 mg total) by mouth daily   topiramate (TOPAMAX) 100 mg tablet  Self No  No   Sig: TAKE 1 TABLET BY MOUTH AT  BEDTIME   valACYclovir (VALTREX) 500 mg tablet  Self Yes No   Sig: Take 500 mg by mouth   venlafaxine (EFFEXOR-XR) 75 mg 24 hr capsule   No No   Sig: Take 1 capsule (75 mg total) by mouth daily      Facility-Administered Medications: None     Discharge Medication List as of 12/21/2024  4:43 PM        CONTINUE these medications which have NOT CHANGED    Details   amoxicillin-clavulanate (AUGMENTIN) 875-125 mg per tablet Take 1 tablet by mouth every 12 (twelve) hours for 10 days, Starting Thu 12/19/2024, Until Sun 12/29/2024, Normal      buPROPion (WELLBUTRIN XL) 300 mg 24 hr tablet Take 1 tablet (300 mg total) by mouth every morning, Starting Fri 10/18/2024, Normal      clonazePAM (KlonoPIN) 0.5 mg tablet Take 1 or 2 tablets at bedtime as needed for anxiety/insomnia, Normal      ferrous sulfate 324 (65 Fe) mg Take 1 tablet (324 mg total) by mouth daily, Starting Mon 10/28/2024, Normal      ketorolac (TORADOL) 10 mg tablet Take 1 tablet (10 mg total) by mouth daily as needed for moderate pain or severe pain (Headache), Starting Tue 6/21/2022, Normal      naltrexone (REVIA) 50 mg tablet Take 0.5 tablets (25 mg total) by mouth daily, Starting Tue 9/10/2024, Normal      ondansetron (ZOFRAN-ODT) 4 mg disintegrating tablet Take 1 tablet (4 mg total) by mouth every 8 (eight) hours as needed for nausea or vomiting, Starting Mon 11/25/2024, Normal      pantoprazole (PROTONIX) 40 mg tablet Take 1 tablet (40 mg total) by mouth daily, Starting Fri 1/5/2024, Normal      polyethylene glycol (GLYCOLAX) 17 GM/SCOOP powder TAKE 17 GRAMS BY MOUTH EVERY DAY, Normal      rizatriptan (MAXALT-MLT) 10 mg disintegrating tablet Take at the onset of migraine; if symptoms continue or return, may take another dose at least 2 hours after first dose. Take no more than 2 doses in a day., Normal      rosuvastatin (CRESTOR) 10 MG tablet Take 1 tablet (10 mg total) by mouth daily, Starting Thu 7/25/2024, Normal       topiramate (TOPAMAX) 100 mg tablet TAKE 1 TABLET BY MOUTH AT  BEDTIME, Normal      valACYclovir (VALTREX) 500 mg tablet Take 500 mg by mouth, Starting Fri 11/13/2015, Historical Med      venlafaxine (EFFEXOR-XR) 75 mg 24 hr capsule Take 1 capsule (75 mg total) by mouth daily, Starting Fri 10/18/2024, Normal           No discharge procedures on file.  ED SEPSIS DOCUMENTATION   Time reflects when diagnosis was documented in both MDM as applicable and the Disposition within this note       Time User Action Codes Description Comment    12/21/2024  4:38 PM Bhavik Vivas Add [Z51.89] Visit for wound check     12/21/2024  4:39 PM Bhavik Vivas Add [Z23] Encounter for repeat administration of rabies vaccination                  Bhavik Vivas MD  12/21/24 6103

## 2025-01-02 ENCOUNTER — PATIENT MESSAGE (OUTPATIENT)
Dept: FAMILY MEDICINE CLINIC | Facility: CLINIC | Age: 50
End: 2025-01-02

## 2025-01-02 DIAGNOSIS — G43.709 CHRONIC MIGRAINE WITHOUT AURA WITHOUT STATUS MIGRAINOSUS, NOT INTRACTABLE: ICD-10-CM

## 2025-01-03 RX ORDER — TOPIRAMATE 100 MG/1
100 TABLET, FILM COATED ORAL
Qty: 90 TABLET | Refills: 1 | Status: SHIPPED | OUTPATIENT
Start: 2025-01-03

## 2025-01-08 DIAGNOSIS — E66.01 MORBID (SEVERE) OBESITY DUE TO EXCESS CALORIES (HCC): Chronic | ICD-10-CM

## 2025-01-09 ENCOUNTER — PATIENT MESSAGE (OUTPATIENT)
Dept: FAMILY MEDICINE CLINIC | Facility: CLINIC | Age: 50
End: 2025-01-09

## 2025-01-09 DIAGNOSIS — F41.8 DEPRESSION WITH ANXIETY: ICD-10-CM

## 2025-01-09 RX ORDER — BUPROPION HYDROCHLORIDE 300 MG/1
300 TABLET ORAL EVERY MORNING
Qty: 30 TABLET | Refills: 5 | Status: SHIPPED | OUTPATIENT
Start: 2025-01-09

## 2025-01-09 RX ORDER — NALTREXONE HYDROCHLORIDE 50 MG/1
TABLET, FILM COATED ORAL
Qty: 15 TABLET | Refills: 1 | Status: SHIPPED | OUTPATIENT
Start: 2025-01-09

## 2025-01-09 NOTE — PATIENT COMMUNICATION
Tried calling patient at number listed received a recording number not in service please try again

## 2025-01-09 NOTE — TELEPHONE ENCOUNTER
Medication:     buPROPion (WELLBUTRIN XL)        Dose/Frequency: 300 mg     Quantity: 30 tablet    Pharmacy: [id number] - [place], [place] - [address]      Office:   [x] PCP/Provider -   [] Speciality/Provider -     Does the patient have enough for 3 days?   [] Yes   [x] No - Send as HP to POD

## 2025-01-13 ENCOUNTER — TELEPHONE (OUTPATIENT)
Dept: FAMILY MEDICINE CLINIC | Facility: CLINIC | Age: 50
End: 2025-01-13

## 2025-01-14 ENCOUNTER — TELEPHONE (OUTPATIENT)
Age: 50
End: 2025-01-14

## 2025-01-14 NOTE — TELEPHONE ENCOUNTER
Elvira called and wanted to speak with Polina, I called Teri and was told that Polina is at the [place] office. I called the [place] office and was told that they can not assist her. While calling the patient disconnected the call. Please call Elvira back

## 2025-01-15 NOTE — TELEPHONE ENCOUNTER
Spoke with pt, She has not made appts for referrals as requested. Pt has returned back to work. I stressed the importance of being compliant with follow through for the benefit of her overall well being. Pt agrees with plan.   She will contact Hem/Onc to schedule an appt ASAP and she will contact her insurance to see if she can use an [facility]-as pt has returned to work and is unable to complete the in person [facility] she was originally referred to.     Appt was scheduled for a follow up on 2/20/25 for the last appt of the day with Dr. Rivera.

## 2025-01-30 ENCOUNTER — OFFICE VISIT (OUTPATIENT)
Dept: FAMILY MEDICINE CLINIC | Facility: CLINIC | Age: 50
End: 2025-01-30
Payer: COMMERCIAL

## 2025-01-30 VITALS
HEIGHT: 64 IN | OXYGEN SATURATION: 100 % | BODY MASS INDEX: 33.73 KG/M2 | HEART RATE: 92 BPM | SYSTOLIC BLOOD PRESSURE: 122 MMHG | RESPIRATION RATE: 16 BRPM | TEMPERATURE: 97.3 F | WEIGHT: 197.6 LBS | DIASTOLIC BLOOD PRESSURE: 82 MMHG

## 2025-01-30 DIAGNOSIS — E66.01 MORBID (SEVERE) OBESITY DUE TO EXCESS CALORIES (HCC): Primary | Chronic | ICD-10-CM

## 2025-01-30 DIAGNOSIS — F41.8 DEPRESSION WITH ANXIETY: Chronic | ICD-10-CM

## 2025-01-30 DIAGNOSIS — D25.9 UTERINE LEIOMYOMA, UNSPECIFIED LOCATION: ICD-10-CM

## 2025-01-30 DIAGNOSIS — R79.0 LOW FERRITIN: ICD-10-CM

## 2025-01-30 DIAGNOSIS — E78.2 MIXED HYPERLIPIDEMIA: ICD-10-CM

## 2025-01-30 DIAGNOSIS — E61.1 IRON DEFICIENCY: ICD-10-CM

## 2025-01-30 PROCEDURE — 99214 OFFICE O/P EST MOD 30 MIN: CPT | Performed by: FAMILY MEDICINE

## 2025-01-30 NOTE — PATIENT INSTRUCTIONS
GYN OV  Please call hematology and reschedule ASAP  Okay to take iron every other day to avoid constipation  Iron rich diet   We will fill out intermittent FMLA  We may consider reducing dose of Effexor as long as you continue feeling well, message me  Please start multivitamin and biotin for hair and nail  Please repeat lipid panel, liver tests, iron level and CBC prior to hematology visit

## 2025-01-30 NOTE — PROGRESS NOTES
Name: Elvira Pearce      : 1975      MRN: [id number]  Encounter Provider: Jerrica Beavers MD  Encounter Date: 2025   Encounter department: [facility]    Assessment & Plan  Morbid (severe) obesity due to excess calories (HCC)  43 pound weight loss  Wellbutrin  mg daily  Naltrexone 25 mg daily  Topamax 100 mg nightly         Iron deficiency  Trouble tolerating oral iron, persistent fatigue  Heavy menses with clots, uterine fibroid  Most recent ferritin level in October was low at 4  Referral to hematology oncology, proceed ASAP, likely will benefit from iron infusions  Monitor blood work    Orders:  •  Ambulatory referral to Hematology / Oncology; Future  •  Iron Panel (Includes Ferritin, Iron Sat%, Iron, and TIBC); Future  •  CBC and differential; Future    Low ferritin    Orders:  •  Ambulatory referral to Hematology / Oncology; Future  •  Iron Panel (Includes Ferritin, Iron Sat%, Iron, and TIBC); Future  •  CBC and differential; Future    Uterine leiomyoma, unspecified location  Patient should continue close follow-up with GYN  Pelvic ultrasound [facility] 2024: 3 uterine fibroids, endometrial lining is unremarkable  Orders:  •  Ambulatory Referral to Obstetrics / Gynecology; Future    Mixed hyperlipidemia  Rosuvastatin 10 mg daily.  Proceed with blood work.  Patient lost 43 pounds  Orders:  •  Lipid Panel with Direct LDL reflex; Future  •  Hepatic function panel; Future    Depression with anxiety  Depression Screening Follow-up Plan: Patient's depression screening was positive with a PHQ-9 score of 7. Patient with underlying depression and was advised to continue current medications as prescribed. Patient advised to follow-up with PCP for further management.  Patient reports improvement of anxiety/depression symptoms.  Continue Wellbutrin  mg daily and Klonopin PRN nightly         Depression Screening and Follow-up Plan:   Patient with underlying depression and  was advised to continue current medications as prescribed.     Patient Instructions   GYN OV  Please call hematology and reschedule ASAP  Okay to take iron every other day to avoid constipation  Iron rich diet   We will fill out intermittent FMLA  We may consider reducing dose of Effexor as long as you continue feeling well, message me  Please start multivitamin and biotin for hair and nail  Please repeat lipid panel, liver tests, iron level and CBC prior to hematology visit    History of Present Illness     Follow-up.  Patient feels well.  Very pleased with recent 43 pound weight loss.    Reports overall improvement of depression on current med regimen   Patient uses Wellbutrin  mg daily along with 25 mg of naltrexone for appetite suppression   She uses Klonopin as needed at bedtime   Feels very fatigued.    Menses are heavy, 5 days, clots.  Patient has not been able to take iron daily due to side effect of constipation   Unfortunately she has canceled her appointment with hematology oncology to discuss iron infusions   I reviewed most recent blood work results with the patient indicating low ferritin level.    Patient has not proceeded with partial hospitalization program as suggested during last office visit.  Does not feel the need at present time.  She is requesting that PCP feels out intermittent FMLA for her doctors appointments and occasional absences due to chronic medical conditions    Patient had pelvic ultrasound and is awaiting for follow-up with GYN at [facility].  She may consider switching her care to [facility] group.        Review of Systems   Constitutional:  Positive for fatigue.   HENT: Negative.     Respiratory: Negative.     Cardiovascular: Negative.    Gastrointestinal:  Positive for constipation.   Genitourinary:  Positive for menstrual problem.   Musculoskeletal: Negative.    Neurological:  Positive for headaches.        Chronic migraines   Psychiatric/Behavioral:  Positive for dysphoric  mood. The patient is nervous/anxious.         Symptoms of anxiety/depression have improved     Past Medical History:   Diagnosis Date   • Anxiety    • Depression    • Headache(784.0)    • Obesity      Past Surgical History:   Procedure Laterality Date   • NO PAST SURGERIES       Family History   Problem Relation Age of Onset   • Diabetes Mother    • Cerebral aneurysm Mother    • Hypertension Mother    • Heart attack Father 62   • Hypertension Brother    • Breast cancer Maternal Aunt    • Substance Abuse Brother    • Thyroid disease Brother    • Breast cancer Maternal Aunt      Social History     Tobacco Use   • Smoking status: Former     Current packs/day: 0.00     Types: Cigarettes     Start date: 2018     Quit date: 3/30/2020     Years since quittin.8   • Smokeless tobacco: Current   Vaping Use   • Vaping status: Every Day   • Substances: Nicotine   Substance and Sexual Activity   • Alcohol use: Not Currently     Comment: Social   • Drug use: Not Currently     Types: Marijuana   • Sexual activity: Yes     Partners: Male     Birth control/protection: Other     Current Outpatient Medications on File Prior to Visit   Medication Sig   • buPROPion (WELLBUTRIN XL) 300 mg 24 hr tablet Take 1 tablet (300 mg total) by mouth every morning   • ferrous sulfate 324 (65 Fe) mg Take 1 tablet (324 mg total) by mouth daily   • ketorolac (TORADOL) 10 mg tablet Take 1 tablet (10 mg total) by mouth daily as needed for moderate pain or severe pain (Headache)   • naltrexone (REVIA) 50 mg tablet TAKE 1/2 TABLET(25 MG) BY MOUTH DAILY   • ondansetron (ZOFRAN-ODT) 4 mg disintegrating tablet Take 1 tablet (4 mg total) by mouth every 8 (eight) hours as needed for nausea or vomiting   • pantoprazole (PROTONIX) 40 mg tablet Take 1 tablet (40 mg total) by mouth daily   • polyethylene glycol (GLYCOLAX) 17 GM/SCOOP powder TAKE 17 GRAMS BY MOUTH EVERY DAY   • rizatriptan (MAXALT-MLT) 10 mg disintegrating tablet Take at the onset of  "migraine; if symptoms continue or return, may take another dose at least 2 hours after first dose. Take no more than 2 doses in a day.   • rosuvastatin (CRESTOR) 10 MG tablet Take 1 tablet (10 mg total) by mouth daily   • topiramate (TOPAMAX) 100 mg tablet TAKE 1 TABLET BY MOUTH AT  BEDTIME   • valACYclovir (VALTREX) 500 mg tablet Take 500 mg by mouth   • venlafaxine (EFFEXOR-XR) 75 mg 24 hr capsule Take 1 capsule (75 mg total) by mouth daily     Allergies   Allergen Reactions   • Clarithromycin Other (See Comments)     nausea   • Sulfa Antibiotics GI Intolerance   • Sulfamethoxazole-Trimethoprim Other (See Comments)     rash     Immunization History   Administered Date(s) Administered   • COVID-19 PFIZER VACCINE 0.3 ML IM 01/10/2021, 01/29/2021   • COVID-19 Pfizer vac (Akira-sucrose, gray cap) 12 yr+ IM 03/19/2022   • INFLUENZA 10/16/2015, 10/31/2018, 10/05/2019, 11/08/2020, 11/30/2021, 10/03/2023   • Influenza, injectable, quadrivalent, preservative free 0.5 mL 11/30/2021, 10/03/2023   • Influenza, seasonal, injectable 10/06/2016   • Rabies Immune Globulin 12/19/2024   • Rabies-IM Human Diploid Cell Culture 12/19/2024, 12/21/2024   • Tdap 12/19/2024     Objective   /82 (BP Location: Right arm, Patient Position: Sitting, Cuff Size: Standard)   Pulse 92   Temp (!) 97.3 °F (36.3 °C) (Temporal)   Resp 16   Ht 5' 3.75\" (1.619 m)   Wt 89.6 kg (197 lb 9.6 oz)   SpO2 100%   BMI 34.18 kg/m²     Physical Exam  Vitals and nursing note reviewed.   Constitutional:       General: She is not in acute distress.     Appearance: Normal appearance. She is well-developed. She is not ill-appearing.   HENT:      Head: Normocephalic and atraumatic.   Eyes:      General: No scleral icterus.     Conjunctiva/sclera: Conjunctivae normal.   Neck:      Vascular: No carotid bruit.   Cardiovascular:      Rate and Rhythm: Normal rate and regular rhythm.      Heart sounds: Normal heart sounds. No murmur heard.  Pulmonary:      Effort: " Pulmonary effort is normal. No respiratory distress.      Breath sounds: Normal breath sounds.   Abdominal:      General: Bowel sounds are normal. There is no abdominal bruit.      Palpations: Abdomen is soft.      Tenderness: There is no abdominal tenderness.   Musculoskeletal:      Cervical back: Neck supple. No rigidity.      Right lower leg: No edema.      Left lower leg: No edema.   Skin:     General: Skin is warm.   Neurological:      General: No focal deficit present.      Mental Status: She is alert and oriented to person, place, and time.   Psychiatric:         Mood and Affect: Mood normal.         Behavior: Behavior normal.

## 2025-01-30 NOTE — ASSESSMENT & PLAN NOTE
Rosuvastatin 10 mg daily.  Proceed with blood work.  Patient lost 43 pounds  Orders:  •  Lipid Panel with Direct LDL reflex; Future  •  Hepatic function panel; Future

## 2025-01-30 NOTE — ASSESSMENT & PLAN NOTE
Trouble tolerating oral iron, persistent fatigue  Heavy menses with clots, uterine fibroid  Most recent ferritin level in October was low at 4  Referral to hematology oncology, proceed ASAP, likely will benefit from iron infusions  Monitor blood work    Orders:  •  Ambulatory referral to Hematology / Oncology; Future  •  Iron Panel (Includes Ferritin, Iron Sat%, Iron, and TIBC); Future  •  CBC and differential; Future

## 2025-01-30 NOTE — ASSESSMENT & PLAN NOTE
Patient should continue close follow-up with GYN  Pelvic ultrasound [facility] October 2024: 3 uterine fibroids, endometrial lining is unremarkable  Orders:  •  Ambulatory Referral to Obstetrics / Gynecology; Future

## 2025-01-30 NOTE — ASSESSMENT & PLAN NOTE
Orders:  •  Ambulatory referral to Hematology / Oncology; Future  •  Iron Panel (Includes Ferritin, Iron Sat%, Iron, and TIBC); Future  •  CBC and differential; Future

## 2025-01-31 ENCOUNTER — PATIENT MESSAGE (OUTPATIENT)
Dept: FAMILY MEDICINE CLINIC | Facility: CLINIC | Age: 50
End: 2025-01-31

## 2025-01-31 ENCOUNTER — TELEPHONE (OUTPATIENT)
Age: 50
End: 2025-01-31

## 2025-01-31 RX ORDER — CLONAZEPAM 0.5 MG/1
TABLET ORAL
Qty: 30 TABLET | Refills: 2 | Status: SHIPPED | OUTPATIENT
Start: 2025-01-31

## 2025-02-03 ENCOUNTER — PATIENT MESSAGE (OUTPATIENT)
Dept: FAMILY MEDICINE CLINIC | Facility: CLINIC | Age: 50
End: 2025-02-03

## 2025-02-03 NOTE — ASSESSMENT & PLAN NOTE
Depression Screening Follow-up Plan: Patient's depression screening was positive with a PHQ-9 score of 7. Patient with underlying depression and was advised to continue current medications as prescribed. Patient advised to follow-up with PCP for further management.  Patient reports improvement of anxiety/depression symptoms.  Continue Wellbutrin  mg daily and Klonopin PRN nightly

## 2025-02-04 DIAGNOSIS — E66.01 MORBID (SEVERE) OBESITY DUE TO EXCESS CALORIES (HCC): Chronic | ICD-10-CM

## 2025-02-04 DIAGNOSIS — F41.8 DEPRESSION WITH ANXIETY: ICD-10-CM

## 2025-02-04 RX ORDER — BUPROPION HYDROCHLORIDE 300 MG/1
300 TABLET ORAL EVERY MORNING
Qty: 90 TABLET | Refills: 1 | Status: SHIPPED | OUTPATIENT
Start: 2025-02-04

## 2025-02-04 RX ORDER — NALTREXONE HYDROCHLORIDE 50 MG/1
25 TABLET, FILM COATED ORAL DAILY
Qty: 45 TABLET | Refills: 1 | Status: SHIPPED | OUTPATIENT
Start: 2025-02-04

## 2025-02-04 NOTE — TELEPHONE ENCOUNTER
Patient is calling to request a refill on the pended medication, please send to [facility] mail service  Please review

## 2025-02-05 ENCOUNTER — PATIENT MESSAGE (OUTPATIENT)
Dept: FAMILY MEDICINE CLINIC | Facility: CLINIC | Age: 50
End: 2025-02-05

## 2025-02-18 ENCOUNTER — TELEPHONE (OUTPATIENT)
Age: 50
End: 2025-02-18

## 2025-02-18 NOTE — TELEPHONE ENCOUNTER
Patient called stating there was a leave of absence form that needed more information from Dr Beavers. The patients was told she needed to be more specific in certain parts. She will reach out to them to have them fax the forms to us. Please advise back to patient if needed.

## 2025-02-18 NOTE — TELEPHONE ENCOUNTER
Patient would like a call back regarding her FMLA form that needed to be updated.     Please call patient (she did state that this is time sensitive) Thank you!

## 2025-02-18 NOTE — TELEPHONE ENCOUNTER
Spoke to patient, started that number 2 on the paperwork needs to have diagnosis, will update forms for patient

## 2025-02-20 ENCOUNTER — APPOINTMENT (OUTPATIENT)
Dept: LAB | Facility: CLINIC | Age: 50
End: 2025-02-20
Payer: COMMERCIAL

## 2025-02-20 ENCOUNTER — TELEPHONE (OUTPATIENT)
Age: 50
End: 2025-02-20

## 2025-02-20 DIAGNOSIS — E78.2 MIXED HYPERLIPIDEMIA: ICD-10-CM

## 2025-02-20 DIAGNOSIS — E61.1 IRON DEFICIENCY: ICD-10-CM

## 2025-02-20 DIAGNOSIS — R79.0 LOW FERRITIN: ICD-10-CM

## 2025-02-20 LAB
ALBUMIN SERPL BCG-MCNC: 4.7 G/DL (ref 3.5–5)
ALP SERPL-CCNC: 60 U/L (ref 34–104)
ALT SERPL W P-5'-P-CCNC: 8 U/L (ref 7–52)
AST SERPL W P-5'-P-CCNC: 10 U/L (ref 13–39)
BASOPHILS # BLD AUTO: 0.06 THOUSANDS/ΜL (ref 0–0.1)
BASOPHILS NFR BLD AUTO: 1 % (ref 0–1)
BILIRUB DIRECT SERPL-MCNC: 0.16 MG/DL (ref 0–0.2)
BILIRUB SERPL-MCNC: 0.83 MG/DL (ref 0.2–1)
EOSINOPHIL # BLD AUTO: 0.37 THOUSAND/ΜL (ref 0–0.61)
EOSINOPHIL NFR BLD AUTO: 6 % (ref 0–6)
ERYTHROCYTE [DISTWIDTH] IN BLOOD BY AUTOMATED COUNT: 13.5 % (ref 11.6–15.1)
HCT VFR BLD AUTO: 40.7 % (ref 34.8–46.1)
HGB BLD-MCNC: 13.4 G/DL (ref 11.5–15.4)
IMM GRANULOCYTES # BLD AUTO: 0.01 THOUSAND/UL (ref 0–0.2)
IMM GRANULOCYTES NFR BLD AUTO: 0 % (ref 0–2)
IRON SATN MFR SERPL: 34 % (ref 15–50)
IRON SERPL-MCNC: 114 UG/DL (ref 50–212)
LYMPHOCYTES # BLD AUTO: 1.62 THOUSANDS/ΜL (ref 0.6–4.47)
LYMPHOCYTES NFR BLD AUTO: 27 % (ref 14–44)
MCH RBC QN AUTO: 30.7 PG (ref 26.8–34.3)
MCHC RBC AUTO-ENTMCNC: 32.9 G/DL (ref 31.4–37.4)
MCV RBC AUTO: 93 FL (ref 82–98)
MONOCYTES # BLD AUTO: 0.63 THOUSAND/ΜL (ref 0.17–1.22)
MONOCYTES NFR BLD AUTO: 11 % (ref 4–12)
NEUTROPHILS # BLD AUTO: 3.33 THOUSANDS/ΜL (ref 1.85–7.62)
NEUTS SEG NFR BLD AUTO: 55 % (ref 43–75)
NRBC BLD AUTO-RTO: 0 /100 WBCS
PLATELET # BLD AUTO: 331 THOUSANDS/UL (ref 149–390)
PMV BLD AUTO: 10.5 FL (ref 8.9–12.7)
PROT SERPL-MCNC: 7.4 G/DL (ref 6.4–8.4)
RBC # BLD AUTO: 4.37 MILLION/UL (ref 3.81–5.12)
TIBC SERPL-MCNC: 338.8 UG/DL (ref 250–450)
TRANSFERRIN SERPL-MCNC: 242 MG/DL (ref 203–362)
UIBC SERPL-MCNC: 225 UG/DL (ref 155–355)
WBC # BLD AUTO: 6.02 THOUSAND/UL (ref 4.31–10.16)

## 2025-02-20 PROCEDURE — 83540 ASSAY OF IRON: CPT

## 2025-02-20 PROCEDURE — 36415 COLL VENOUS BLD VENIPUNCTURE: CPT

## 2025-02-20 PROCEDURE — 82728 ASSAY OF FERRITIN: CPT

## 2025-02-20 PROCEDURE — 83550 IRON BINDING TEST: CPT

## 2025-02-20 PROCEDURE — 85025 COMPLETE CBC W/AUTO DIFF WBC: CPT

## 2025-02-20 PROCEDURE — 80076 HEPATIC FUNCTION PANEL: CPT

## 2025-02-21 DIAGNOSIS — F41.8 DEPRESSION WITH ANXIETY: ICD-10-CM

## 2025-02-21 LAB — FERRITIN SERPL-MCNC: 10 NG/ML (ref 11–307)

## 2025-02-21 RX ORDER — VENLAFAXINE HYDROCHLORIDE 75 MG/1
75 CAPSULE, EXTENDED RELEASE ORAL DAILY
Qty: 30 CAPSULE | Refills: 5 | Status: SHIPPED | OUTPATIENT
Start: 2025-02-21

## 2025-02-24 NOTE — TELEPHONE ENCOUNTER
Pt called in and stated that she spoke with [facility] and they stated that they had not received the corrected forms Eileen had faxed on 2/19/25.    In response to this issue, I re-faxed the forms to the provided fax number, and I also called them to confirm if they had received the forms.     The women I spoke to stated that they received all the correct information on 2/19/2025 and the only thing to do now is to wait for their specialist to review it.    [facility] will be in touch with the Pt when they are finished reviewing paperwork.    Called Pt and made her aware.

## 2025-02-25 ENCOUNTER — RESULTS FOLLOW-UP (OUTPATIENT)
Dept: FAMILY MEDICINE CLINIC | Facility: CLINIC | Age: 50
End: 2025-02-25

## 2025-03-10 DIAGNOSIS — K21.9 CHRONIC GERD: ICD-10-CM

## 2025-03-11 RX ORDER — PANTOPRAZOLE SODIUM 40 MG/1
40 TABLET, DELAYED RELEASE ORAL DAILY
Qty: 90 TABLET | Refills: 3 | Status: SHIPPED | OUTPATIENT
Start: 2025-03-11

## 2025-03-26 ENCOUNTER — OFFICE VISIT (OUTPATIENT)
Dept: FAMILY MEDICINE CLINIC | Facility: CLINIC | Age: 50
End: 2025-03-26
Payer: COMMERCIAL

## 2025-03-26 VITALS
RESPIRATION RATE: 16 BRPM | OXYGEN SATURATION: 99 % | BODY MASS INDEX: 30.93 KG/M2 | WEIGHT: 181.2 LBS | HEIGHT: 64 IN | SYSTOLIC BLOOD PRESSURE: 118 MMHG | HEART RATE: 86 BPM | TEMPERATURE: 97.8 F | DIASTOLIC BLOOD PRESSURE: 82 MMHG

## 2025-03-26 DIAGNOSIS — R79.0 LOW FERRITIN: ICD-10-CM

## 2025-03-26 DIAGNOSIS — M62.838 NECK MUSCLE SPASM: ICD-10-CM

## 2025-03-26 DIAGNOSIS — F41.8 DEPRESSION WITH ANXIETY: Chronic | ICD-10-CM

## 2025-03-26 DIAGNOSIS — E78.2 MIXED HYPERLIPIDEMIA: ICD-10-CM

## 2025-03-26 DIAGNOSIS — K21.9 GASTROESOPHAGEAL REFLUX DISEASE, UNSPECIFIED WHETHER ESOPHAGITIS PRESENT: ICD-10-CM

## 2025-03-26 DIAGNOSIS — Z00.00 ENCOUNTER FOR WELLNESS EXAMINATION IN ADULT: Primary | ICD-10-CM

## 2025-03-26 DIAGNOSIS — D25.9 UTERINE LEIOMYOMA, UNSPECIFIED LOCATION: ICD-10-CM

## 2025-03-26 PROBLEM — E66.01 MORBID (SEVERE) OBESITY DUE TO EXCESS CALORIES (HCC): Chronic | Status: RESOLVED | Noted: 2017-07-29 | Resolved: 2025-03-26

## 2025-03-26 PROBLEM — F32.2 SEVERE MAJOR DEPRESSIVE DISORDER (HCC): Status: ACTIVE | Noted: 2025-03-26

## 2025-03-26 PROBLEM — F32.2 SEVERE MAJOR DEPRESSIVE DISORDER (HCC): Status: RESOLVED | Noted: 2025-03-26 | Resolved: 2025-03-26

## 2025-03-26 PROCEDURE — 96372 THER/PROPH/DIAG INJ SC/IM: CPT

## 2025-03-26 PROCEDURE — 99213 OFFICE O/P EST LOW 20 MIN: CPT | Performed by: FAMILY MEDICINE

## 2025-03-26 PROCEDURE — 99396 PREV VISIT EST AGE 40-64: CPT | Performed by: FAMILY MEDICINE

## 2025-03-26 RX ORDER — METHOCARBAMOL 750 MG/1
750 TABLET, FILM COATED ORAL 2 TIMES DAILY PRN
Qty: 60 TABLET | Refills: 1 | Status: SHIPPED | OUTPATIENT
Start: 2025-03-26

## 2025-03-26 RX ORDER — KETOROLAC TROMETHAMINE 30 MG/ML
30 INJECTION, SOLUTION INTRAMUSCULAR; INTRAVENOUS ONCE
Status: COMPLETED | OUTPATIENT
Start: 2025-03-26 | End: 2025-03-26

## 2025-03-26 RX ADMIN — KETOROLAC TROMETHAMINE 30 MG: 30 INJECTION, SOLUTION INTRAMUSCULAR; INTRAVENOUS at 15:30

## 2025-03-26 NOTE — PROGRESS NOTES
Adult Annual Physical  Name: Elvira Pearce      : 1975      MRN: [id number]  Encounter Provider: Jerrica Beavers MD  Encounter Date: 3/26/2025   Encounter department: [facility]    Assessment & Plan  Encounter for wellness examination in adult         Mixed hyperlipidemia  Continue rosuvastatin.  Commended patient on recent weight loss.  Pending labs    Orders:  •  Comprehensive metabolic panel; Future  •  Lipid Panel with Direct LDL reflex; Future    Neck muscle spasm  Painful neck spasm.  Trial of methocarbamol, warned patient's about sedating side effects, start medication at night, do not combine with Klonopin  Injection of Toradol was administered today  Orders:  •  methocarbamol (Robaxin-750) 750 mg tablet; Take 1 tablet (750 mg total) by mouth 2 (two) times a day as needed for muscle spasms (neck pain)  •  ketorolac (TORADOL) injection 30 mg    Low ferritin  Low ferritin level at 10.  Continue oral iron.  Chronic fatigue.  Completed GI workup.  Pending follow-ups with GYN and hematology.  Patient would likely benefit from IV iron infusions       Depression with anxiety  Symptoms are well-controlled, continue regimen of Wellbutrin and Effexor       Gastroesophageal reflux disease, unspecified whether esophagitis present  Continue pantoprazole, symptoms are well-controlled       Uterine leiomyoma, unspecified location  Pending consultation with [facility].  Prominent uterus appearance was noted during recent colonoscopy.  Subsequent pelvic ultrasound at [facility] 2024: Subserosal and intramural fibroids.  Heavy menses.  Low ferritin level         Preventive Screenings:  - Diabetes Screening: screening up-to-date  - Cholesterol Screening: screening not indicated and has hyperlipidemia   - HIV screening: screening up-to-date   - Cervical cancer screening: risks/benefits discussed and orders placed   - Breast cancer screening: screening up-to-date   - Colon cancer screening:  screening up-to-date   - Lung cancer screening: screening not indicated     Counseling/Anticipatory Guidance:    - Diet: discussed recommendations for a healthy/well-balanced diet.   - Exercise: the importance of regular exercise/physical activity was discussed. Recommend exercise 3-5 times per week for at least 30 minutes.        Patient Instructions   Slow Fe 45 mg-consider as alternative to iron sulfate, less GI side effects, can take every other day or every 2 days as tolerated  Methocarbamol, as needed for neck spasm  Toradol was administered today  Orders for CMP and lipid panel you can proceed with labs ordered by hematology  Impressive weight loss of 68 pounds within past 9 months!  Please discuss scheduling IV iron infusions and iron checks at forthcoming appointment with hematology    History of Present Illness     Adult Annual Physical:  Patient presents for annual physical. Annual well exam  Patient reports feeling significantly better  Overall lost 68 lb weight loss with lifestyle/dietary and Rx efforts.  Patient remains on regimen of Wellbutrin  mg daily and naltrexone 25 mg daily along with Topamax 100 mg nightly.  Feels that current meds work well for weight loss depression, anxiety.Improved energy, sleeps better.  Admits to chronic fatigue.  Results of recent blood work reviewed.  Ferritin level remains low at 10 albeit improved since October 2024 when it was 14.  Patient is scheduled for consultation with [facility] hematology and GYN.    Patient takes Klonopin at night only PRN, not on a daily basis.  Anxiety symptoms have been stably controlled on Effexor ER 75 mg daily    Here today complaining of painful neck spasm and tension headaches, no upper extremity radiculopathy, no preceding injury or fall.  Chronic migraine headaches have been well-controlled.    Medications updated.    Patient denies recurrences abdominal pain or dyspepsia. .     Diet and Physical Activity:  - Diet/Nutrition:  "well balanced diet.  - Exercise: walking.    General Health:  - Sleep: sleeps well.  - Vision: most recent eye exam < 1 year ago.  - Dental: regular dental visits.    /GYN Health:    - Menopause: premenopausal.     Review of Systems   Constitutional: Negative.    HENT: Negative.     Eyes: Negative.    Respiratory: Negative.     Cardiovascular: Negative.    Gastrointestinal: Negative.    Endocrine: Negative.    Genitourinary: Negative.    Musculoskeletal:  Positive for myalgias and neck pain.   Allergic/Immunologic: Negative.    Neurological: Negative.    Hematological: Negative.    Psychiatric/Behavioral: Negative.       Medical History Reviewed by provider this encounter:  Tobacco  Allergies  Meds  Problems  Med Hx  Surg Hx  Fam Hx     .    Objective   /82 (BP Location: Left arm, Patient Position: Sitting, Cuff Size: Large)   Pulse 86   Temp 97.8 °F (36.6 °C) (Temporal)   Resp 16   Ht 5' 3.75\" (1.619 m)   Wt 82.2 kg (181 lb 3.2 oz)   SpO2 99%   BMI 31.35 kg/m²     Physical Exam  Vitals and nursing note reviewed.   Constitutional:       General: She is not in acute distress.     Appearance: Normal appearance. She is well-developed. She is not ill-appearing.   HENT:      Head: Normocephalic and atraumatic.   Eyes:      General: No scleral icterus.     Conjunctiva/sclera: Conjunctivae normal.   Neck:      Vascular: No carotid bruit.   Cardiovascular:      Rate and Rhythm: Normal rate and regular rhythm.      Heart sounds: Normal heart sounds. No murmur heard.  Pulmonary:      Effort: Pulmonary effort is normal. No respiratory distress.      Breath sounds: Normal breath sounds. No wheezing or rhonchi.   Abdominal:      General: Bowel sounds are normal. There is no distension or abdominal bruit.      Palpations: Abdomen is soft.      Tenderness: There is no abdominal tenderness.      Hernia: No hernia is present.   Musculoskeletal:      Cervical back: Neck supple. No rigidity.      Right lower leg: " No edema.      Left lower leg: No edema.      Comments: Paraspinal cervical spasm, torticollis   Skin:     General: Skin is warm.   Neurological:      General: No focal deficit present.      Mental Status: She is alert and oriented to person, place, and time.   Psychiatric:         Mood and Affect: Mood normal.         Behavior: Behavior normal.         Thought Content: Thought content normal.

## 2025-03-26 NOTE — PATIENT INSTRUCTIONS
Slow Fe 45 mg-consider as alternative to iron sulfate, less GI side effects, can take every other day or every 2 days as tolerated  Methocarbamol, as needed for neck spasm  Toradol was administered today  Orders for CMP and lipid panel you can proceed with labs ordered by hematology  Impressive weight loss of 68 pounds within past 9 months!  Please discuss scheduling IV iron infusions and iron checks at forthcoming appointment with hematology

## 2025-03-26 NOTE — ASSESSMENT & PLAN NOTE
Low ferritin level at 10.  Continue oral iron.  Chronic fatigue.  Completed GI workup.  Pending follow-ups with GYN and hematology.  Patient would likely benefit from IV iron infusions

## 2025-03-26 NOTE — ASSESSMENT & PLAN NOTE
Continue rosuvastatin.  Commended patient on recent weight loss.  Pending labs    Orders:  •  Comprehensive metabolic panel; Future  •  Lipid Panel with Direct LDL reflex; Future

## 2025-03-30 DIAGNOSIS — E78.2 MIXED HYPERLIPIDEMIA: ICD-10-CM

## 2025-03-30 RX ORDER — ROSUVASTATIN CALCIUM 10 MG/1
10 TABLET, COATED ORAL DAILY
Qty: 90 TABLET | Refills: 3 | Status: SHIPPED | OUTPATIENT
Start: 2025-03-30

## 2025-03-31 PROBLEM — R79.89 ELEVATED SERUM CREATININE: Status: RESOLVED | Noted: 2024-04-26 | Resolved: 2025-03-31

## 2025-03-31 NOTE — ASSESSMENT & PLAN NOTE
Pending consultation with [facility].  Prominent uterus appearance was noted during recent colonoscopy.  Subsequent pelvic ultrasound at [facility] October 2024: Subserosal and intramural fibroids.  Heavy menses.  Low ferritin level

## 2025-04-11 ENCOUNTER — TELEPHONE (OUTPATIENT)
Age: 50
End: 2025-04-11

## 2025-04-11 NOTE — TELEPHONE ENCOUNTER
Elvira is requesting a call back from Dr. Beavers regarding FMLA and a personal issue. She mentioned she was also served with divorce paperwork which has put her back in a state of depression and would like a moment of Dr. Beavers's time to discuss that with her.    Please contact to advise.

## 2025-04-11 NOTE — TELEPHONE ENCOUNTER
Please contact the patient    I will see him in the office on Tuesday    If symptoms of depression will worsen at any time-please advise to proceed to emergency room for evaluation and treatment    Thank you

## 2025-04-11 NOTE — TELEPHONE ENCOUNTER
Spoke to patient, stated that she isn't suicidal, stated she just doesn't know how to feel, patient did state that she is willing to do partial program, she did state she has reached out to a therapist as well but has yet to hear back.

## 2025-04-11 NOTE — TELEPHONE ENCOUNTER
Spoke to patient, stated that she is depressed and hasn't been able to get out of bed in 3 days and can't think    Spoke with Dr. Beavers stated patient needs to be seen if she would like new paperwork to be filled out

## 2025-04-14 ENCOUNTER — TELEPHONE (OUTPATIENT)
Dept: OBGYN CLINIC | Facility: CLINIC | Age: 50
End: 2025-04-14

## 2025-04-14 ENCOUNTER — TELEPHONE (OUTPATIENT)
Dept: OTHER | Facility: OTHER | Age: 50
End: 2025-04-14

## 2025-04-14 NOTE — TELEPHONE ENCOUNTER
Patient is calling regarding cancelling an appointment.     Date/Time:TODAY, 4/14 @ 8:00AM     Patient was rescheduled: YES [ ] NO [x ]     Patient requesting call back to reschedule: YES [x ] NO [ ]  *[facility] can no longer reschedule appointments. Please call patient back to assist with rescheduling. Thank you!

## 2025-04-15 NOTE — TELEPHONE ENCOUNTER
Pt cancelled her appt today and said that she is not going to go on FMLA full time, she would like it keep it intermittent for now. Any questions please reach out to her.

## 2025-05-04 PROBLEM — N76.4 VULVAR ABSCESS: Status: ACTIVE | Noted: 2019-12-16

## 2025-05-04 PROBLEM — R10.2 PELVIC PAIN IN FEMALE: Status: ACTIVE | Noted: 2019-12-02

## 2025-05-04 PROBLEM — E66.9 OBESITY (BMI 30-39.9): Chronic | Status: ACTIVE | Noted: 2017-07-29

## 2025-05-04 PROBLEM — N94.9 GENITAL LESION, FEMALE: Status: ACTIVE | Noted: 2019-11-12

## 2025-05-04 PROBLEM — Z79.2 ENCOUNTER FOR LONG-TERM (CURRENT) USE OF ANTIBIOTICS: Status: ACTIVE | Noted: 2019-12-23

## 2025-05-04 PROBLEM — R10.32 ACUTE LEFT LOWER QUADRANT PAIN: Status: ACTIVE | Noted: 2019-10-11

## 2025-05-05 ENCOUNTER — TELEPHONE (OUTPATIENT)
Dept: OTHER | Facility: OTHER | Age: 50
End: 2025-05-05

## 2025-05-05 NOTE — TELEPHONE ENCOUNTER
Patient is calling regarding cancelling an appointment.    Date/Time: 5/5/25 @ 9:00 am    Patient was rescheduled: YES [] NO [x]    Patient requesting call back to reschedule: YES [] NO [x]    Pt will call back to reschedule

## 2025-05-07 ENCOUNTER — PATIENT MESSAGE (OUTPATIENT)
Dept: FAMILY MEDICINE CLINIC | Facility: CLINIC | Age: 50
End: 2025-05-07

## 2025-05-12 ENCOUNTER — TELEPHONE (OUTPATIENT)
Dept: HEMATOLOGY ONCOLOGY | Facility: CLINIC | Age: 50
End: 2025-05-12

## 2025-05-12 NOTE — TELEPHONE ENCOUNTER
Spoke with pt regarding missed Consult today.  She overslept through her alarm.  She will call [facility]# [phone] to reschedule this appt

## 2025-06-11 ENCOUNTER — TELEPHONE (OUTPATIENT)
Dept: HEMATOLOGY ONCOLOGY | Facility: CLINIC | Age: 50
End: 2025-06-11

## 2025-06-11 ENCOUNTER — TELEPHONE (OUTPATIENT)
Dept: OTHER | Facility: OTHER | Age: 50
End: 2025-06-11

## 2025-06-11 NOTE — TELEPHONE ENCOUNTER
Patient NS appt w/ Mylene Goldman 6/11 @ 900 am. Left hope line [phone] for pt to call back and rs the missed appt.

## 2025-06-11 NOTE — TELEPHONE ENCOUNTER
Patient is calling regarding cancelling an appointment.    Date/Time: 6/11/2025 / 9:00 am     Patient was rescheduled: YES [] NO [x]    Patient requesting call back to reschedule: YES [x] NO []

## 2025-06-12 DIAGNOSIS — F41.8 DEPRESSION WITH ANXIETY: ICD-10-CM

## 2025-06-13 RX ORDER — VENLAFAXINE HYDROCHLORIDE 75 MG/1
75 CAPSULE, EXTENDED RELEASE ORAL DAILY
Qty: 30 CAPSULE | Refills: 5 | Status: SHIPPED | OUTPATIENT
Start: 2025-06-13

## 2025-06-24 ENCOUNTER — TELEPHONE (OUTPATIENT)
Dept: OBGYN CLINIC | Facility: CLINIC | Age: 50
End: 2025-06-24

## 2025-07-06 DIAGNOSIS — F41.8 DEPRESSION WITH ANXIETY: ICD-10-CM

## 2025-07-07 RX ORDER — BUPROPION HYDROCHLORIDE 300 MG/1
300 TABLET ORAL EVERY MORNING
Qty: 90 TABLET | Refills: 1 | Status: SHIPPED | OUTPATIENT
Start: 2025-07-07

## 2025-07-08 DIAGNOSIS — G43.709 CHRONIC MIGRAINE WITHOUT AURA WITHOUT STATUS MIGRAINOSUS, NOT INTRACTABLE: ICD-10-CM

## 2025-07-10 RX ORDER — TOPIRAMATE 100 MG/1
100 TABLET, FILM COATED ORAL
Qty: 90 TABLET | Refills: 1 | Status: SHIPPED | OUTPATIENT
Start: 2025-07-10

## 2025-07-11 ENCOUNTER — APPOINTMENT (OUTPATIENT)
Dept: LAB | Facility: CLINIC | Age: 50
End: 2025-07-11
Payer: COMMERCIAL

## 2025-07-11 ENCOUNTER — OFFICE VISIT (OUTPATIENT)
Dept: HEMATOLOGY ONCOLOGY | Facility: CLINIC | Age: 50
End: 2025-07-11
Payer: COMMERCIAL

## 2025-07-11 VITALS
HEART RATE: 65 BPM | HEIGHT: 63 IN | TEMPERATURE: 97.3 F | SYSTOLIC BLOOD PRESSURE: 118 MMHG | BODY MASS INDEX: 29.06 KG/M2 | DIASTOLIC BLOOD PRESSURE: 74 MMHG | OXYGEN SATURATION: 99 % | RESPIRATION RATE: 16 BRPM | WEIGHT: 164 LBS

## 2025-07-11 DIAGNOSIS — E78.2 MIXED HYPERLIPIDEMIA: ICD-10-CM

## 2025-07-11 DIAGNOSIS — D50.0 IRON DEFICIENCY ANEMIA DUE TO CHRONIC BLOOD LOSS: ICD-10-CM

## 2025-07-11 DIAGNOSIS — R79.0 LOW FERRITIN: Primary | ICD-10-CM

## 2025-07-11 LAB
ALBUMIN SERPL BCG-MCNC: 4.5 G/DL (ref 3.5–5)
ALP SERPL-CCNC: 50 U/L (ref 34–104)
ALT SERPL W P-5'-P-CCNC: 11 U/L (ref 7–52)
ANION GAP SERPL CALCULATED.3IONS-SCNC: 5 MMOL/L (ref 4–13)
AST SERPL W P-5'-P-CCNC: 12 U/L (ref 13–39)
BASOPHILS # BLD AUTO: 0.06 THOUSANDS/ÂΜL (ref 0–0.1)
BASOPHILS NFR BLD AUTO: 1 % (ref 0–1)
BILIRUB SERPL-MCNC: 0.67 MG/DL (ref 0.2–1)
BUN SERPL-MCNC: 17 MG/DL (ref 5–25)
CALCIUM SERPL-MCNC: 9.2 MG/DL (ref 8.4–10.2)
CHLORIDE SERPL-SCNC: 109 MMOL/L (ref 96–108)
CO2 SERPL-SCNC: 25 MMOL/L (ref 21–32)
CREAT SERPL-MCNC: 1.06 MG/DL (ref 0.6–1.3)
EOSINOPHIL # BLD AUTO: 0.25 THOUSAND/ÂΜL (ref 0–0.61)
EOSINOPHIL NFR BLD AUTO: 5 % (ref 0–6)
ERYTHROCYTE [DISTWIDTH] IN BLOOD BY AUTOMATED COUNT: 12.9 % (ref 11.6–15.1)
FERRITIN SERPL-MCNC: 6 NG/ML (ref 30–307)
GFR SERPL CREATININE-BSD FRML MDRD: 61 ML/MIN/1.73SQ M
GLUCOSE SERPL-MCNC: 85 MG/DL (ref 65–140)
HCT VFR BLD AUTO: 39.3 % (ref 34.8–46.1)
HGB BLD-MCNC: 13.4 G/DL (ref 11.5–15.4)
IMM GRANULOCYTES # BLD AUTO: 0.01 THOUSAND/UL (ref 0–0.2)
IMM GRANULOCYTES NFR BLD AUTO: 0 % (ref 0–2)
IRON SATN MFR SERPL: 24 % (ref 15–50)
IRON SERPL-MCNC: 87 UG/DL (ref 50–212)
LYMPHOCYTES # BLD AUTO: 1.57 THOUSANDS/ÂΜL (ref 0.6–4.47)
LYMPHOCYTES NFR BLD AUTO: 32 % (ref 14–44)
MCH RBC QN AUTO: 32.2 PG (ref 26.8–34.3)
MCHC RBC AUTO-ENTMCNC: 34.1 G/DL (ref 31.4–37.4)
MCV RBC AUTO: 95 FL (ref 82–98)
MONOCYTES # BLD AUTO: 0.46 THOUSAND/ÂΜL (ref 0.17–1.22)
MONOCYTES NFR BLD AUTO: 10 % (ref 4–12)
NEUTROPHILS # BLD AUTO: 2.51 THOUSANDS/ÂΜL (ref 1.85–7.62)
NEUTS SEG NFR BLD AUTO: 52 % (ref 43–75)
NRBC BLD AUTO-RTO: 0 /100 WBCS
PLATELET # BLD AUTO: 283 THOUSANDS/UL (ref 149–390)
PMV BLD AUTO: 10.5 FL (ref 8.9–12.7)
POTASSIUM SERPL-SCNC: 4.3 MMOL/L (ref 3.5–5.3)
PROT SERPL-MCNC: 6.9 G/DL (ref 6.4–8.4)
RBC # BLD AUTO: 4.16 MILLION/UL (ref 3.81–5.12)
RETICS # AUTO: NORMAL 10*3/UL (ref 14097–95744)
RETICS # CALC: 1.12 % (ref 0.37–1.87)
SODIUM SERPL-SCNC: 139 MMOL/L (ref 135–147)
TIBC SERPL-MCNC: 357 UG/DL (ref 250–450)
TRANSFERRIN SERPL-MCNC: 255 MG/DL (ref 203–362)
UIBC SERPL-MCNC: 270 UG/DL (ref 155–355)
WBC # BLD AUTO: 4.86 THOUSAND/UL (ref 4.31–10.16)

## 2025-07-11 PROCEDURE — 83550 IRON BINDING TEST: CPT

## 2025-07-11 PROCEDURE — 85025 COMPLETE CBC W/AUTO DIFF WBC: CPT

## 2025-07-11 PROCEDURE — 99213 OFFICE O/P EST LOW 20 MIN: CPT

## 2025-07-11 PROCEDURE — 80053 COMPREHEN METABOLIC PANEL: CPT

## 2025-07-11 PROCEDURE — 83540 ASSAY OF IRON: CPT

## 2025-07-11 PROCEDURE — 82728 ASSAY OF FERRITIN: CPT

## 2025-07-11 PROCEDURE — 36415 COLL VENOUS BLD VENIPUNCTURE: CPT

## 2025-07-11 PROCEDURE — 85045 AUTOMATED RETICULOCYTE COUNT: CPT

## 2025-07-11 NOTE — ASSESSMENT & PLAN NOTE
Ms. Garcia is a 50-year-old female who presents in consult from her PCP for low ferritin levels.  Labs on February 2025 showed WBC 6.02, hemoglobin 13.4, hematocrit 40.7, MCV 93, platelets 331, ferritin 10, iron saturation 34.  On review of record she does not appear to have ever had anemia, though low ferritin/iron deficiency has been present since at least 2021.      CT abdomen pelvis in February 2024 showed uterine fibroids was negative for hepatosplenomegaly.  September 16, 2024 colonoscopy shows small hemorrhoids without visual bleeding, EGD the same day indicated a grade 2 sliding hiatal hernia, 10-year follow-up was recommended.    Patient has long history of fibroids and menorrhagia.  She states her period is regular but heavy.  It lasts 5 to 6 days and sometimes requires changing tampon hourly.  Ms. Garcia has discussed this with her OB but at this time there is no plans for managing her menorrhagia.  She will get labs today on 7/11/2025.  Given her labs in February 2025 that showed a ferritin of 10, I anticipate the need for IV Venofer as she has not tolerated oral iron in the past given her existing propensity for constipation.    Plan-  - CBC, CMP, reticulocyte, iron panel today  - Will follow-up with the plan for suspected low ferritin.  - Patient will follow-up in hematology clinic February 2026, will place labs for that time once today's labs have resulted.      Orders:    Iron Panel (Includes Ferritin, Iron Sat%, Iron, and TIBC)    Reticulocytes    Comprehensive metabolic panel    CBC and differential

## 2025-07-11 NOTE — PROGRESS NOTES
Name: Elvira Pearce      : 1975      MRN: [id number]  Encounter Provider: HERNAN Roach  Encounter Date: 2025   Encounter department: [facility]  :  Assessment & Plan  Low ferritin  Ms. Garcia is a 50-year-old female who presents in consult from her PCP for low ferritin levels.  Labs on 2025 showed WBC 6.02, hemoglobin 13.4, hematocrit 40.7, MCV 93, platelets 331, ferritin 10, iron saturation 34.  On review of record she does not appear to have ever had anemia, though low ferritin/iron deficiency has been present since at least .      CT abdomen pelvis in 2024 showed uterine fibroids was negative for hepatosplenomegaly.  2024 colonoscopy shows small hemorrhoids without visual bleeding, EGD the same day indicated a grade 2 sliding hiatal hernia, 10-year follow-up was recommended.    Patient has long history of fibroids and menorrhagia.  She states her period is regular but heavy.  It lasts 5 to 6 days and sometimes requires changing tampon hourly.  Ms. Garcia has discussed this with her OB but at this time there is no plans for managing her menorrhagia.  She will get labs today on 2025.  Given her labs in 2025 that showed a ferritin of 10, I anticipate the need for IV Venofer as she has not tolerated oral iron in the past given her existing propensity for constipation.    Plan-  - CBC, CMP, reticulocyte, iron panel today  - Will follow-up with the plan for suspected low ferritin.  - Patient will follow-up in hematology clinic 2026, will place labs for that time once today's labs have resulted.      Orders:    Iron Panel (Includes Ferritin, Iron Sat%, Iron, and TIBC)    Reticulocytes    Comprehensive metabolic panel    CBC and differential    Iron deficiency anemia due to chronic blood loss    Orders:    Iron Panel (Includes Ferritin, Iron Sat%, Iron, and TIBC)    Reticulocytes    Comprehensive  metabolic panel    CBC and differential        No follow-ups on file.    History of Present Illness   No chief complaint on file.    Pertinent Medical History     07/11/25: Ms. Garcia is a 50-year-old female who presents for consult from her PCP for history of iron deficiency without anemia.  She has history of migraines, esophageal reflux, depression, vitamin D deficiency, uterine fibroids.  She has a long history of menorrhagia.  Her periods are regular but last 5 to 6 days and oftentimes require changing tampon hourly.  Recent EGD colonoscopy were negative for blood loss.  She denies restrictive diet such as vegetarian or vegan.  She denies any GI surgeries.  She expresses chronic fatigue, restless leg syndrome, brittle nails, brittle hair.  She denies shortness of breath, palpitations, hematochezia, hematuria.    Ms. Pearce understands and agrees with the plan of care.  She will receive labs today on 7/11/2025 with anticipation of the need for IV iron supplementation given her history of constipation and intolerance to oral iron.     Review of Systems   Constitutional:  Positive for fatigue. Negative for activity change, appetite change, chills, diaphoresis, fever and unexpected weight change.   Respiratory:  Negative for apnea, cough and shortness of breath.    Cardiovascular: Negative.  Negative for chest pain, palpitations and leg swelling.   Gastrointestinal:  Positive for constipation. Negative for abdominal pain, anal bleeding, blood in stool and diarrhea.   Genitourinary:  Negative for hematuria.   Musculoskeletal: Negative.    Skin:  Negative for color change, pallor and rash.   Hematological:  Negative for adenopathy. Does not bruise/bleed easily.   Psychiatric/Behavioral: Negative.             Objective   There were no vitals taken for this visit.    Physical Exam  Vitals reviewed.   Constitutional:       General: She is not in acute distress.     Appearance: Normal appearance. She is normal weight.  She is not toxic-appearing.   HENT:      Mouth/Throat:      Mouth: Mucous membranes are moist.      Pharynx: Oropharynx is clear.     Eyes:      General: No scleral icterus.     Conjunctiva/sclera: Conjunctivae normal.      Pupils: Pupils are equal, round, and reactive to light.       Cardiovascular:      Rate and Rhythm: Normal rate and regular rhythm.      Pulses: Normal pulses.      Heart sounds: Normal heart sounds. No murmur heard.  Pulmonary:      Effort: Pulmonary effort is normal. No respiratory distress.      Breath sounds: No wheezing.     Musculoskeletal:      Cervical back: Normal range of motion.      Right lower leg: No edema.      Left lower leg: No edema.   Lymphadenopathy:      Cervical: No cervical adenopathy.     Skin:     General: Skin is warm.      Capillary Refill: Capillary refill takes less than 2 seconds.      Coloration: Skin is not jaundiced or pale.     Neurological:      General: No focal deficit present.      Mental Status: She is alert and oriented to person, place, and time.     Psychiatric:         Mood and Affect: Mood normal.         Labs: I have reviewed the following labs:  Results for orders placed or performed in visit on 02/20/25   CBC and differential   Result Value Ref Range    WBC 6.02 4.31 - 10.16 Thousand/uL    RBC 4.37 3.81 - 5.12 Million/uL    Hemoglobin 13.4 11.5 - 15.4 g/dL    Hematocrit 40.7 34.8 - 46.1 %    MCV 93 82 - 98 fL    MCH 30.7 26.8 - 34.3 pg    MCHC 32.9 31.4 - 37.4 g/dL    RDW 13.5 11.6 - 15.1 %    MPV 10.5 8.9 - 12.7 fL    Platelets 331 149 - 390 Thousands/uL    nRBC 0 /100 WBCs    Segmented % 55 43 - 75 %    Immature Grans % 0 0 - 2 %    Lymphocytes % 27 14 - 44 %    Monocytes % 11 4 - 12 %    Eosinophils Relative 6 0 - 6 %    Basophils Relative 1 0 - 1 %    Absolute Neutrophils 3.33 1.85 - 7.62 Thousands/µL    Absolute Immature Grans 0.01 0.00 - 0.20 Thousand/uL    Absolute Lymphocytes 1.62 0.60 - 4.47 Thousands/µL    Absolute Monocytes 0.63 0.17 -  1.22 Thousand/µL    Eosinophils Absolute 0.37 0.00 - 0.61 Thousand/µL    Basophils Absolute 0.06 0.00 - 0.10 Thousands/µL   Hepatic function panel   Result Value Ref Range    Total Bilirubin 0.83 0.20 - 1.00 mg/dL    Bilirubin, Direct 0.16 0.00 - 0.20 mg/dL    Alkaline Phosphatase 60 34 - 104 U/L    AST 10 (L) 13 - 39 U/L    ALT 8 7 - 52 U/L    Total Protein 7.4 6.4 - 8.4 g/dL    Albumin 4.7 3.5 - 5.0 g/dL   TIBC Panel (incl. Iron, TIBC, % Iron Saturation)   Result Value Ref Range    Iron Saturation 34 15 - 50 %    TIBC 338.8 250 - 450 ug/dL    Iron 114 50 - 212 ug/dL    Transferrin 242 203 - 362 mg/dL    UIBC 225 155 - 355 ug/dL   Result Value Ref Range    Ferritin 10 (L) 11 - 307 ng/mL     *Note: Due to a large number of results and/or encounters for the requested time period, some results have not been displayed. A complete set of results can be found in Results Review.           Administrative Statements   I have spent a total time of 30 minutes in caring for this patient on the day of the visit/encounter including Diagnostic results, Prognosis, Risks and benefits of tx options, Instructions for management, Patient and family education, Risk factor reductions, Impressions, Documenting in the medical record, Reviewing/placing orders in the medical record (including tests, medications, and/or procedures), and Obtaining or reviewing history  .

## 2025-07-14 ENCOUNTER — TELEPHONE (OUTPATIENT)
Dept: HEMATOLOGY ONCOLOGY | Facility: CLINIC | Age: 50
End: 2025-07-14

## 2025-07-14 DIAGNOSIS — E61.1 IRON DEFICIENCY: Primary | ICD-10-CM

## 2025-07-14 DIAGNOSIS — R79.0 LOW FERRITIN: ICD-10-CM

## 2025-07-14 DIAGNOSIS — D50.0 IRON DEFICIENCY ANEMIA DUE TO CHRONIC BLOOD LOSS: ICD-10-CM

## 2025-07-14 RX ORDER — SODIUM CHLORIDE 9 MG/ML
20 INJECTION, SOLUTION INTRAVENOUS ONCE
Status: CANCELLED | OUTPATIENT
Start: 2025-07-29

## 2025-07-14 NOTE — TELEPHONE ENCOUNTER
Patient returned call in order to schedule appointments. Scheduled to begin infusions 7/29 at [facility]. Reviewed infusion location and answered any questions.

## 2025-07-29 ENCOUNTER — TELEPHONE (OUTPATIENT)
Dept: INFUSION CENTER | Facility: CLINIC | Age: 50
End: 2025-07-29

## 2025-07-30 ENCOUNTER — HOSPITAL ENCOUNTER (OUTPATIENT)
Dept: INFUSION CENTER | Facility: CLINIC | Age: 50
Discharge: HOME | End: 2025-07-30
Attending: INTERNAL MEDICINE
Payer: COMMERCIAL

## 2025-07-30 VITALS
DIASTOLIC BLOOD PRESSURE: 74 MMHG | RESPIRATION RATE: 18 BRPM | SYSTOLIC BLOOD PRESSURE: 113 MMHG | OXYGEN SATURATION: 100 % | TEMPERATURE: 98.9 F | HEART RATE: 96 BPM

## 2025-07-30 DIAGNOSIS — E61.1 IRON DEFICIENCY: ICD-10-CM

## 2025-07-30 DIAGNOSIS — R79.0 LOW FERRITIN: Primary | ICD-10-CM

## 2025-07-30 PROCEDURE — 96365 THER/PROPH/DIAG IV INF INIT: CPT

## 2025-07-30 RX ORDER — SODIUM CHLORIDE 9 MG/ML
20 INJECTION, SOLUTION INTRAVENOUS ONCE
Status: CANCELLED | OUTPATIENT
Start: 2025-08-05

## 2025-07-30 RX ORDER — SODIUM CHLORIDE 9 MG/ML
20 INJECTION, SOLUTION INTRAVENOUS ONCE
Status: COMPLETED | OUTPATIENT
Start: 2025-07-30 | End: 2025-07-30

## 2025-07-30 RX ADMIN — SODIUM CHLORIDE 20 ML/HR: 0.9 INJECTION, SOLUTION INTRAVENOUS at 13:59

## 2025-07-30 RX ADMIN — IRON SUCROSE 200 MG: 20 INJECTION, SOLUTION INTRAVENOUS at 14:03

## 2025-08-05 ENCOUNTER — HOSPITAL ENCOUNTER (OUTPATIENT)
Dept: INFUSION CENTER | Facility: CLINIC | Age: 50
Discharge: HOME | End: 2025-08-05
Attending: INTERNAL MEDICINE

## 2025-08-11 ENCOUNTER — TELEPHONE (OUTPATIENT)
Dept: INFUSION CENTER | Facility: CLINIC | Age: 50
End: 2025-08-11

## 2025-08-18 ENCOUNTER — TELEPHONE (OUTPATIENT)
Dept: INFUSION CENTER | Facility: CLINIC | Age: 50
End: 2025-08-18

## 2025-08-20 ENCOUNTER — TELEPHONE (OUTPATIENT)
Dept: INFUSION CENTER | Facility: CLINIC | Age: 50
End: 2025-08-20

## 2025-08-21 ENCOUNTER — TELEPHONE (OUTPATIENT)
Dept: INFUSION CENTER | Facility: CLINIC | Age: 50
End: 2025-08-21